# Patient Record
Sex: FEMALE | Race: WHITE | NOT HISPANIC OR LATINO | Employment: FULL TIME | URBAN - METROPOLITAN AREA
[De-identification: names, ages, dates, MRNs, and addresses within clinical notes are randomized per-mention and may not be internally consistent; named-entity substitution may affect disease eponyms.]

---

## 2017-01-06 ENCOUNTER — PROCEDURE VISIT (OUTPATIENT)
Dept: OBSTETRICS AND GYNECOLOGY | Facility: CLINIC | Age: 47
End: 2017-01-06

## 2017-01-06 ENCOUNTER — OFFICE VISIT (OUTPATIENT)
Dept: OBSTETRICS AND GYNECOLOGY | Facility: CLINIC | Age: 47
End: 2017-01-06

## 2017-01-06 VITALS
DIASTOLIC BLOOD PRESSURE: 74 MMHG | SYSTOLIC BLOOD PRESSURE: 102 MMHG | HEIGHT: 63 IN | BODY MASS INDEX: 25.66 KG/M2 | WEIGHT: 144.8 LBS

## 2017-01-06 DIAGNOSIS — R14.0 ABDOMINAL BLOATING ASSOCIATED WITH MENSTRUATION: ICD-10-CM

## 2017-01-06 DIAGNOSIS — Z12.31 VISIT FOR SCREENING MAMMOGRAM: ICD-10-CM

## 2017-01-06 DIAGNOSIS — N94.89 ABDOMINAL BLOATING ASSOCIATED WITH MENSTRUATION: ICD-10-CM

## 2017-01-06 DIAGNOSIS — Z00.00 ANNUAL PHYSICAL EXAM: Primary | ICD-10-CM

## 2017-01-06 PROCEDURE — G0202 SCR MAMMO BI INCL CAD: HCPCS | Performed by: OBSTETRICS & GYNECOLOGY

## 2017-01-06 PROCEDURE — 99396 PREV VISIT EST AGE 40-64: CPT | Performed by: OBSTETRICS & GYNECOLOGY

## 2017-01-06 RX ORDER — ACYCLOVIR 400 MG/1
400 TABLET ORAL TAKE AS DIRECTED
Refills: 5 | COMMUNITY
Start: 2016-11-15 | End: 2020-08-03

## 2017-01-06 RX ORDER — CLINDAMYCIN PHOSPHATE AND BENZOYL PEROXIDE 10; 50 MG/G; MG/G
1 GEL TOPICAL AS NEEDED
Refills: 5 | COMMUNITY
Start: 2016-11-14

## 2017-01-06 RX ORDER — ESCITALOPRAM OXALATE 10 MG/1
10 TABLET ORAL DAILY
Qty: 30 TABLET | Refills: 11 | Status: SHIPPED | OUTPATIENT
Start: 2017-01-06 | End: 2021-07-20

## 2017-01-10 LAB
CONV .: NORMAL
CYTOLOGIST CVX/VAG CYTO: NORMAL
CYTOLOGY CVX/VAG DOC THIN PREP: NORMAL
DX ICD CODE: NORMAL
HIV 1 & 2 AB SER-IMP: NORMAL
Lab: NORMAL
OTHER STN SPEC: NORMAL
PATH REPORT.FINAL DX SPEC: NORMAL
STAT OF ADQ CVX/VAG CYTO-IMP: NORMAL

## 2017-03-08 ENCOUNTER — OFFICE VISIT (OUTPATIENT)
Dept: GASTROENTEROLOGY | Facility: CLINIC | Age: 47
End: 2017-03-08

## 2017-03-08 VITALS
BODY MASS INDEX: 25.55 KG/M2 | SYSTOLIC BLOOD PRESSURE: 118 MMHG | WEIGHT: 144.2 LBS | HEIGHT: 63 IN | DIASTOLIC BLOOD PRESSURE: 72 MMHG

## 2017-03-08 DIAGNOSIS — K59.00 CONSTIPATION, UNSPECIFIED CONSTIPATION TYPE: Primary | ICD-10-CM

## 2017-03-08 PROCEDURE — 99204 OFFICE O/P NEW MOD 45 MIN: CPT | Performed by: INTERNAL MEDICINE

## 2017-03-08 RX ORDER — SODIUM CHLORIDE 0.9 % (FLUSH) 0.9 %
1-10 SYRINGE (ML) INJECTION AS NEEDED
Status: CANCELLED | OUTPATIENT
Start: 2017-03-08

## 2017-03-09 LAB
ALBUMIN SERPL-MCNC: 4.5 G/DL (ref 3.5–5.2)
ALBUMIN/GLOB SERPL: 1.6 G/DL
ALP SERPL-CCNC: 64 U/L (ref 39–117)
ALT SERPL-CCNC: 10 U/L (ref 1–33)
AST SERPL-CCNC: 18 U/L (ref 1–32)
BASOPHILS # BLD AUTO: 0.03 10*3/MM3 (ref 0–0.2)
BASOPHILS NFR BLD AUTO: 0.4 % (ref 0–1.5)
BILIRUB SERPL-MCNC: 0.3 MG/DL (ref 0.1–1.2)
BUN SERPL-MCNC: 18 MG/DL (ref 6–20)
BUN/CREAT SERPL: 18 (ref 7–25)
CALCIUM SERPL-MCNC: 9.9 MG/DL (ref 8.6–10.5)
CHLORIDE SERPL-SCNC: 101 MMOL/L (ref 98–107)
CO2 SERPL-SCNC: 24.4 MMOL/L (ref 22–29)
CREAT SERPL-MCNC: 1 MG/DL (ref 0.57–1)
EOSINOPHIL # BLD AUTO: 0.11 10*3/MM3 (ref 0–0.7)
EOSINOPHIL NFR BLD AUTO: 1.3 % (ref 0.3–6.2)
ERYTHROCYTE [DISTWIDTH] IN BLOOD BY AUTOMATED COUNT: 12.3 % (ref 11.7–13)
GLIADIN PEPTIDE IGA SER-ACNC: 5 UNITS (ref 0–19)
GLIADIN PEPTIDE IGG SER-ACNC: 3 UNITS (ref 0–19)
GLOBULIN SER CALC-MCNC: 2.9 GM/DL
GLUCOSE SERPL-MCNC: 98 MG/DL (ref 65–99)
HCT VFR BLD AUTO: 41.2 % (ref 35.6–45.5)
HGB BLD-MCNC: 13.6 G/DL (ref 11.9–15.5)
IGA SERPL-MCNC: 269 MG/DL (ref 87–352)
IMM GRANULOCYTES # BLD: 0.02 10*3/MM3 (ref 0–0.03)
IMM GRANULOCYTES NFR BLD: 0.2 % (ref 0–0.5)
LYMPHOCYTES # BLD AUTO: 2.81 10*3/MM3 (ref 0.9–4.8)
LYMPHOCYTES NFR BLD AUTO: 32.9 % (ref 19.6–45.3)
MCH RBC QN AUTO: 30.3 PG (ref 26.9–32)
MCHC RBC AUTO-ENTMCNC: 33 G/DL (ref 32.4–36.3)
MCV RBC AUTO: 91.8 FL (ref 80.5–98.2)
MONOCYTES # BLD AUTO: 0.55 10*3/MM3 (ref 0.2–1.2)
MONOCYTES NFR BLD AUTO: 6.4 % (ref 5–12)
NEUTROPHILS # BLD AUTO: 5.01 10*3/MM3 (ref 1.9–8.1)
NEUTROPHILS NFR BLD AUTO: 58.8 % (ref 42.7–76)
PLATELET # BLD AUTO: 389 10*3/MM3 (ref 140–500)
POTASSIUM SERPL-SCNC: 4.7 MMOL/L (ref 3.5–5.2)
PROT SERPL-MCNC: 7.4 G/DL (ref 6–8.5)
RBC # BLD AUTO: 4.49 10*6/MM3 (ref 3.9–5.2)
SODIUM SERPL-SCNC: 141 MMOL/L (ref 136–145)
TSH SERPL DL<=0.005 MIU/L-ACNC: 1.63 MIU/ML (ref 0.27–4.2)
TTG IGA SER-ACNC: <2 U/ML (ref 0–3)
TTG IGG SER-ACNC: <2 U/ML (ref 0–5)
WBC # BLD AUTO: 8.53 10*3/MM3 (ref 4.5–10.7)

## 2017-03-15 ENCOUNTER — TELEPHONE (OUTPATIENT)
Dept: GASTROENTEROLOGY | Facility: CLINIC | Age: 47
End: 2017-03-15

## 2017-05-17 ENCOUNTER — IMPORTED ENCOUNTER (OUTPATIENT)
Dept: URBAN - METROPOLITAN AREA CLINIC 9 | Facility: CLINIC | Age: 47
End: 2017-05-17

## 2018-01-17 ENCOUNTER — OFFICE VISIT (OUTPATIENT)
Dept: OBSTETRICS AND GYNECOLOGY | Facility: CLINIC | Age: 48
End: 2018-01-17

## 2018-01-17 VITALS
DIASTOLIC BLOOD PRESSURE: 90 MMHG | WEIGHT: 156.4 LBS | BODY MASS INDEX: 27.71 KG/M2 | HEIGHT: 63 IN | SYSTOLIC BLOOD PRESSURE: 130 MMHG

## 2018-01-17 DIAGNOSIS — R14.0 ABDOMINAL BLOATING: Primary | ICD-10-CM

## 2018-01-17 DIAGNOSIS — Z01.419 ENCOUNTER FOR WELL WOMAN EXAM WITH ROUTINE GYNECOLOGICAL EXAM: ICD-10-CM

## 2018-01-17 PROCEDURE — 99396 PREV VISIT EST AGE 40-64: CPT | Performed by: OBSTETRICS & GYNECOLOGY

## 2018-01-17 RX ORDER — ESCITALOPRAM OXALATE 10 MG/1
10 TABLET ORAL DAILY
Qty: 30 TABLET | Refills: 11 | Status: SHIPPED | OUTPATIENT
Start: 2018-01-17 | End: 2018-02-05 | Stop reason: SDUPTHER

## 2018-01-17 RX ORDER — ESCITALOPRAM OXALATE 10 MG/1
10 TABLET ORAL DAILY
COMMUNITY
End: 2018-01-17 | Stop reason: SDUPTHER

## 2018-01-18 LAB
CONV .: NORMAL
CYTOLOGIST CVX/VAG CYTO: NORMAL
CYTOLOGY CVX/VAG DOC THIN PREP: NORMAL
DX ICD CODE: NORMAL
HIV 1 & 2 AB SER-IMP: NORMAL
OTHER STN SPEC: NORMAL
PATH REPORT.FINAL DX SPEC: NORMAL
STAT OF ADQ CVX/VAG CYTO-IMP: NORMAL

## 2018-02-07 RX ORDER — NORETHINDRONE ACETATE AND ETHINYL ESTRADIOL, ETHINYL ESTRADIOL AND FERROUS FUMARATE 1MG-10(24)
KIT ORAL
Qty: 28 TABLET | Refills: 0 | Status: SHIPPED | OUTPATIENT
Start: 2018-02-07 | End: 2019-06-07

## 2018-02-07 RX ORDER — ESCITALOPRAM OXALATE 10 MG/1
TABLET ORAL
Qty: 30 TABLET | Refills: 0 | Status: SHIPPED | OUTPATIENT
Start: 2018-02-07 | End: 2018-03-28 | Stop reason: SDUPTHER

## 2018-02-08 ENCOUNTER — OFFICE (OUTPATIENT)
Dept: URBAN - METROPOLITAN AREA CLINIC 66 | Facility: CLINIC | Age: 48
End: 2018-02-08

## 2018-02-08 VITALS
DIASTOLIC BLOOD PRESSURE: 87 MMHG | HEART RATE: 86 BPM | HEIGHT: 63 IN | WEIGHT: 156 LBS | TEMPERATURE: 99.3 F | SYSTOLIC BLOOD PRESSURE: 133 MMHG

## 2018-02-08 DIAGNOSIS — R14.0 ABDOMINAL DISTENSION (GASEOUS): ICD-10-CM

## 2018-02-08 DIAGNOSIS — K59.00 CONSTIPATION, UNSPECIFIED: ICD-10-CM

## 2018-02-08 PROCEDURE — 99242 OFF/OP CONSLTJ NEW/EST SF 20: CPT | Performed by: INTERNAL MEDICINE

## 2018-02-08 RX ORDER — LUBIPROSTONE 8 UG/1
8 CAPSULE, GELATIN COATED ORAL
Qty: 30 | Refills: 11 | Status: ACTIVE
Start: 2018-02-08

## 2018-03-28 RX ORDER — ESCITALOPRAM OXALATE 10 MG/1
TABLET ORAL
Qty: 30 TABLET | Refills: 0 | Status: SHIPPED | OUTPATIENT
Start: 2018-03-28 | End: 2019-06-07 | Stop reason: SDUPTHER

## 2018-04-19 ENCOUNTER — OFFICE (OUTPATIENT)
Dept: URBAN - METROPOLITAN AREA CLINIC 66 | Facility: CLINIC | Age: 48
End: 2018-04-19

## 2018-04-19 VITALS
HEIGHT: 63 IN | SYSTOLIC BLOOD PRESSURE: 120 MMHG | HEART RATE: 74 BPM | DIASTOLIC BLOOD PRESSURE: 82 MMHG | WEIGHT: 159 LBS

## 2018-04-19 DIAGNOSIS — K59.01 SLOW TRANSIT CONSTIPATION: ICD-10-CM

## 2018-04-19 DIAGNOSIS — K59.00 CONSTIPATION, UNSPECIFIED: ICD-10-CM

## 2018-04-19 PROCEDURE — 99212 OFFICE O/P EST SF 10 MIN: CPT | Performed by: INTERNAL MEDICINE

## 2018-05-16 ENCOUNTER — OFFICE VISIT (OUTPATIENT)
Dept: OBSTETRICS AND GYNECOLOGY | Facility: CLINIC | Age: 48
End: 2018-05-16

## 2018-05-16 VITALS — BODY MASS INDEX: 27.99 KG/M2 | DIASTOLIC BLOOD PRESSURE: 84 MMHG | SYSTOLIC BLOOD PRESSURE: 120 MMHG | WEIGHT: 158 LBS

## 2018-05-16 DIAGNOSIS — N63.0 BREAST LUMP IN FEMALE: Primary | ICD-10-CM

## 2018-05-16 PROCEDURE — 99213 OFFICE O/P EST LOW 20 MIN: CPT | Performed by: OBSTETRICS & GYNECOLOGY

## 2018-05-17 ENCOUNTER — HOSPITAL ENCOUNTER (OUTPATIENT)
Dept: ULTRASOUND IMAGING | Facility: HOSPITAL | Age: 48
Discharge: HOME | End: 2018-05-17
Attending: OBSTETRICS & GYNECOLOGY | Admitting: OBSTETRICS & GYNECOLOGY

## 2018-05-17 DIAGNOSIS — N63.0 BREAST LUMP IN FEMALE: ICD-10-CM

## 2018-05-17 PROCEDURE — 76642 ULTRASOUND BREAST LIMITED: CPT

## 2018-05-21 ENCOUNTER — DOCUMENTATION (OUTPATIENT)
Dept: OBSTETRICS AND GYNECOLOGY | Facility: CLINIC | Age: 48
End: 2018-05-21

## 2018-07-12 ENCOUNTER — IMPORTED ENCOUNTER (OUTPATIENT)
Dept: URBAN - METROPOLITAN AREA CLINIC 9 | Facility: CLINIC | Age: 48
End: 2018-07-12

## 2018-10-25 ENCOUNTER — OFFICE (OUTPATIENT)
Dept: URBAN - METROPOLITAN AREA CLINIC 66 | Facility: CLINIC | Age: 48
End: 2018-10-25

## 2018-10-25 VITALS
WEIGHT: 162 LBS | DIASTOLIC BLOOD PRESSURE: 70 MMHG | SYSTOLIC BLOOD PRESSURE: 124 MMHG | HEIGHT: 63 IN | HEART RATE: 88 BPM

## 2018-10-25 DIAGNOSIS — K59.01 SLOW TRANSIT CONSTIPATION: ICD-10-CM

## 2018-10-25 DIAGNOSIS — R14.0 ABDOMINAL DISTENSION (GASEOUS): ICD-10-CM

## 2018-10-25 PROCEDURE — 99212 OFFICE O/P EST SF 10 MIN: CPT | Performed by: INTERNAL MEDICINE

## 2018-12-04 ENCOUNTER — OFFICE VISIT (OUTPATIENT)
Dept: OBSTETRICS AND GYNECOLOGY | Facility: CLINIC | Age: 48
End: 2018-12-04

## 2018-12-04 VITALS
SYSTOLIC BLOOD PRESSURE: 118 MMHG | BODY MASS INDEX: 28.63 KG/M2 | WEIGHT: 161.6 LBS | HEIGHT: 63 IN | DIASTOLIC BLOOD PRESSURE: 68 MMHG

## 2018-12-04 DIAGNOSIS — R59.0 LYMPHADENOPATHY, AXILLARY: Primary | ICD-10-CM

## 2018-12-04 PROCEDURE — 99214 OFFICE O/P EST MOD 30 MIN: CPT | Performed by: OBSTETRICS & GYNECOLOGY

## 2018-12-04 RX ORDER — POLYETHYLENE GLYCOL 3350 17 G/17G
17 POWDER, FOR SOLUTION ORAL DAILY
COMMUNITY

## 2018-12-05 ENCOUNTER — TELEPHONE (OUTPATIENT)
Dept: OBSTETRICS AND GYNECOLOGY | Facility: CLINIC | Age: 48
End: 2018-12-05

## 2018-12-05 ENCOUNTER — TELEPHONE (OUTPATIENT)
Dept: SURGERY | Facility: CLINIC | Age: 48
End: 2018-12-05

## 2018-12-05 DIAGNOSIS — R59.0 AXILLARY ADENOPATHY: Primary | ICD-10-CM

## 2018-12-11 ENCOUNTER — TELEPHONE (OUTPATIENT)
Dept: SURGERY | Facility: CLINIC | Age: 48
End: 2018-12-11

## 2018-12-12 ENCOUNTER — HOSPITAL ENCOUNTER (OUTPATIENT)
Dept: ULTRASOUND IMAGING | Facility: HOSPITAL | Age: 48
Discharge: HOME | End: 2018-12-12
Attending: SURGERY | Admitting: SURGERY

## 2018-12-12 ENCOUNTER — HOSPITAL ENCOUNTER (OUTPATIENT)
Dept: MAMMOGRAPHY | Facility: HOSPITAL | Age: 48
Discharge: HOME | End: 2018-12-12
Attending: SURGERY

## 2018-12-12 DIAGNOSIS — R59.0 AXILLARY LYMPHADENOPATHY: ICD-10-CM

## 2018-12-12 PROCEDURE — 76882 US LMTD JT/FCL EVL NVASC XTR: CPT

## 2018-12-12 PROCEDURE — G0279 TOMOSYNTHESIS, MAMMO: HCPCS

## 2018-12-12 PROCEDURE — 77066 DX MAMMO INCL CAD BI: CPT

## 2018-12-13 ENCOUNTER — OFFICE VISIT (OUTPATIENT)
Dept: SURGERY | Facility: CLINIC | Age: 48
End: 2018-12-13

## 2018-12-13 VITALS
HEART RATE: 93 BPM | BODY MASS INDEX: 28.46 KG/M2 | DIASTOLIC BLOOD PRESSURE: 83 MMHG | WEIGHT: 160.6 LBS | HEIGHT: 63 IN | OXYGEN SATURATION: 99 % | SYSTOLIC BLOOD PRESSURE: 135 MMHG

## 2018-12-13 DIAGNOSIS — N64.4 BREAST PAIN, LEFT: ICD-10-CM

## 2018-12-13 DIAGNOSIS — Q83.1 ACCESSORY BREAST TISSUE OF AXILLA: Primary | ICD-10-CM

## 2018-12-13 PROCEDURE — 99204 OFFICE O/P NEW MOD 45 MIN: CPT | Performed by: SURGERY

## 2019-01-16 ENCOUNTER — OFFICE (OUTPATIENT)
Dept: URBAN - METROPOLITAN AREA CLINIC 66 | Facility: CLINIC | Age: 49
End: 2019-01-16

## 2019-01-16 VITALS
WEIGHT: 164 LBS | HEIGHT: 63 IN | SYSTOLIC BLOOD PRESSURE: 110 MMHG | DIASTOLIC BLOOD PRESSURE: 72 MMHG | HEART RATE: 92 BPM

## 2019-01-16 DIAGNOSIS — K59.01 SLOW TRANSIT CONSTIPATION: ICD-10-CM

## 2019-01-16 DIAGNOSIS — K59.00 CONSTIPATION, UNSPECIFIED: ICD-10-CM

## 2019-01-16 PROCEDURE — 99213 OFFICE O/P EST LOW 20 MIN: CPT | Performed by: INTERNAL MEDICINE

## 2019-01-16 RX ORDER — NORETHINDRONE ACETATE AND ETHINYL ESTRADIOL, ETHINYL ESTRADIOL AND FERROUS FUMARATE 1MG-10(24)
KIT ORAL
Qty: 28 TABLET | Refills: 11 | Status: SHIPPED | OUTPATIENT
Start: 2019-01-16 | End: 2019-06-07

## 2019-04-15 ENCOUNTER — IMPORTED ENCOUNTER (OUTPATIENT)
Dept: URBAN - METROPOLITAN AREA CLINIC 9 | Facility: CLINIC | Age: 49
End: 2019-04-15

## 2019-06-07 ENCOUNTER — OFFICE VISIT (OUTPATIENT)
Dept: OBSTETRICS AND GYNECOLOGY | Facility: CLINIC | Age: 49
End: 2019-06-07

## 2019-06-07 VITALS
HEIGHT: 63 IN | WEIGHT: 163 LBS | DIASTOLIC BLOOD PRESSURE: 78 MMHG | BODY MASS INDEX: 28.88 KG/M2 | SYSTOLIC BLOOD PRESSURE: 120 MMHG

## 2019-06-07 DIAGNOSIS — F32.A DEPRESSION, UNSPECIFIED DEPRESSION TYPE: ICD-10-CM

## 2019-06-07 DIAGNOSIS — N92.0 MENORRHAGIA WITH REGULAR CYCLE: ICD-10-CM

## 2019-06-07 DIAGNOSIS — Z00.00 ANNUAL PHYSICAL EXAM: Primary | ICD-10-CM

## 2019-06-07 PROCEDURE — 99213 OFFICE O/P EST LOW 20 MIN: CPT | Performed by: OBSTETRICS & GYNECOLOGY

## 2019-06-07 PROCEDURE — 99396 PREV VISIT EST AGE 40-64: CPT | Performed by: OBSTETRICS & GYNECOLOGY

## 2019-06-07 RX ORDER — ESCITALOPRAM OXALATE 10 MG/1
10 TABLET ORAL DAILY
Qty: 90 TABLET | Refills: 3 | Status: SHIPPED | OUTPATIENT
Start: 2019-06-07 | End: 2020-06-29

## 2019-06-11 LAB
CONV .: NORMAL
CYTOLOGIST CVX/VAG CYTO: NORMAL
CYTOLOGY CVX/VAG DOC CYTO: NORMAL
CYTOLOGY CVX/VAG DOC THIN PREP: NORMAL
DX ICD CODE: NORMAL
HIV 1 & 2 AB SER-IMP: NORMAL
OTHER STN SPEC: NORMAL
STAT OF ADQ CVX/VAG CYTO-IMP: NORMAL

## 2019-10-23 ENCOUNTER — TRANSCRIBE ORDERS (OUTPATIENT)
Dept: ADMINISTRATIVE | Facility: HOSPITAL | Age: 49
End: 2019-10-23

## 2019-10-23 DIAGNOSIS — Z12.31 SCREENING MAMMOGRAM, ENCOUNTER FOR: Primary | ICD-10-CM

## 2019-10-25 ENCOUNTER — RESULTS ENCOUNTER (OUTPATIENT)
Dept: INTERNAL MEDICINE | Age: 49
End: 2019-10-25

## 2019-10-25 DIAGNOSIS — Z00.00 PREVENTATIVE HEALTH CARE: Primary | ICD-10-CM

## 2019-10-25 DIAGNOSIS — Z00.00 PREVENTATIVE HEALTH CARE: ICD-10-CM

## 2019-10-28 LAB
ALBUMIN SERPL-MCNC: 4.4 G/DL (ref 3.5–5.2)
ALBUMIN/GLOB SERPL: 1.8 G/DL
ALP SERPL-CCNC: 77 U/L (ref 39–117)
ALT SERPL-CCNC: 19 U/L (ref 1–33)
APPEARANCE UR: CLEAR
AST SERPL-CCNC: 17 U/L (ref 1–32)
BACTERIA #/AREA URNS HPF: ABNORMAL /HPF
BASOPHILS # BLD AUTO: 0.03 10*3/MM3 (ref 0–0.2)
BASOPHILS NFR BLD AUTO: 0.4 % (ref 0–1.5)
BILIRUB SERPL-MCNC: 0.3 MG/DL (ref 0.2–1.2)
BILIRUB UR QL STRIP: NEGATIVE
BUN SERPL-MCNC: 15 MG/DL (ref 6–20)
BUN/CREAT SERPL: 14.4 (ref 7–25)
CALCIUM SERPL-MCNC: 10 MG/DL (ref 8.6–10.5)
CASTS URNS MICRO: ABNORMAL
CHLORIDE SERPL-SCNC: 101 MMOL/L (ref 98–107)
CHOLEST SERPL-MCNC: 206 MG/DL (ref 0–200)
CHOLEST/HDLC SERPL: 2.9 {RATIO}
CO2 SERPL-SCNC: 26.6 MMOL/L (ref 22–29)
COLOR UR: YELLOW
CREAT SERPL-MCNC: 1.04 MG/DL (ref 0.57–1)
EOSINOPHIL # BLD AUTO: 0.12 10*3/MM3 (ref 0–0.4)
EOSINOPHIL NFR BLD AUTO: 1.5 % (ref 0.3–6.2)
EPI CELLS #/AREA URNS HPF: ABNORMAL /HPF
ERYTHROCYTE [DISTWIDTH] IN BLOOD BY AUTOMATED COUNT: 11.9 % (ref 12.3–15.4)
GLOBULIN SER CALC-MCNC: 2.5 GM/DL
GLUCOSE SERPL-MCNC: 84 MG/DL (ref 65–99)
GLUCOSE UR QL: NEGATIVE
HBA1C MFR BLD: 5.4 % (ref 4.8–5.6)
HCT VFR BLD AUTO: 39.1 % (ref 34–46.6)
HDLC SERPL-MCNC: 71 MG/DL (ref 40–60)
HGB BLD-MCNC: 13 G/DL (ref 12–15.9)
HGB UR QL STRIP: ABNORMAL
IMM GRANULOCYTES # BLD AUTO: 0.03 10*3/MM3 (ref 0–0.05)
IMM GRANULOCYTES NFR BLD AUTO: 0.4 % (ref 0–0.5)
KETONES UR QL STRIP: NEGATIVE
LDLC SERPL CALC-MCNC: 107 MG/DL (ref 0–100)
LEUKOCYTE ESTERASE UR QL STRIP: ABNORMAL
LYMPHOCYTES # BLD AUTO: 2.21 10*3/MM3 (ref 0.7–3.1)
LYMPHOCYTES NFR BLD AUTO: 27.7 % (ref 19.6–45.3)
MCH RBC QN AUTO: 30.3 PG (ref 26.6–33)
MCHC RBC AUTO-ENTMCNC: 33.2 G/DL (ref 31.5–35.7)
MCV RBC AUTO: 91.1 FL (ref 79–97)
MONOCYTES # BLD AUTO: 0.68 10*3/MM3 (ref 0.1–0.9)
MONOCYTES NFR BLD AUTO: 8.5 % (ref 5–12)
NEUTROPHILS # BLD AUTO: 4.92 10*3/MM3 (ref 1.7–7)
NEUTROPHILS NFR BLD AUTO: 61.5 % (ref 42.7–76)
NITRITE UR QL STRIP: NEGATIVE
NRBC BLD AUTO-RTO: 0 /100 WBC (ref 0–0.2)
PH UR STRIP: 5.5 [PH] (ref 5–8)
PLATELET # BLD AUTO: 445 10*3/MM3 (ref 140–450)
POTASSIUM SERPL-SCNC: 5.2 MMOL/L (ref 3.5–5.2)
PROT SERPL-MCNC: 6.9 G/DL (ref 6–8.5)
PROT UR QL STRIP: NEGATIVE
RBC # BLD AUTO: 4.29 10*6/MM3 (ref 3.77–5.28)
RBC #/AREA URNS HPF: ABNORMAL /HPF
SODIUM SERPL-SCNC: 140 MMOL/L (ref 136–145)
SP GR UR: 1.02 (ref 1–1.03)
T4 FREE SERPL-MCNC: 1.18 NG/DL (ref 0.93–1.7)
TRIGL SERPL-MCNC: 141 MG/DL (ref 0–150)
TSH SERPL DL<=0.005 MIU/L-ACNC: 2.22 UIU/ML (ref 0.27–4.2)
UROBILINOGEN UR STRIP-MCNC: ABNORMAL MG/DL
VLDLC SERPL CALC-MCNC: 28.2 MG/DL
WBC # BLD AUTO: 7.99 10*3/MM3 (ref 3.4–10.8)
WBC #/AREA URNS HPF: ABNORMAL /HPF

## 2019-11-04 ENCOUNTER — OFFICE VISIT (OUTPATIENT)
Dept: INTERNAL MEDICINE | Age: 49
End: 2019-11-04

## 2019-11-04 VITALS
WEIGHT: 166 LBS | HEART RATE: 98 BPM | BODY MASS INDEX: 29.41 KG/M2 | HEIGHT: 63 IN | DIASTOLIC BLOOD PRESSURE: 80 MMHG | SYSTOLIC BLOOD PRESSURE: 118 MMHG | OXYGEN SATURATION: 100 % | TEMPERATURE: 97.9 F

## 2019-11-04 DIAGNOSIS — Z00.00 ROUTINE ADULT HEALTH MAINTENANCE: Primary | ICD-10-CM

## 2019-11-04 PROCEDURE — 99396 PREV VISIT EST AGE 40-64: CPT | Performed by: NURSE PRACTITIONER

## 2019-12-13 ENCOUNTER — HOSPITAL ENCOUNTER (OUTPATIENT)
Dept: MAMMOGRAPHY | Facility: HOSPITAL | Age: 49
Discharge: HOME | End: 2019-12-13
Admitting: OBSTETRICS & GYNECOLOGY

## 2019-12-13 DIAGNOSIS — Z12.31 SCREENING MAMMOGRAM, ENCOUNTER FOR: ICD-10-CM

## 2019-12-13 PROCEDURE — 77067 SCR MAMMO BI INCL CAD: CPT

## 2019-12-13 PROCEDURE — 77063 BREAST TOMOSYNTHESIS BI: CPT

## 2019-12-20 ENCOUNTER — ANESTHESIA EVENT (OUTPATIENT)
Dept: GASTROENTEROLOGY | Facility: HOSPITAL | Age: 49
End: 2019-12-20

## 2019-12-20 ENCOUNTER — ON CAMPUS - OUTPATIENT (OUTPATIENT)
Dept: URBAN - METROPOLITAN AREA HOSPITAL 114 | Facility: HOSPITAL | Age: 49
End: 2019-12-20
Payer: COMMERCIAL

## 2019-12-20 ENCOUNTER — ANESTHESIA (OUTPATIENT)
Dept: GASTROENTEROLOGY | Facility: HOSPITAL | Age: 49
End: 2019-12-20

## 2019-12-20 ENCOUNTER — HOSPITAL ENCOUNTER (OUTPATIENT)
Facility: HOSPITAL | Age: 49
Setting detail: HOSPITAL OUTPATIENT SURGERY
Discharge: HOME | End: 2019-12-20
Attending: INTERNAL MEDICINE | Admitting: INTERNAL MEDICINE

## 2019-12-20 VITALS
RESPIRATION RATE: 15 BRPM | SYSTOLIC BLOOD PRESSURE: 97 MMHG | BODY MASS INDEX: 28.7 KG/M2 | OXYGEN SATURATION: 100 % | WEIGHT: 162 LBS | TEMPERATURE: 97.8 F | HEART RATE: 88 BPM | DIASTOLIC BLOOD PRESSURE: 58 MMHG | HEIGHT: 63 IN

## 2019-12-20 DIAGNOSIS — Z12.11 ENCOUNTER FOR SCREENING FOR MALIGNANT NEOPLASM OF COLON: ICD-10-CM

## 2019-12-20 DIAGNOSIS — K64.1 SECOND DEGREE HEMORRHOIDS: ICD-10-CM

## 2019-12-20 PROCEDURE — 25010000002 PROPOFOL 10 MG/ML EMULSION: Performed by: ANESTHESIOLOGY

## 2019-12-20 PROCEDURE — 45378 DIAGNOSTIC COLONOSCOPY: CPT | Mod: 33 | Performed by: INTERNAL MEDICINE

## 2019-12-20 RX ORDER — LIDOCAINE HYDROCHLORIDE 20 MG/ML
INJECTION, SOLUTION INFILTRATION; PERINEURAL AS NEEDED
Status: DISCONTINUED | OUTPATIENT
Start: 2019-12-20 | End: 2019-12-20 | Stop reason: SURG

## 2019-12-20 RX ORDER — PROPOFOL 10 MG/ML
VIAL (ML) INTRAVENOUS AS NEEDED
Status: DISCONTINUED | OUTPATIENT
Start: 2019-12-20 | End: 2019-12-20 | Stop reason: SURG

## 2019-12-20 RX ORDER — SODIUM CHLORIDE, SODIUM LACTATE, POTASSIUM CHLORIDE, CALCIUM CHLORIDE 600; 310; 30; 20 MG/100ML; MG/100ML; MG/100ML; MG/100ML
30 INJECTION, SOLUTION INTRAVENOUS CONTINUOUS PRN
Status: DISCONTINUED | OUTPATIENT
Start: 2019-12-20 | End: 2019-12-20 | Stop reason: HOSPADM

## 2019-12-20 RX ORDER — PROPOFOL 10 MG/ML
VIAL (ML) INTRAVENOUS CONTINUOUS PRN
Status: DISCONTINUED | OUTPATIENT
Start: 2019-12-20 | End: 2019-12-20 | Stop reason: SURG

## 2019-12-20 RX ORDER — SODIUM CHLORIDE 0.9 % (FLUSH) 0.9 %
10 SYRINGE (ML) INJECTION AS NEEDED
Status: DISCONTINUED | OUTPATIENT
Start: 2019-12-20 | End: 2019-12-20 | Stop reason: HOSPADM

## 2019-12-20 RX ORDER — SODIUM CHLORIDE 0.9 % (FLUSH) 0.9 %
3 SYRINGE (ML) INJECTION EVERY 12 HOURS SCHEDULED
Status: DISCONTINUED | OUTPATIENT
Start: 2019-12-20 | End: 2019-12-20 | Stop reason: HOSPADM

## 2019-12-20 RX ADMIN — PROPOFOL 100 MG: 10 INJECTION, EMULSION INTRAVENOUS at 08:00

## 2019-12-20 RX ADMIN — PROPOFOL 100 MCG/KG/MIN: 10 INJECTION, EMULSION INTRAVENOUS at 08:00

## 2019-12-20 RX ADMIN — SODIUM CHLORIDE, POTASSIUM CHLORIDE, SODIUM LACTATE AND CALCIUM CHLORIDE 30 ML/HR: 600; 310; 30; 20 INJECTION, SOLUTION INTRAVENOUS at 07:33

## 2019-12-20 RX ADMIN — LIDOCAINE HYDROCHLORIDE 60 MG: 20 INJECTION, SOLUTION INFILTRATION; PERINEURAL at 08:00

## 2020-01-08 ENCOUNTER — OFFICE VISIT (OUTPATIENT)
Dept: INTERNAL MEDICINE | Age: 50
End: 2020-01-08

## 2020-01-08 VITALS
HEIGHT: 63 IN | HEART RATE: 101 BPM | OXYGEN SATURATION: 99 % | BODY MASS INDEX: 29.41 KG/M2 | DIASTOLIC BLOOD PRESSURE: 70 MMHG | TEMPERATURE: 97.8 F | SYSTOLIC BLOOD PRESSURE: 120 MMHG | WEIGHT: 166 LBS

## 2020-01-08 DIAGNOSIS — J45.20 MILD INTERMITTENT ASTHMA, UNSPECIFIED WHETHER COMPLICATED: ICD-10-CM

## 2020-01-08 DIAGNOSIS — J06.9 VIRAL URI: Primary | ICD-10-CM

## 2020-01-08 PROCEDURE — 99214 OFFICE O/P EST MOD 30 MIN: CPT | Performed by: INTERNAL MEDICINE

## 2020-01-08 RX ORDER — AZITHROMYCIN 250 MG/1
TABLET, FILM COATED ORAL
Qty: 6 TABLET | Refills: 0 | Status: SHIPPED | OUTPATIENT
Start: 2020-01-08 | End: 2020-01-13

## 2020-01-08 RX ORDER — GUAIFENESIN AND DEXTROMETHORPHAN HYDROBROMIDE 1200; 60 MG/1; MG/1
1 TABLET, EXTENDED RELEASE ORAL 2 TIMES DAILY
COMMUNITY
Start: 2020-01-08 | End: 2020-01-18

## 2020-01-08 RX ORDER — ALBUTEROL SULFATE 90 UG/1
2 AEROSOL, METERED RESPIRATORY (INHALATION) EVERY 4 HOURS PRN
Qty: 1 INHALER | Refills: 0 | Status: SHIPPED | OUTPATIENT
Start: 2020-01-08 | End: 2021-04-30

## 2020-02-25 ENCOUNTER — TELEPHONE (OUTPATIENT)
Dept: INTERNAL MEDICINE | Age: 50
End: 2020-02-25

## 2020-02-26 ENCOUNTER — OFFICE VISIT (OUTPATIENT)
Dept: INTERNAL MEDICINE | Age: 50
End: 2020-02-26

## 2020-02-26 VITALS
OXYGEN SATURATION: 98 % | DIASTOLIC BLOOD PRESSURE: 86 MMHG | TEMPERATURE: 98.8 F | HEART RATE: 119 BPM | BODY MASS INDEX: 29.06 KG/M2 | HEIGHT: 63 IN | WEIGHT: 164 LBS | SYSTOLIC BLOOD PRESSURE: 140 MMHG

## 2020-02-26 DIAGNOSIS — J45.40 MODERATE PERSISTENT ASTHMA, UNSPECIFIED WHETHER COMPLICATED: Chronic | ICD-10-CM

## 2020-02-26 DIAGNOSIS — L50.9 URTICARIA: Primary | ICD-10-CM

## 2020-02-26 DIAGNOSIS — Z88.9 ATOPY: ICD-10-CM

## 2020-02-26 PROCEDURE — 99214 OFFICE O/P EST MOD 30 MIN: CPT | Performed by: INTERNAL MEDICINE

## 2020-02-26 RX ORDER — EPINEPHRINE 0.3 MG/.3ML
0.3 INJECTION SUBCUTANEOUS ONCE
Qty: 1 EACH | Refills: 0 | Status: SHIPPED | OUTPATIENT
Start: 2020-02-26 | End: 2020-02-26

## 2020-06-04 ENCOUNTER — OFFICE VISIT (OUTPATIENT)
Dept: INTERNAL MEDICINE | Age: 50
End: 2020-06-04

## 2020-06-04 VITALS
RESPIRATION RATE: 13 BRPM | SYSTOLIC BLOOD PRESSURE: 118 MMHG | OXYGEN SATURATION: 98 % | HEIGHT: 63 IN | DIASTOLIC BLOOD PRESSURE: 80 MMHG | TEMPERATURE: 98 F | BODY MASS INDEX: 29.38 KG/M2 | HEART RATE: 100 BPM | WEIGHT: 165.8 LBS

## 2020-06-04 DIAGNOSIS — M77.11 LATERAL EPICONDYLITIS OF RIGHT ELBOW: ICD-10-CM

## 2020-06-04 DIAGNOSIS — G56.01 CARPAL TUNNEL SYNDROME OF RIGHT WRIST: Primary | ICD-10-CM

## 2020-06-04 PROCEDURE — 99214 OFFICE O/P EST MOD 30 MIN: CPT | Performed by: INTERNAL MEDICINE

## 2020-06-04 RX ORDER — AZELASTINE 1 MG/ML
2 SPRAY, METERED NASAL 2 TIMES DAILY
COMMUNITY
End: 2022-08-18 | Stop reason: SDUPTHER

## 2020-06-04 RX ORDER — BUDESONIDE AND FORMOTEROL FUMARATE DIHYDRATE 160; 4.5 UG/1; UG/1
2 AEROSOL RESPIRATORY (INHALATION) 2 TIMES DAILY PRN
COMMUNITY

## 2020-06-29 RX ORDER — ESCITALOPRAM OXALATE 10 MG/1
TABLET ORAL
Qty: 90 TABLET | Refills: 3 | Status: SHIPPED | OUTPATIENT
Start: 2020-06-29 | End: 2021-07-20 | Stop reason: SDUPTHER

## 2020-07-20 RX ORDER — NORETHINDRONE ACETATE AND ETHINYL ESTRADIOL, ETHINYL ESTRADIOL AND FERROUS FUMARATE 1MG-10(24)
KIT ORAL
Qty: 84 TABLET | Refills: 3 | Status: SHIPPED | OUTPATIENT
Start: 2020-07-20 | End: 2020-07-21

## 2020-07-21 ENCOUNTER — OFFICE VISIT (OUTPATIENT)
Dept: OBSTETRICS AND GYNECOLOGY | Facility: CLINIC | Age: 50
End: 2020-07-21

## 2020-07-21 VITALS
SYSTOLIC BLOOD PRESSURE: 122 MMHG | BODY MASS INDEX: 29.41 KG/M2 | WEIGHT: 166 LBS | HEIGHT: 63 IN | DIASTOLIC BLOOD PRESSURE: 78 MMHG

## 2020-07-21 DIAGNOSIS — Z00.00 ANNUAL PHYSICAL EXAM: Primary | ICD-10-CM

## 2020-07-21 DIAGNOSIS — Z80.41 FH: OVARIAN CANCER: ICD-10-CM

## 2020-07-21 PROCEDURE — 99396 PREV VISIT EST AGE 40-64: CPT | Performed by: OBSTETRICS & GYNECOLOGY

## 2020-07-23 ENCOUNTER — TELEPHONE (OUTPATIENT)
Dept: OBSTETRICS AND GYNECOLOGY | Facility: CLINIC | Age: 50
End: 2020-07-23

## 2020-07-27 LAB — REQUEST PROBLEM: NORMAL

## 2020-08-03 RX ORDER — ACYCLOVIR 400 MG/1
TABLET ORAL
Qty: 30 TABLET | Refills: 2 | Status: SHIPPED | OUTPATIENT
Start: 2020-08-03

## 2020-09-16 ENCOUNTER — TELEPHONE (OUTPATIENT)
Dept: OBSTETRICS AND GYNECOLOGY | Facility: CLINIC | Age: 50
End: 2020-09-16

## 2020-09-28 ENCOUNTER — TELEPHONE (OUTPATIENT)
Dept: OBSTETRICS AND GYNECOLOGY | Facility: CLINIC | Age: 50
End: 2020-09-28

## 2020-09-29 DIAGNOSIS — Z80.3 FH: BREAST CANCER: Primary | ICD-10-CM

## 2020-10-02 LAB
BRCA1+BRCA2 MUT ANL BLD/T: NORMAL
PREAUTHORIZATION: NORMAL
SPECIMEN STATUS: NORMAL

## 2020-10-09 ENCOUNTER — LAB (OUTPATIENT)
Dept: LAB | Facility: HOSPITAL | Age: 50
End: 2020-10-09

## 2020-10-09 ENCOUNTER — CONSULT (OUTPATIENT)
Dept: ONCOLOGY | Facility: CLINIC | Age: 50
End: 2020-10-09

## 2020-10-09 VITALS
HEIGHT: 62 IN | HEART RATE: 93 BPM | BODY MASS INDEX: 31.43 KG/M2 | SYSTOLIC BLOOD PRESSURE: 125 MMHG | WEIGHT: 170.8 LBS | DIASTOLIC BLOOD PRESSURE: 84 MMHG | TEMPERATURE: 97.5 F | OXYGEN SATURATION: 99 % | RESPIRATION RATE: 18 BRPM

## 2020-10-09 DIAGNOSIS — Z91.89 AT HIGH RISK FOR BREAST CANCER: Primary | ICD-10-CM

## 2020-10-09 DIAGNOSIS — C50.919 MALIGNANT NEOPLASM OF FEMALE BREAST, UNSPECIFIED ESTROGEN RECEPTOR STATUS, UNSPECIFIED LATERALITY, UNSPECIFIED SITE OF BREAST (HCC): Primary | ICD-10-CM

## 2020-10-09 LAB
BASOPHILS # BLD AUTO: 0.06 10*3/MM3 (ref 0–0.2)
BASOPHILS NFR BLD AUTO: 0.6 % (ref 0–1.5)
DEPRECATED RDW RBC AUTO: 39.8 FL (ref 37–54)
EOSINOPHIL # BLD AUTO: 0.24 10*3/MM3 (ref 0–0.4)
EOSINOPHIL NFR BLD AUTO: 2.5 % (ref 0.3–6.2)
ERYTHROCYTE [DISTWIDTH] IN BLOOD BY AUTOMATED COUNT: 11.9 % (ref 12.3–15.4)
HCT VFR BLD AUTO: 43.1 % (ref 34–46.6)
HGB BLD-MCNC: 14.2 G/DL (ref 12–15.9)
IMM GRANULOCYTES # BLD AUTO: 0.06 10*3/MM3 (ref 0–0.05)
IMM GRANULOCYTES NFR BLD AUTO: 0.6 % (ref 0–0.5)
LYMPHOCYTES # BLD AUTO: 2.79 10*3/MM3 (ref 0.7–3.1)
LYMPHOCYTES NFR BLD AUTO: 29.2 % (ref 19.6–45.3)
MCH RBC QN AUTO: 30 PG (ref 26.6–33)
MCHC RBC AUTO-ENTMCNC: 32.9 G/DL (ref 31.5–35.7)
MCV RBC AUTO: 91.1 FL (ref 79–97)
MONOCYTES # BLD AUTO: 0.86 10*3/MM3 (ref 0.1–0.9)
MONOCYTES NFR BLD AUTO: 9 % (ref 5–12)
NEUTROPHILS NFR BLD AUTO: 5.56 10*3/MM3 (ref 1.7–7)
NEUTROPHILS NFR BLD AUTO: 58.1 % (ref 42.7–76)
NRBC BLD AUTO-RTO: 0.2 /100 WBC (ref 0–0.2)
PLATELET # BLD AUTO: 303 10*3/MM3 (ref 140–450)
PMV BLD AUTO: 9.9 FL (ref 6–12)
RBC # BLD AUTO: 4.73 10*6/MM3 (ref 3.77–5.28)
WBC # BLD AUTO: 9.57 10*3/MM3 (ref 3.4–10.8)

## 2020-10-09 PROCEDURE — 85025 COMPLETE CBC W/AUTO DIFF WBC: CPT

## 2020-10-09 PROCEDURE — 99204 OFFICE O/P NEW MOD 45 MIN: CPT | Performed by: INTERNAL MEDICINE

## 2020-10-09 PROCEDURE — 36415 COLL VENOUS BLD VENIPUNCTURE: CPT

## 2020-11-09 ENCOUNTER — TELEPHONE (OUTPATIENT)
Dept: ONCOLOGY | Facility: CLINIC | Age: 50
End: 2020-11-09

## 2020-11-11 ENCOUNTER — TELEPHONE (OUTPATIENT)
Dept: ONCOLOGY | Facility: HOSPITAL | Age: 50
End: 2020-11-11

## 2020-11-11 ENCOUNTER — TELEPHONE (OUTPATIENT)
Dept: GENERAL RADIOLOGY | Facility: HOSPITAL | Age: 50
End: 2020-11-11

## 2020-12-07 ENCOUNTER — LAB (OUTPATIENT)
Dept: LAB | Facility: HOSPITAL | Age: 50
End: 2020-12-07

## 2020-12-07 ENCOUNTER — TRANSCRIBE ORDERS (OUTPATIENT)
Dept: ADMINISTRATIVE | Facility: HOSPITAL | Age: 50
End: 2020-12-07

## 2020-12-07 ENCOUNTER — OFFICE VISIT (OUTPATIENT)
Dept: ONCOLOGY | Facility: CLINIC | Age: 50
End: 2020-12-07

## 2020-12-07 VITALS
RESPIRATION RATE: 16 BRPM | DIASTOLIC BLOOD PRESSURE: 84 MMHG | TEMPERATURE: 98.1 F | HEIGHT: 62 IN | HEART RATE: 96 BPM | BODY MASS INDEX: 32.07 KG/M2 | OXYGEN SATURATION: 98 % | WEIGHT: 174.3 LBS | SYSTOLIC BLOOD PRESSURE: 128 MMHG

## 2020-12-07 DIAGNOSIS — Z91.89 AT HIGH RISK FOR BREAST CANCER: ICD-10-CM

## 2020-12-07 DIAGNOSIS — Z12.31 VISIT FOR SCREENING MAMMOGRAM: Primary | ICD-10-CM

## 2020-12-07 DIAGNOSIS — Z91.89 AT HIGH RISK FOR BREAST CANCER: Primary | ICD-10-CM

## 2020-12-07 DIAGNOSIS — C50.919 MALIGNANT NEOPLASM OF FEMALE BREAST, UNSPECIFIED ESTROGEN RECEPTOR STATUS, UNSPECIFIED LATERALITY, UNSPECIFIED SITE OF BREAST (HCC): Primary | ICD-10-CM

## 2020-12-07 LAB
BASOPHILS # BLD AUTO: 0.03 10*3/MM3 (ref 0–0.2)
BASOPHILS NFR BLD AUTO: 0.3 % (ref 0–1.5)
DEPRECATED RDW RBC AUTO: 39.8 FL (ref 37–54)
EOSINOPHIL # BLD AUTO: 0.32 10*3/MM3 (ref 0–0.4)
EOSINOPHIL NFR BLD AUTO: 3.4 % (ref 0.3–6.2)
ERYTHROCYTE [DISTWIDTH] IN BLOOD BY AUTOMATED COUNT: 12.2 % (ref 12.3–15.4)
HCT VFR BLD AUTO: 41.2 % (ref 34–46.6)
HGB BLD-MCNC: 14.1 G/DL (ref 12–15.9)
IMM GRANULOCYTES # BLD AUTO: 0.05 10*3/MM3 (ref 0–0.05)
IMM GRANULOCYTES NFR BLD AUTO: 0.5 % (ref 0–0.5)
LYMPHOCYTES # BLD AUTO: 1.88 10*3/MM3 (ref 0.7–3.1)
LYMPHOCYTES NFR BLD AUTO: 20 % (ref 19.6–45.3)
MCH RBC QN AUTO: 30.9 PG (ref 26.6–33)
MCHC RBC AUTO-ENTMCNC: 34.2 G/DL (ref 31.5–35.7)
MCV RBC AUTO: 90.2 FL (ref 79–97)
MONOCYTES # BLD AUTO: 1 10*3/MM3 (ref 0.1–0.9)
MONOCYTES NFR BLD AUTO: 10.6 % (ref 5–12)
NEUTROPHILS NFR BLD AUTO: 6.11 10*3/MM3 (ref 1.7–7)
NEUTROPHILS NFR BLD AUTO: 65.2 % (ref 42.7–76)
NRBC BLD AUTO-RTO: 0 /100 WBC (ref 0–0.2)
PLATELET # BLD AUTO: 316 10*3/MM3 (ref 140–450)
PMV BLD AUTO: 9.3 FL (ref 6–12)
RBC # BLD AUTO: 4.57 10*6/MM3 (ref 3.77–5.28)
WBC # BLD AUTO: 9.39 10*3/MM3 (ref 3.4–10.8)

## 2020-12-07 PROCEDURE — 99214 OFFICE O/P EST MOD 30 MIN: CPT | Performed by: INTERNAL MEDICINE

## 2020-12-07 PROCEDURE — 36415 COLL VENOUS BLD VENIPUNCTURE: CPT

## 2020-12-07 PROCEDURE — 85025 COMPLETE CBC W/AUTO DIFF WBC: CPT

## 2021-01-07 ENCOUNTER — TELEPHONE (OUTPATIENT)
Dept: INTERNAL MEDICINE | Age: 51
End: 2021-01-07

## 2021-01-07 ENCOUNTER — APPOINTMENT (OUTPATIENT)
Dept: GENERAL RADIOLOGY | Facility: HOSPITAL | Age: 51
End: 2021-01-07

## 2021-01-07 PROCEDURE — 72050 X-RAY EXAM NECK SPINE 4/5VWS: CPT | Performed by: NURSE PRACTITIONER

## 2021-01-08 LAB — REF LAB TEST RESULTS: NORMAL

## 2021-01-14 ENCOUNTER — OFFICE VISIT (OUTPATIENT)
Dept: INTERNAL MEDICINE | Age: 51
End: 2021-01-14

## 2021-01-14 VITALS
TEMPERATURE: 97.1 F | BODY MASS INDEX: 31.65 KG/M2 | HEIGHT: 62 IN | HEART RATE: 107 BPM | OXYGEN SATURATION: 99 % | DIASTOLIC BLOOD PRESSURE: 80 MMHG | WEIGHT: 172 LBS | SYSTOLIC BLOOD PRESSURE: 122 MMHG

## 2021-01-14 DIAGNOSIS — M54.12 CERVICAL RADICULOPATHY: Primary | ICD-10-CM

## 2021-01-14 PROCEDURE — 99214 OFFICE O/P EST MOD 30 MIN: CPT | Performed by: NURSE PRACTITIONER

## 2021-01-18 ENCOUNTER — TELEMEDICINE (OUTPATIENT)
Dept: ONCOLOGY | Facility: CLINIC | Age: 51
End: 2021-01-18

## 2021-01-18 DIAGNOSIS — Z91.89 AT HIGH RISK FOR BREAST CANCER: Primary | ICD-10-CM

## 2021-01-18 PROCEDURE — 99214 OFFICE O/P EST MOD 30 MIN: CPT | Performed by: INTERNAL MEDICINE

## 2021-01-19 DIAGNOSIS — Z91.89 AT HIGH RISK FOR BREAST CANCER: Primary | ICD-10-CM

## 2021-01-20 ENCOUNTER — HOSPITAL ENCOUNTER (OUTPATIENT)
Dept: PHYSICAL THERAPY | Facility: HOSPITAL | Age: 51
Setting detail: THERAPIES SERIES
Discharge: HOME | End: 2021-01-20

## 2021-01-20 DIAGNOSIS — R29.898 WEAKNESS OF RIGHT UPPER EXTREMITY: ICD-10-CM

## 2021-01-20 DIAGNOSIS — M54.12 CERVICAL RADICULOPATHY: Primary | ICD-10-CM

## 2021-01-20 DIAGNOSIS — M25.60 DECREASED RANGE OF MOTION: ICD-10-CM

## 2021-01-20 PROCEDURE — 97110 THERAPEUTIC EXERCISES: CPT

## 2021-01-20 PROCEDURE — 97161 PT EVAL LOW COMPLEX 20 MIN: CPT

## 2021-01-20 PROCEDURE — 97140 MANUAL THERAPY 1/> REGIONS: CPT

## 2021-01-26 ENCOUNTER — APPOINTMENT (OUTPATIENT)
Dept: CT IMAGING | Facility: HOSPITAL | Age: 51
End: 2021-01-26

## 2021-01-26 ENCOUNTER — HOSPITAL ENCOUNTER (EMERGENCY)
Facility: HOSPITAL | Age: 51
Discharge: HOME | End: 2021-01-26
Attending: EMERGENCY MEDICINE | Admitting: EMERGENCY MEDICINE

## 2021-01-26 ENCOUNTER — APPOINTMENT (OUTPATIENT)
Dept: PHYSICAL THERAPY | Facility: HOSPITAL | Age: 51
End: 2021-01-26

## 2021-01-26 VITALS
WEIGHT: 172 LBS | RESPIRATION RATE: 20 BRPM | BODY MASS INDEX: 29.37 KG/M2 | DIASTOLIC BLOOD PRESSURE: 83 MMHG | OXYGEN SATURATION: 99 % | TEMPERATURE: 98.5 F | HEIGHT: 64 IN | SYSTOLIC BLOOD PRESSURE: 129 MMHG | HEART RATE: 108 BPM

## 2021-01-26 DIAGNOSIS — M54.12 CERVICAL RADICULOPATHY AT C6: ICD-10-CM

## 2021-01-26 DIAGNOSIS — M54.12 CERVICAL RADICULOPATHY AT C5: Primary | ICD-10-CM

## 2021-01-26 PROCEDURE — 96372 THER/PROPH/DIAG INJ SC/IM: CPT

## 2021-01-26 PROCEDURE — 99283 EMERGENCY DEPT VISIT LOW MDM: CPT

## 2021-01-26 PROCEDURE — 25010000002 KETOROLAC TROMETHAMINE PER 15 MG: Performed by: NURSE PRACTITIONER

## 2021-01-26 PROCEDURE — 63710000001 ONDANSETRON ODT 4 MG TABLET DISPERSIBLE: Performed by: NURSE PRACTITIONER

## 2021-01-26 PROCEDURE — 63710000001 PREDNISONE PER 1 MG: Performed by: NURSE PRACTITIONER

## 2021-01-26 PROCEDURE — 72125 CT NECK SPINE W/O DYE: CPT

## 2021-01-26 RX ORDER — PREDNISONE 20 MG/1
40 TABLET ORAL DAILY
Qty: 10 TABLET | Refills: 0 | Status: SHIPPED | OUTPATIENT
Start: 2021-01-26 | End: 2021-01-31

## 2021-01-26 RX ORDER — KETOROLAC TROMETHAMINE 30 MG/ML
30 INJECTION, SOLUTION INTRAMUSCULAR; INTRAVENOUS EVERY 6 HOURS PRN
Status: DISCONTINUED | OUTPATIENT
Start: 2021-01-26 | End: 2021-01-26 | Stop reason: HOSPADM

## 2021-01-26 RX ORDER — DICLOFENAC SODIUM 75 MG/1
75 TABLET, DELAYED RELEASE ORAL 2 TIMES DAILY
Qty: 20 TABLET | Refills: 0 | Status: SHIPPED | OUTPATIENT
Start: 2021-01-26 | End: 2021-02-01 | Stop reason: SDUPTHER

## 2021-01-26 RX ORDER — HYDROCODONE BITARTRATE AND ACETAMINOPHEN 7.5; 325 MG/1; MG/1
1 TABLET ORAL ONCE
Status: COMPLETED | OUTPATIENT
Start: 2021-01-26 | End: 2021-01-26

## 2021-01-26 RX ORDER — ONDANSETRON 4 MG/1
4 TABLET, ORALLY DISINTEGRATING ORAL ONCE
Status: COMPLETED | OUTPATIENT
Start: 2021-01-26 | End: 2021-01-26

## 2021-01-26 RX ORDER — PREDNISONE 20 MG/1
60 TABLET ORAL ONCE
Status: COMPLETED | OUTPATIENT
Start: 2021-01-26 | End: 2021-01-26

## 2021-01-26 RX ADMIN — KETOROLAC TROMETHAMINE 30 MG: 30 INJECTION, SOLUTION INTRAMUSCULAR at 15:41

## 2021-01-26 RX ADMIN — ONDANSETRON 4 MG: 4 TABLET, ORALLY DISINTEGRATING ORAL at 17:13

## 2021-01-26 RX ADMIN — PREDNISONE 60 MG: 20 TABLET ORAL at 15:41

## 2021-01-26 RX ADMIN — HYDROCODONE BITARTRATE AND ACETAMINOPHEN 1 TABLET: 7.5; 325 TABLET ORAL at 17:13

## 2021-01-28 ENCOUNTER — HOSPITAL ENCOUNTER (OUTPATIENT)
Dept: MRI IMAGING | Facility: HOSPITAL | Age: 51
Discharge: HOME | End: 2021-01-28
Admitting: NURSE PRACTITIONER

## 2021-01-28 ENCOUNTER — HOSPITAL ENCOUNTER (OUTPATIENT)
Dept: PHYSICAL THERAPY | Facility: HOSPITAL | Age: 51
Setting detail: THERAPIES SERIES
Discharge: HOME | End: 2021-01-28

## 2021-01-28 DIAGNOSIS — M54.12 CERVICAL RADICULOPATHY: Primary | ICD-10-CM

## 2021-01-28 DIAGNOSIS — M25.60 DECREASED RANGE OF MOTION: ICD-10-CM

## 2021-01-28 DIAGNOSIS — R29.898 WEAKNESS OF RIGHT UPPER EXTREMITY: ICD-10-CM

## 2021-01-28 PROCEDURE — 97012 MECHANICAL TRACTION THERAPY: CPT

## 2021-01-28 PROCEDURE — 72141 MRI NECK SPINE W/O DYE: CPT

## 2021-01-28 PROCEDURE — 97110 THERAPEUTIC EXERCISES: CPT

## 2021-01-28 PROCEDURE — 97140 MANUAL THERAPY 1/> REGIONS: CPT

## 2021-01-29 ENCOUNTER — TELEPHONE (OUTPATIENT)
Dept: INTERNAL MEDICINE | Age: 51
End: 2021-01-29

## 2021-01-29 DIAGNOSIS — M54.12 CERVICAL RADICULOPATHY: Primary | ICD-10-CM

## 2021-02-01 ENCOUNTER — IMPORTED ENCOUNTER (OUTPATIENT)
Dept: URBAN - METROPOLITAN AREA CLINIC 9 | Facility: CLINIC | Age: 51
End: 2021-02-01

## 2021-02-01 ENCOUNTER — TELEPHONE (OUTPATIENT)
Dept: INTERNAL MEDICINE | Age: 51
End: 2021-02-01

## 2021-02-01 DIAGNOSIS — M54.12 CERVICAL RADICULOPATHY: Primary | ICD-10-CM

## 2021-02-01 PROBLEM — H01.024: Noted: 2021-02-01

## 2021-02-01 PROBLEM — H52.03: Noted: 2021-02-01

## 2021-02-01 PROBLEM — H01.021: Noted: 2021-02-01

## 2021-02-01 PROBLEM — H53.19: Noted: 2021-02-01

## 2021-02-01 PROBLEM — H52.4: Noted: 2021-02-01

## 2021-02-01 PROBLEM — H04.123: Noted: 2021-02-01

## 2021-02-01 PROBLEM — H43.813: Noted: 2021-02-01

## 2021-02-01 RX ORDER — DICLOFENAC SODIUM 75 MG/1
75 TABLET, DELAYED RELEASE ORAL 2 TIMES DAILY
Qty: 20 TABLET | Refills: 0 | Status: SHIPPED | OUTPATIENT
Start: 2021-02-01 | End: 2021-02-12

## 2021-02-02 ENCOUNTER — HOSPITAL ENCOUNTER (OUTPATIENT)
Dept: PHYSICAL THERAPY | Facility: HOSPITAL | Age: 51
Setting detail: THERAPIES SERIES
Discharge: HOME | End: 2021-02-02

## 2021-02-02 DIAGNOSIS — R29.898 WEAKNESS OF RIGHT UPPER EXTREMITY: ICD-10-CM

## 2021-02-02 DIAGNOSIS — M25.60 DECREASED RANGE OF MOTION: ICD-10-CM

## 2021-02-02 DIAGNOSIS — M54.12 CERVICAL RADICULOPATHY: Primary | ICD-10-CM

## 2021-02-02 PROCEDURE — 97140 MANUAL THERAPY 1/> REGIONS: CPT

## 2021-02-02 PROCEDURE — 97012 MECHANICAL TRACTION THERAPY: CPT

## 2021-02-02 PROCEDURE — 97110 THERAPEUTIC EXERCISES: CPT

## 2021-02-04 ENCOUNTER — HOSPITAL ENCOUNTER (OUTPATIENT)
Dept: PHYSICAL THERAPY | Facility: HOSPITAL | Age: 51
Setting detail: THERAPIES SERIES
Discharge: HOME | End: 2021-02-04

## 2021-02-04 DIAGNOSIS — M25.60 DECREASED RANGE OF MOTION: ICD-10-CM

## 2021-02-04 DIAGNOSIS — R29.898 WEAKNESS OF RIGHT UPPER EXTREMITY: ICD-10-CM

## 2021-02-04 DIAGNOSIS — M54.12 CERVICAL RADICULOPATHY: Primary | ICD-10-CM

## 2021-02-04 PROCEDURE — 97140 MANUAL THERAPY 1/> REGIONS: CPT

## 2021-02-04 PROCEDURE — 97110 THERAPEUTIC EXERCISES: CPT

## 2021-02-04 PROCEDURE — 97012 MECHANICAL TRACTION THERAPY: CPT

## 2021-02-05 ENCOUNTER — PREP FOR SURGERY (OUTPATIENT)
Dept: OTHER | Facility: HOSPITAL | Age: 51
End: 2021-02-05

## 2021-02-05 ENCOUNTER — OFFICE VISIT (OUTPATIENT)
Dept: OBSTETRICS AND GYNECOLOGY | Facility: CLINIC | Age: 51
End: 2021-02-05

## 2021-02-05 VITALS
WEIGHT: 173 LBS | BODY MASS INDEX: 29.53 KG/M2 | HEIGHT: 64 IN | DIASTOLIC BLOOD PRESSURE: 72 MMHG | SYSTOLIC BLOOD PRESSURE: 122 MMHG

## 2021-02-05 DIAGNOSIS — Z80.41 FH: OVARIAN CANCER: Primary | ICD-10-CM

## 2021-02-05 DIAGNOSIS — Z91.89 HIGH RISK OF OVARIAN CANCER: Primary | ICD-10-CM

## 2021-02-05 DIAGNOSIS — Z91.89 HIGH RISK OF OVARIAN CANCER: ICD-10-CM

## 2021-02-05 PROCEDURE — 99214 OFFICE O/P EST MOD 30 MIN: CPT | Performed by: OBSTETRICS & GYNECOLOGY

## 2021-02-05 RX ORDER — SODIUM CHLORIDE 0.9 % (FLUSH) 0.9 %
3 SYRINGE (ML) INJECTION EVERY 12 HOURS SCHEDULED
Status: CANCELLED | OUTPATIENT
Start: 2021-05-03

## 2021-02-05 RX ORDER — SODIUM CHLORIDE 0.9 % (FLUSH) 0.9 %
10 SYRINGE (ML) INJECTION AS NEEDED
Status: CANCELLED | OUTPATIENT
Start: 2021-05-03

## 2021-02-08 ENCOUNTER — HOSPITAL ENCOUNTER (OUTPATIENT)
Dept: PHYSICAL THERAPY | Facility: HOSPITAL | Age: 51
Setting detail: THERAPIES SERIES
Discharge: HOME | End: 2021-02-08

## 2021-02-08 DIAGNOSIS — R29.898 WEAKNESS OF RIGHT UPPER EXTREMITY: ICD-10-CM

## 2021-02-08 DIAGNOSIS — M25.60 DECREASED RANGE OF MOTION: ICD-10-CM

## 2021-02-08 DIAGNOSIS — M54.12 CERVICAL RADICULOPATHY: Primary | ICD-10-CM

## 2021-02-08 PROCEDURE — 97012 MECHANICAL TRACTION THERAPY: CPT

## 2021-02-08 PROCEDURE — 97110 THERAPEUTIC EXERCISES: CPT

## 2021-02-08 PROCEDURE — 97140 MANUAL THERAPY 1/> REGIONS: CPT

## 2021-02-10 ENCOUNTER — HOSPITAL ENCOUNTER (OUTPATIENT)
Dept: PHYSICAL THERAPY | Facility: HOSPITAL | Age: 51
Setting detail: THERAPIES SERIES
Discharge: HOME | End: 2021-02-10

## 2021-02-10 DIAGNOSIS — R29.898 WEAKNESS OF RIGHT UPPER EXTREMITY: ICD-10-CM

## 2021-02-10 DIAGNOSIS — M25.60 DECREASED RANGE OF MOTION: ICD-10-CM

## 2021-02-10 DIAGNOSIS — M54.12 CERVICAL RADICULOPATHY: Primary | ICD-10-CM

## 2021-02-10 PROCEDURE — 97140 MANUAL THERAPY 1/> REGIONS: CPT

## 2021-02-10 PROCEDURE — 97012 MECHANICAL TRACTION THERAPY: CPT

## 2021-02-10 PROCEDURE — 97110 THERAPEUTIC EXERCISES: CPT

## 2021-02-12 DIAGNOSIS — M54.12 CERVICAL RADICULOPATHY: ICD-10-CM

## 2021-02-12 RX ORDER — DICLOFENAC SODIUM 75 MG/1
TABLET, DELAYED RELEASE ORAL
Qty: 20 TABLET | Refills: 0 | Status: SHIPPED | OUTPATIENT
Start: 2021-02-12 | End: 2021-02-22

## 2021-02-16 ENCOUNTER — APPOINTMENT (OUTPATIENT)
Dept: PHYSICAL THERAPY | Facility: HOSPITAL | Age: 51
End: 2021-02-16

## 2021-02-17 ENCOUNTER — APPOINTMENT (OUTPATIENT)
Dept: PHYSICAL THERAPY | Facility: HOSPITAL | Age: 51
End: 2021-02-17

## 2021-02-21 DIAGNOSIS — M54.12 CERVICAL RADICULOPATHY: ICD-10-CM

## 2021-02-22 ENCOUNTER — OFFICE VISIT (OUTPATIENT)
Dept: INTERNAL MEDICINE | Age: 51
End: 2021-02-22

## 2021-02-22 ENCOUNTER — LAB (OUTPATIENT)
Dept: LAB | Facility: HOSPITAL | Age: 51
End: 2021-02-22

## 2021-02-22 VITALS
DIASTOLIC BLOOD PRESSURE: 80 MMHG | BODY MASS INDEX: 29.53 KG/M2 | OXYGEN SATURATION: 97 % | TEMPERATURE: 97.8 F | WEIGHT: 173 LBS | SYSTOLIC BLOOD PRESSURE: 122 MMHG | HEART RATE: 88 BPM | HEIGHT: 64 IN

## 2021-02-22 DIAGNOSIS — M54.12 CERVICAL RADICULOPATHY: Primary | ICD-10-CM

## 2021-02-22 DIAGNOSIS — Z91.89 HIGH RISK OF OVARIAN CANCER: ICD-10-CM

## 2021-02-22 LAB
ANION GAP SERPL CALCULATED.3IONS-SCNC: 9.1 MMOL/L (ref 5–15)
BUN SERPL-MCNC: 14 MG/DL (ref 6–20)
BUN/CREAT SERPL: 17.9 (ref 7–25)
CALCIUM SPEC-SCNC: 9.9 MG/DL (ref 8.6–10.5)
CHLORIDE SERPL-SCNC: 102 MMOL/L (ref 98–107)
CO2 SERPL-SCNC: 26.9 MMOL/L (ref 22–29)
CREAT SERPL-MCNC: 0.78 MG/DL (ref 0.57–1)
GFR SERPL CREATININE-BSD FRML MDRD: 78 ML/MIN/1.73
GLUCOSE SERPL-MCNC: 95 MG/DL (ref 65–99)
POTASSIUM SERPL-SCNC: 4.8 MMOL/L (ref 3.5–5.2)
SODIUM SERPL-SCNC: 138 MMOL/L (ref 136–145)

## 2021-02-22 PROCEDURE — 36415 COLL VENOUS BLD VENIPUNCTURE: CPT | Performed by: NURSE PRACTITIONER

## 2021-02-22 PROCEDURE — 80048 BASIC METABOLIC PNL TOTAL CA: CPT | Performed by: NURSE PRACTITIONER

## 2021-02-22 PROCEDURE — 99214 OFFICE O/P EST MOD 30 MIN: CPT | Performed by: NURSE PRACTITIONER

## 2021-02-22 RX ORDER — DICLOFENAC SODIUM 75 MG/1
TABLET, DELAYED RELEASE ORAL
Qty: 20 TABLET | Refills: 0 | Status: SHIPPED | OUTPATIENT
Start: 2021-02-22 | End: 2021-03-23

## 2021-02-23 ENCOUNTER — OFFICE VISIT (OUTPATIENT)
Dept: NEUROSURGERY | Facility: CLINIC | Age: 51
End: 2021-02-23

## 2021-02-23 VITALS
BODY MASS INDEX: 29.53 KG/M2 | TEMPERATURE: 98.2 F | WEIGHT: 173 LBS | HEIGHT: 64 IN | HEART RATE: 89 BPM | DIASTOLIC BLOOD PRESSURE: 81 MMHG | SYSTOLIC BLOOD PRESSURE: 127 MMHG

## 2021-02-23 DIAGNOSIS — M47.812 CERVICAL SPONDYLOSIS WITHOUT MYELOPATHY: ICD-10-CM

## 2021-02-23 DIAGNOSIS — M54.12 CERVICAL RADICULOPATHY AT C7: Primary | ICD-10-CM

## 2021-02-23 PROCEDURE — 99204 OFFICE O/P NEW MOD 45 MIN: CPT | Performed by: NEUROLOGICAL SURGERY

## 2021-02-24 ENCOUNTER — HOSPITAL ENCOUNTER (OUTPATIENT)
Dept: PHYSICAL THERAPY | Facility: HOSPITAL | Age: 51
Setting detail: THERAPIES SERIES
Discharge: HOME | End: 2021-02-24

## 2021-02-24 DIAGNOSIS — M25.60 DECREASED RANGE OF MOTION: ICD-10-CM

## 2021-02-24 DIAGNOSIS — M54.12 CERVICAL RADICULOPATHY: Primary | ICD-10-CM

## 2021-02-24 DIAGNOSIS — R29.898 WEAKNESS OF RIGHT UPPER EXTREMITY: ICD-10-CM

## 2021-02-24 PROCEDURE — 97012 MECHANICAL TRACTION THERAPY: CPT

## 2021-02-24 PROCEDURE — 97140 MANUAL THERAPY 1/> REGIONS: CPT

## 2021-02-24 PROCEDURE — 97530 THERAPEUTIC ACTIVITIES: CPT

## 2021-03-04 ENCOUNTER — HOSPITAL ENCOUNTER (OUTPATIENT)
Dept: PHYSICAL THERAPY | Facility: HOSPITAL | Age: 51
Setting detail: THERAPIES SERIES
Discharge: HOME | End: 2021-03-04

## 2021-03-04 ENCOUNTER — TELEPHONE (OUTPATIENT)
Dept: INTERNAL MEDICINE | Age: 51
End: 2021-03-04

## 2021-03-04 DIAGNOSIS — R29.898 WEAKNESS OF RIGHT UPPER EXTREMITY: ICD-10-CM

## 2021-03-04 DIAGNOSIS — M25.60 DECREASED RANGE OF MOTION: ICD-10-CM

## 2021-03-04 DIAGNOSIS — M54.12 CERVICAL RADICULOPATHY: Primary | ICD-10-CM

## 2021-03-04 PROCEDURE — 97110 THERAPEUTIC EXERCISES: CPT

## 2021-03-06 DIAGNOSIS — M54.12 CERVICAL RADICULOPATHY: ICD-10-CM

## 2021-03-08 RX ORDER — DICLOFENAC SODIUM 75 MG/1
TABLET, DELAYED RELEASE ORAL
Qty: 20 TABLET | Refills: 0 | OUTPATIENT
Start: 2021-03-08

## 2021-03-23 ENCOUNTER — OFFICE VISIT (OUTPATIENT)
Dept: NEUROSURGERY | Facility: CLINIC | Age: 51
End: 2021-03-23

## 2021-03-23 VITALS
TEMPERATURE: 98.6 F | DIASTOLIC BLOOD PRESSURE: 78 MMHG | BODY MASS INDEX: 29.53 KG/M2 | WEIGHT: 173 LBS | SYSTOLIC BLOOD PRESSURE: 132 MMHG | HEIGHT: 64 IN

## 2021-03-23 DIAGNOSIS — M47.812 CERVICAL SPONDYLOSIS WITHOUT MYELOPATHY: Primary | ICD-10-CM

## 2021-03-23 DIAGNOSIS — M54.12 CERVICAL RADICULOPATHY AT C7: ICD-10-CM

## 2021-03-23 PROCEDURE — 99213 OFFICE O/P EST LOW 20 MIN: CPT | Performed by: NEUROLOGICAL SURGERY

## 2021-03-26 ENCOUNTER — BULK ORDERING (OUTPATIENT)
Dept: CASE MANAGEMENT | Facility: OTHER | Age: 51
End: 2021-03-26

## 2021-03-26 DIAGNOSIS — Z23 IMMUNIZATION DUE: ICD-10-CM

## 2021-04-16 ENCOUNTER — HOSPITAL ENCOUNTER (OUTPATIENT)
Dept: MAMMOGRAPHY | Facility: HOSPITAL | Age: 51
Discharge: HOME | End: 2021-04-16
Admitting: OBSTETRICS & GYNECOLOGY

## 2021-04-16 DIAGNOSIS — Z12.31 VISIT FOR SCREENING MAMMOGRAM: ICD-10-CM

## 2021-04-16 PROCEDURE — 77067 SCR MAMMO BI INCL CAD: CPT

## 2021-04-16 PROCEDURE — 77063 BREAST TOMOSYNTHESIS BI: CPT

## 2021-04-20 ENCOUNTER — LAB (OUTPATIENT)
Dept: LAB | Facility: HOSPITAL | Age: 51
End: 2021-04-20

## 2021-04-20 ENCOUNTER — OFFICE VISIT (OUTPATIENT)
Dept: ONCOLOGY | Facility: CLINIC | Age: 51
End: 2021-04-20

## 2021-04-20 VITALS
HEIGHT: 64 IN | BODY MASS INDEX: 29.71 KG/M2 | DIASTOLIC BLOOD PRESSURE: 68 MMHG | TEMPERATURE: 97.5 F | HEART RATE: 82 BPM | WEIGHT: 174 LBS | OXYGEN SATURATION: 100 % | SYSTOLIC BLOOD PRESSURE: 126 MMHG | RESPIRATION RATE: 16 BRPM

## 2021-04-20 DIAGNOSIS — Z91.89 AT HIGH RISK FOR BREAST CANCER: Primary | ICD-10-CM

## 2021-04-20 DIAGNOSIS — Z91.89 AT HIGH RISK FOR BREAST CANCER: ICD-10-CM

## 2021-04-20 LAB
ALBUMIN SERPL-MCNC: 4.3 G/DL (ref 3.5–5.2)
ALBUMIN/GLOB SERPL: 1.5 G/DL (ref 1.1–2.4)
ALP SERPL-CCNC: 102 U/L (ref 38–116)
ALT SERPL W P-5'-P-CCNC: 18 U/L (ref 0–33)
ANION GAP SERPL CALCULATED.3IONS-SCNC: 8.9 MMOL/L (ref 5–15)
AST SERPL-CCNC: 17 U/L (ref 0–32)
BASOPHILS # BLD AUTO: 0.05 10*3/MM3 (ref 0–0.2)
BASOPHILS NFR BLD AUTO: 0.6 % (ref 0–1.5)
BILIRUB SERPL-MCNC: 0.2 MG/DL (ref 0.2–1.2)
BUN SERPL-MCNC: 17 MG/DL (ref 6–20)
BUN/CREAT SERPL: 18.7 (ref 7.3–30)
CALCIUM SPEC-SCNC: 9.8 MG/DL (ref 8.5–10.2)
CHLORIDE SERPL-SCNC: 103 MMOL/L (ref 98–107)
CO2 SERPL-SCNC: 27.1 MMOL/L (ref 22–29)
CREAT SERPL-MCNC: 0.91 MG/DL (ref 0.6–1.1)
DEPRECATED RDW RBC AUTO: 41.1 FL (ref 37–54)
EOSINOPHIL # BLD AUTO: 0.17 10*3/MM3 (ref 0–0.4)
EOSINOPHIL NFR BLD AUTO: 1.9 % (ref 0.3–6.2)
ERYTHROCYTE [DISTWIDTH] IN BLOOD BY AUTOMATED COUNT: 12.1 % (ref 12.3–15.4)
GFR SERPL CREATININE-BSD FRML MDRD: 65 ML/MIN/1.73
GLOBULIN UR ELPH-MCNC: 2.9 GM/DL (ref 1.8–3.5)
GLUCOSE SERPL-MCNC: 96 MG/DL (ref 74–124)
HCT VFR BLD AUTO: 41.3 % (ref 34–46.6)
HGB BLD-MCNC: 14.2 G/DL (ref 12–15.9)
IMM GRANULOCYTES # BLD AUTO: 0.02 10*3/MM3 (ref 0–0.05)
IMM GRANULOCYTES NFR BLD AUTO: 0.2 % (ref 0–0.5)
LYMPHOCYTES # BLD AUTO: 2.45 10*3/MM3 (ref 0.7–3.1)
LYMPHOCYTES NFR BLD AUTO: 27.8 % (ref 19.6–45.3)
MCH RBC QN AUTO: 31.6 PG (ref 26.6–33)
MCHC RBC AUTO-ENTMCNC: 34.4 G/DL (ref 31.5–35.7)
MCV RBC AUTO: 92 FL (ref 79–97)
MONOCYTES # BLD AUTO: 0.73 10*3/MM3 (ref 0.1–0.9)
MONOCYTES NFR BLD AUTO: 8.3 % (ref 5–12)
NEUTROPHILS NFR BLD AUTO: 5.39 10*3/MM3 (ref 1.7–7)
NEUTROPHILS NFR BLD AUTO: 61.2 % (ref 42.7–76)
NRBC BLD AUTO-RTO: 0 /100 WBC (ref 0–0.2)
PLATELET # BLD AUTO: 415 10*3/MM3 (ref 140–450)
PMV BLD AUTO: 8.6 FL (ref 6–12)
POTASSIUM SERPL-SCNC: 4.4 MMOL/L (ref 3.5–4.7)
PROT SERPL-MCNC: 7.2 G/DL (ref 6.3–8)
RBC # BLD AUTO: 4.49 10*6/MM3 (ref 3.77–5.28)
SODIUM SERPL-SCNC: 139 MMOL/L (ref 134–145)
WBC # BLD AUTO: 8.81 10*3/MM3 (ref 3.4–10.8)

## 2021-04-20 PROCEDURE — 99214 OFFICE O/P EST MOD 30 MIN: CPT | Performed by: INTERNAL MEDICINE

## 2021-04-20 PROCEDURE — 36415 COLL VENOUS BLD VENIPUNCTURE: CPT

## 2021-04-20 PROCEDURE — 80053 COMPREHEN METABOLIC PANEL: CPT

## 2021-04-20 PROCEDURE — 85025 COMPLETE CBC W/AUTO DIFF WBC: CPT

## 2021-04-30 ENCOUNTER — PRE-ADMISSION TESTING (OUTPATIENT)
Dept: PREADMISSION TESTING | Facility: HOSPITAL | Age: 51
End: 2021-04-30

## 2021-04-30 VITALS
BODY MASS INDEX: 31.01 KG/M2 | DIASTOLIC BLOOD PRESSURE: 77 MMHG | TEMPERATURE: 98.9 F | HEART RATE: 90 BPM | SYSTOLIC BLOOD PRESSURE: 134 MMHG | RESPIRATION RATE: 16 BRPM | WEIGHT: 175 LBS | HEIGHT: 63 IN | OXYGEN SATURATION: 100 %

## 2021-04-30 LAB
ANION GAP SERPL CALCULATED.3IONS-SCNC: 8.5 MMOL/L (ref 5–15)
BUN SERPL-MCNC: 13 MG/DL (ref 6–20)
BUN/CREAT SERPL: 14.6 (ref 7–25)
CALCIUM SPEC-SCNC: 9.6 MG/DL (ref 8.6–10.5)
CHLORIDE SERPL-SCNC: 102 MMOL/L (ref 98–107)
CO2 SERPL-SCNC: 26.5 MMOL/L (ref 22–29)
CREAT SERPL-MCNC: 0.89 MG/DL (ref 0.57–1)
DEPRECATED RDW RBC AUTO: 40.3 FL (ref 37–54)
ERYTHROCYTE [DISTWIDTH] IN BLOOD BY AUTOMATED COUNT: 11.8 % (ref 12.3–15.4)
GFR SERPL CREATININE-BSD FRML MDRD: 67 ML/MIN/1.73
GLUCOSE SERPL-MCNC: 88 MG/DL (ref 65–99)
HCG SERPL QL: NEGATIVE
HCT VFR BLD AUTO: 40.8 % (ref 34–46.6)
HGB BLD-MCNC: 13.8 G/DL (ref 12–15.9)
MCH RBC QN AUTO: 31.5 PG (ref 26.6–33)
MCHC RBC AUTO-ENTMCNC: 33.8 G/DL (ref 31.5–35.7)
MCV RBC AUTO: 93.2 FL (ref 79–97)
PLATELET # BLD AUTO: 390 10*3/MM3 (ref 140–450)
PMV BLD AUTO: 9.1 FL (ref 6–12)
POTASSIUM SERPL-SCNC: 5.1 MMOL/L (ref 3.5–5.2)
RBC # BLD AUTO: 4.38 10*6/MM3 (ref 3.77–5.28)
SARS-COV-2 ORF1AB RESP QL NAA+PROBE: NOT DETECTED
SODIUM SERPL-SCNC: 137 MMOL/L (ref 136–145)
WBC # BLD AUTO: 8.9 10*3/MM3 (ref 3.4–10.8)

## 2021-04-30 PROCEDURE — 84703 CHORIONIC GONADOTROPIN ASSAY: CPT

## 2021-04-30 PROCEDURE — 85027 COMPLETE CBC AUTOMATED: CPT

## 2021-04-30 PROCEDURE — 80048 BASIC METABOLIC PNL TOTAL CA: CPT

## 2021-04-30 PROCEDURE — 36415 COLL VENOUS BLD VENIPUNCTURE: CPT

## 2021-04-30 PROCEDURE — U0004 COV-19 TEST NON-CDC HGH THRU: HCPCS | Performed by: NURSE PRACTITIONER

## 2021-04-30 PROCEDURE — C9803 HOPD COVID-19 SPEC COLLECT: HCPCS | Performed by: NURSE PRACTITIONER

## 2021-04-30 RX ORDER — CHLORHEXIDINE GLUCONATE 500 MG/1
1 CLOTH TOPICAL TAKE AS DIRECTED
COMMUNITY
End: 2021-08-23

## 2021-04-30 RX ORDER — MULTIPLE VITAMINS W/ MINERALS TAB 9MG-400MCG
1 TAB ORAL DAILY
COMMUNITY

## 2021-05-03 ENCOUNTER — ANESTHESIA EVENT (OUTPATIENT)
Dept: PERIOP | Facility: HOSPITAL | Age: 51
End: 2021-05-03

## 2021-05-03 ENCOUNTER — HOSPITAL ENCOUNTER (OUTPATIENT)
Facility: HOSPITAL | Age: 51
Discharge: HOME | End: 2021-05-04
Attending: OBSTETRICS & GYNECOLOGY | Admitting: OBSTETRICS & GYNECOLOGY

## 2021-05-03 ENCOUNTER — ANESTHESIA (OUTPATIENT)
Dept: PERIOP | Facility: HOSPITAL | Age: 51
End: 2021-05-03

## 2021-05-03 DIAGNOSIS — Z91.89 HIGH RISK OF OVARIAN CANCER: ICD-10-CM

## 2021-05-03 LAB
HCT VFR BLD AUTO: 43.7 % (ref 34–46.6)
HGB BLD-MCNC: 14.9 G/DL (ref 12–15.9)

## 2021-05-03 PROCEDURE — 25010000002 FENTANYL CITRATE (PF) 100 MCG/2ML SOLUTION: Performed by: ANESTHESIOLOGY

## 2021-05-03 PROCEDURE — 85018 HEMOGLOBIN: CPT | Performed by: OBSTETRICS & GYNECOLOGY

## 2021-05-03 PROCEDURE — 25010000002 PHENYLEPHRINE PER 1 ML: Performed by: NURSE ANESTHETIST, CERTIFIED REGISTERED

## 2021-05-03 PROCEDURE — 58661 LAPAROSCOPY REMOVE ADNEXA: CPT | Performed by: OBSTETRICS & GYNECOLOGY

## 2021-05-03 PROCEDURE — 25010000002 HYDROMORPHONE PER 4 MG: Performed by: NURSE ANESTHETIST, CERTIFIED REGISTERED

## 2021-05-03 PROCEDURE — 25010000002 MIDAZOLAM PER 1 MG: Performed by: ANESTHESIOLOGY

## 2021-05-03 PROCEDURE — 25010000002 NEOSTIGMINE 5 MG/10ML SOLUTION: Performed by: NURSE ANESTHETIST, CERTIFIED REGISTERED

## 2021-05-03 PROCEDURE — 25010000002 ONDANSETRON PER 1 MG: Performed by: ANESTHESIOLOGY

## 2021-05-03 PROCEDURE — 85014 HEMATOCRIT: CPT | Performed by: OBSTETRICS & GYNECOLOGY

## 2021-05-03 PROCEDURE — 58661 LAPAROSCOPY REMOVE ADNEXA: CPT | Performed by: STUDENT IN AN ORGANIZED HEALTH CARE EDUCATION/TRAINING PROGRAM

## 2021-05-03 PROCEDURE — 25010000002 PROPOFOL 10 MG/ML EMULSION: Performed by: NURSE ANESTHETIST, CERTIFIED REGISTERED

## 2021-05-03 PROCEDURE — 25010000002 DEXAMETHASONE PER 1 MG: Performed by: NURSE ANESTHETIST, CERTIFIED REGISTERED

## 2021-05-03 PROCEDURE — G0378 HOSPITAL OBSERVATION PER HR: HCPCS

## 2021-05-03 PROCEDURE — 25010000002 ONDANSETRON PER 1 MG: Performed by: NURSE ANESTHETIST, CERTIFIED REGISTERED

## 2021-05-03 PROCEDURE — 25010000002 FENTANYL CITRATE (PF) 100 MCG/2ML SOLUTION: Performed by: NURSE ANESTHETIST, CERTIFIED REGISTERED

## 2021-05-03 PROCEDURE — 25010000002 KETOROLAC TROMETHAMINE PER 15 MG: Performed by: NURSE ANESTHETIST, CERTIFIED REGISTERED

## 2021-05-03 PROCEDURE — 88307 TISSUE EXAM BY PATHOLOGIST: CPT | Performed by: OBSTETRICS & GYNECOLOGY

## 2021-05-03 RX ORDER — SODIUM CHLORIDE, SODIUM LACTATE, POTASSIUM CHLORIDE, CALCIUM CHLORIDE 600; 310; 30; 20 MG/100ML; MG/100ML; MG/100ML; MG/100ML
9 INJECTION, SOLUTION INTRAVENOUS CONTINUOUS
Status: DISCONTINUED | OUTPATIENT
Start: 2021-05-03 | End: 2021-05-03

## 2021-05-03 RX ORDER — ONDANSETRON 2 MG/ML
INJECTION INTRAMUSCULAR; INTRAVENOUS AS NEEDED
Status: DISCONTINUED | OUTPATIENT
Start: 2021-05-03 | End: 2021-05-03 | Stop reason: SURG

## 2021-05-03 RX ORDER — FENTANYL CITRATE 50 UG/ML
50 INJECTION, SOLUTION INTRAMUSCULAR; INTRAVENOUS
Status: DISCONTINUED | OUTPATIENT
Start: 2021-05-03 | End: 2021-05-03 | Stop reason: HOSPADM

## 2021-05-03 RX ORDER — SODIUM CHLORIDE 0.9 % (FLUSH) 0.9 %
3 SYRINGE (ML) INJECTION EVERY 12 HOURS SCHEDULED
Status: DISCONTINUED | OUTPATIENT
Start: 2021-05-03 | End: 2021-05-03 | Stop reason: HOSPADM

## 2021-05-03 RX ORDER — MIDAZOLAM HYDROCHLORIDE 1 MG/ML
2 INJECTION INTRAMUSCULAR; INTRAVENOUS
Status: DISCONTINUED | OUTPATIENT
Start: 2021-05-03 | End: 2021-05-03 | Stop reason: HOSPADM

## 2021-05-03 RX ORDER — KETOROLAC TROMETHAMINE 30 MG/ML
INJECTION, SOLUTION INTRAMUSCULAR; INTRAVENOUS AS NEEDED
Status: DISCONTINUED | OUTPATIENT
Start: 2021-05-03 | End: 2021-05-03 | Stop reason: SURG

## 2021-05-03 RX ORDER — OXYCODONE HYDROCHLORIDE AND ACETAMINOPHEN 5; 325 MG/1; MG/1
1 TABLET ORAL EVERY 4 HOURS PRN
Status: DISCONTINUED | OUTPATIENT
Start: 2021-05-03 | End: 2021-05-04 | Stop reason: HOSPADM

## 2021-05-03 RX ORDER — LIDOCAINE HYDROCHLORIDE 20 MG/ML
INJECTION, SOLUTION INFILTRATION; PERINEURAL AS NEEDED
Status: DISCONTINUED | OUTPATIENT
Start: 2021-05-03 | End: 2021-05-03 | Stop reason: SURG

## 2021-05-03 RX ORDER — MAGNESIUM HYDROXIDE 1200 MG/15ML
LIQUID ORAL AS NEEDED
Status: DISCONTINUED | OUTPATIENT
Start: 2021-05-03 | End: 2021-05-03 | Stop reason: HOSPADM

## 2021-05-03 RX ORDER — DEXAMETHASONE SODIUM PHOSPHATE 10 MG/ML
INJECTION INTRAMUSCULAR; INTRAVENOUS AS NEEDED
Status: DISCONTINUED | OUTPATIENT
Start: 2021-05-03 | End: 2021-05-03 | Stop reason: SURG

## 2021-05-03 RX ORDER — ONDANSETRON 2 MG/ML
4 INJECTION INTRAMUSCULAR; INTRAVENOUS ONCE AS NEEDED
Status: COMPLETED | OUTPATIENT
Start: 2021-05-03 | End: 2021-05-03

## 2021-05-03 RX ORDER — SODIUM CHLORIDE 0.9 % (FLUSH) 0.9 %
10 SYRINGE (ML) INJECTION AS NEEDED
Status: DISCONTINUED | OUTPATIENT
Start: 2021-05-03 | End: 2021-05-03 | Stop reason: HOSPADM

## 2021-05-03 RX ORDER — OXYCODONE AND ACETAMINOPHEN 10; 325 MG/1; MG/1
1 TABLET ORAL EVERY 4 HOURS PRN
Status: DISCONTINUED | OUTPATIENT
Start: 2021-05-03 | End: 2021-05-04 | Stop reason: HOSPADM

## 2021-05-03 RX ORDER — GLYCOPYRROLATE 0.2 MG/ML
INJECTION INTRAMUSCULAR; INTRAVENOUS AS NEEDED
Status: DISCONTINUED | OUTPATIENT
Start: 2021-05-03 | End: 2021-05-03 | Stop reason: SURG

## 2021-05-03 RX ORDER — LIDOCAINE HYDROCHLORIDE AND EPINEPHRINE 10; 10 MG/ML; UG/ML
INJECTION, SOLUTION INFILTRATION; PERINEURAL AS NEEDED
Status: DISCONTINUED | OUTPATIENT
Start: 2021-05-03 | End: 2021-05-03 | Stop reason: HOSPADM

## 2021-05-03 RX ORDER — ROCURONIUM BROMIDE 10 MG/ML
INJECTION, SOLUTION INTRAVENOUS AS NEEDED
Status: DISCONTINUED | OUTPATIENT
Start: 2021-05-03 | End: 2021-05-03 | Stop reason: SURG

## 2021-05-03 RX ORDER — PROPOFOL 10 MG/ML
VIAL (ML) INTRAVENOUS AS NEEDED
Status: DISCONTINUED | OUTPATIENT
Start: 2021-05-03 | End: 2021-05-03 | Stop reason: SURG

## 2021-05-03 RX ORDER — FAMOTIDINE 10 MG/ML
20 INJECTION, SOLUTION INTRAVENOUS ONCE
Status: COMPLETED | OUTPATIENT
Start: 2021-05-03 | End: 2021-05-03

## 2021-05-03 RX ORDER — LIDOCAINE HYDROCHLORIDE 10 MG/ML
0.5 INJECTION, SOLUTION EPIDURAL; INFILTRATION; INTRACAUDAL; PERINEURAL ONCE AS NEEDED
Status: DISCONTINUED | OUTPATIENT
Start: 2021-05-03 | End: 2021-05-03 | Stop reason: HOSPADM

## 2021-05-03 RX ORDER — SODIUM CHLORIDE 0.9 % (FLUSH) 0.9 %
3-10 SYRINGE (ML) INJECTION AS NEEDED
Status: DISCONTINUED | OUTPATIENT
Start: 2021-05-03 | End: 2021-05-03 | Stop reason: HOSPADM

## 2021-05-03 RX ORDER — TEMAZEPAM 7.5 MG/1
15 CAPSULE ORAL NIGHTLY PRN
Status: DISCONTINUED | OUTPATIENT
Start: 2021-05-03 | End: 2021-05-04 | Stop reason: HOSPADM

## 2021-05-03 RX ORDER — TRIAMCINOLONE ACETONIDE 55 UG/1
2 SPRAY, METERED NASAL DAILY
COMMUNITY

## 2021-05-03 RX ORDER — SODIUM CHLORIDE 9 MG/ML
INJECTION, SOLUTION INTRAVENOUS AS NEEDED
Status: DISCONTINUED | OUTPATIENT
Start: 2021-05-03 | End: 2021-05-03 | Stop reason: HOSPADM

## 2021-05-03 RX ORDER — IBUPROFEN 400 MG/1
400 TABLET ORAL EVERY 6 HOURS PRN
Status: DISCONTINUED | OUTPATIENT
Start: 2021-05-03 | End: 2021-05-04 | Stop reason: HOSPADM

## 2021-05-03 RX ORDER — FENTANYL CITRATE 50 UG/ML
INJECTION, SOLUTION INTRAMUSCULAR; INTRAVENOUS AS NEEDED
Status: DISCONTINUED | OUTPATIENT
Start: 2021-05-03 | End: 2021-05-03 | Stop reason: SURG

## 2021-05-03 RX ORDER — HYDROMORPHONE HYDROCHLORIDE 1 MG/ML
0.5 INJECTION, SOLUTION INTRAMUSCULAR; INTRAVENOUS; SUBCUTANEOUS
Status: DISCONTINUED | OUTPATIENT
Start: 2021-05-03 | End: 2021-05-04 | Stop reason: HOSPADM

## 2021-05-03 RX ORDER — MIDAZOLAM HYDROCHLORIDE 1 MG/ML
1 INJECTION INTRAMUSCULAR; INTRAVENOUS
Status: DISCONTINUED | OUTPATIENT
Start: 2021-05-03 | End: 2021-05-03 | Stop reason: HOSPADM

## 2021-05-03 RX ORDER — NEOSTIGMINE METHYLSULFATE 0.5 MG/ML
INJECTION, SOLUTION INTRAVENOUS AS NEEDED
Status: DISCONTINUED | OUTPATIENT
Start: 2021-05-03 | End: 2021-05-03 | Stop reason: SURG

## 2021-05-03 RX ORDER — HYDROMORPHONE HCL 110MG/55ML
PATIENT CONTROLLED ANALGESIA SYRINGE INTRAVENOUS AS NEEDED
Status: DISCONTINUED | OUTPATIENT
Start: 2021-05-03 | End: 2021-05-03 | Stop reason: SURG

## 2021-05-03 RX ORDER — DOCUSATE SODIUM 100 MG/1
100 CAPSULE, LIQUID FILLED ORAL 2 TIMES DAILY
Status: DISCONTINUED | OUTPATIENT
Start: 2021-05-03 | End: 2021-05-04 | Stop reason: HOSPADM

## 2021-05-03 RX ORDER — HYDROCODONE BITARTRATE AND ACETAMINOPHEN 7.5; 325 MG/1; MG/1
1 TABLET ORAL ONCE AS NEEDED
Status: DISCONTINUED | OUTPATIENT
Start: 2021-05-03 | End: 2021-05-03 | Stop reason: HOSPADM

## 2021-05-03 RX ORDER — NALOXONE HCL 0.4 MG/ML
0.1 VIAL (ML) INJECTION
Status: DISCONTINUED | OUTPATIENT
Start: 2021-05-03 | End: 2021-05-04 | Stop reason: HOSPADM

## 2021-05-03 RX ORDER — ENALAPRILAT 2.5 MG/2ML
1.25 INJECTION INTRAVENOUS ONCE AS NEEDED
Status: DISCONTINUED | OUTPATIENT
Start: 2021-05-03 | End: 2021-05-03 | Stop reason: HOSPADM

## 2021-05-03 RX ORDER — HYDROMORPHONE HYDROCHLORIDE 1 MG/ML
0.5 INJECTION, SOLUTION INTRAMUSCULAR; INTRAVENOUS; SUBCUTANEOUS
Status: DISCONTINUED | OUTPATIENT
Start: 2021-05-03 | End: 2021-05-03 | Stop reason: HOSPADM

## 2021-05-03 RX ORDER — OXYCODONE AND ACETAMINOPHEN 7.5; 325 MG/1; MG/1
1 TABLET ORAL ONCE AS NEEDED
Status: DISCONTINUED | OUTPATIENT
Start: 2021-05-03 | End: 2021-05-03 | Stop reason: HOSPADM

## 2021-05-03 RX ORDER — SCOLOPAMINE TRANSDERMAL SYSTEM 1 MG/1
1 PATCH, EXTENDED RELEASE TRANSDERMAL ONCE
Status: DISCONTINUED | OUTPATIENT
Start: 2021-05-03 | End: 2021-05-03

## 2021-05-03 RX ADMIN — DEXAMETHASONE SODIUM PHOSPHATE 6 MG: 10 INJECTION INTRAMUSCULAR; INTRAVENOUS at 13:33

## 2021-05-03 RX ADMIN — PHENYLEPHRINE HYDROCHLORIDE 100 MCG: 10 INJECTION INTRAVENOUS at 13:36

## 2021-05-03 RX ADMIN — FENTANYL CITRATE 50 MCG: 50 INJECTION, SOLUTION INTRAMUSCULAR; INTRAVENOUS at 14:45

## 2021-05-03 RX ADMIN — FENTANYL CITRATE 50 MCG: 50 INJECTION INTRAMUSCULAR; INTRAVENOUS at 13:16

## 2021-05-03 RX ADMIN — GLYCOPYRROLATE 0.6 MG: 0.2 INJECTION INTRAMUSCULAR; INTRAVENOUS at 14:20

## 2021-05-03 RX ADMIN — SUGAMMADEX 200 MG: 100 INJECTION, SOLUTION INTRAVENOUS at 14:35

## 2021-05-03 RX ADMIN — HYDROMORPHONE HYDROCHLORIDE 0.5 MG: 2 INJECTION, SOLUTION INTRAMUSCULAR; INTRAVENOUS; SUBCUTANEOUS at 14:41

## 2021-05-03 RX ADMIN — FENTANYL CITRATE 50 MCG: 50 INJECTION, SOLUTION INTRAMUSCULAR; INTRAVENOUS at 14:55

## 2021-05-03 RX ADMIN — IBUPROFEN 400 MG: 400 TABLET, FILM COATED ORAL at 22:20

## 2021-05-03 RX ADMIN — LIDOCAINE HYDROCHLORIDE 100 MG: 20 INJECTION, SOLUTION INFILTRATION; PERINEURAL at 13:21

## 2021-05-03 RX ADMIN — ONDANSETRON 4 MG: 2 INJECTION INTRAMUSCULAR; INTRAVENOUS at 13:39

## 2021-05-03 RX ADMIN — PHENYLEPHRINE HYDROCHLORIDE 100 MCG: 10 INJECTION INTRAVENOUS at 13:30

## 2021-05-03 RX ADMIN — KETOROLAC TROMETHAMINE 30 MG: 30 INJECTION, SOLUTION INTRAMUSCULAR at 14:21

## 2021-05-03 RX ADMIN — HYDROMORPHONE HYDROCHLORIDE 0.5 MG: 2 INJECTION, SOLUTION INTRAMUSCULAR; INTRAVENOUS; SUBCUTANEOUS at 14:34

## 2021-05-03 RX ADMIN — DOCUSATE SODIUM 100 MG: 100 CAPSULE, LIQUID FILLED ORAL at 22:20

## 2021-05-03 RX ADMIN — PHENYLEPHRINE HYDROCHLORIDE 100 MCG: 10 INJECTION INTRAVENOUS at 13:33

## 2021-05-03 RX ADMIN — PROPOFOL 100 MCG/KG/MIN: 10 INJECTION, EMULSION INTRAVENOUS at 13:33

## 2021-05-03 RX ADMIN — NEOSTIGMINE METHYLSULFATE 3.5 MG: 0.5 INJECTION INTRAVENOUS at 14:20

## 2021-05-03 RX ADMIN — SCOLOPAMINE TRANSDERMAL SYSTEM 1 PATCH: 1 PATCH, EXTENDED RELEASE TRANSDERMAL at 12:33

## 2021-05-03 RX ADMIN — SODIUM CHLORIDE, POTASSIUM CHLORIDE, SODIUM LACTATE AND CALCIUM CHLORIDE: 600; 310; 30; 20 INJECTION, SOLUTION INTRAVENOUS at 14:12

## 2021-05-03 RX ADMIN — GLYCOPYRROLATE 0.2 MG: 0.2 INJECTION INTRAMUSCULAR; INTRAVENOUS at 13:29

## 2021-05-03 RX ADMIN — PROPOFOL 200 MG: 10 INJECTION, EMULSION INTRAVENOUS at 13:21

## 2021-05-03 RX ADMIN — HYDROMORPHONE HYDROCHLORIDE 1 MG: 2 INJECTION, SOLUTION INTRAMUSCULAR; INTRAVENOUS; SUBCUTANEOUS at 14:45

## 2021-05-03 RX ADMIN — MIDAZOLAM 1 MG: 1 INJECTION INTRAMUSCULAR; INTRAVENOUS at 13:14

## 2021-05-03 RX ADMIN — FENTANYL CITRATE 50 MCG: 50 INJECTION INTRAMUSCULAR; INTRAVENOUS at 13:43

## 2021-05-03 RX ADMIN — FAMOTIDINE 20 MG: 10 INJECTION INTRAVENOUS at 12:33

## 2021-05-03 RX ADMIN — PHENYLEPHRINE HYDROCHLORIDE 100 MCG: 10 INJECTION INTRAVENOUS at 13:27

## 2021-05-03 RX ADMIN — SODIUM CHLORIDE, POTASSIUM CHLORIDE, SODIUM LACTATE AND CALCIUM CHLORIDE 9 ML/HR: 600; 310; 30; 20 INJECTION, SOLUTION INTRAVENOUS at 12:33

## 2021-05-03 RX ADMIN — ROCURONIUM BROMIDE 50 MG: 50 INJECTION INTRAVENOUS at 13:21

## 2021-05-03 RX ADMIN — ONDANSETRON 4 MG: 2 INJECTION INTRAMUSCULAR; INTRAVENOUS at 16:22

## 2021-05-04 ENCOUNTER — READMISSION MANAGEMENT (OUTPATIENT)
Dept: CALL CENTER | Facility: HOSPITAL | Age: 51
End: 2021-05-04

## 2021-05-04 VITALS
OXYGEN SATURATION: 90 % | TEMPERATURE: 97.7 F | SYSTOLIC BLOOD PRESSURE: 101 MMHG | RESPIRATION RATE: 16 BRPM | HEART RATE: 80 BPM | DIASTOLIC BLOOD PRESSURE: 61 MMHG

## 2021-05-04 LAB
DEPRECATED RDW RBC AUTO: 39.3 FL (ref 37–54)
ERYTHROCYTE [DISTWIDTH] IN BLOOD BY AUTOMATED COUNT: 11.8 % (ref 12.3–15.4)
HCT VFR BLD AUTO: 39.8 % (ref 34–46.6)
HGB BLD-MCNC: 13.4 G/DL (ref 12–15.9)
LAB AP CASE REPORT: NORMAL
MCH RBC QN AUTO: 30.8 PG (ref 26.6–33)
MCHC RBC AUTO-ENTMCNC: 33.7 G/DL (ref 31.5–35.7)
MCV RBC AUTO: 91.5 FL (ref 79–97)
PATH REPORT.FINAL DX SPEC: NORMAL
PATH REPORT.GROSS SPEC: NORMAL
PLATELET # BLD AUTO: 324 10*3/MM3 (ref 140–450)
PMV BLD AUTO: 9.2 FL (ref 6–12)
RBC # BLD AUTO: 4.35 10*6/MM3 (ref 3.77–5.28)
WBC # BLD AUTO: 15.07 10*3/MM3 (ref 3.4–10.8)

## 2021-05-04 PROCEDURE — 85027 COMPLETE CBC AUTOMATED: CPT | Performed by: OBSTETRICS & GYNECOLOGY

## 2021-05-04 PROCEDURE — G0378 HOSPITAL OBSERVATION PER HR: HCPCS

## 2021-05-04 RX ORDER — IBUPROFEN 400 MG/1
400 TABLET ORAL EVERY 6 HOURS PRN
Qty: 30 TABLET | Refills: 0 | Status: SHIPPED | OUTPATIENT
Start: 2021-05-04 | End: 2021-08-23

## 2021-05-04 RX ORDER — OXYCODONE AND ACETAMINOPHEN 10; 325 MG/1; MG/1
1 TABLET ORAL EVERY 4 HOURS PRN
Qty: 20 TABLET | Refills: 0 | Status: SHIPPED | OUTPATIENT
Start: 2021-05-04 | End: 2021-05-10

## 2021-05-04 RX ORDER — PROGESTERONE 100 MG/1
100 CAPSULE ORAL DAILY
Qty: 30 CAPSULE | Refills: 11 | Status: SHIPPED | OUTPATIENT
Start: 2021-05-04 | End: 2021-08-23

## 2021-05-04 RX ORDER — ESTRADIOL 0.75 MG/1.25G
1.25 GEL, METERED TOPICAL DAILY
Qty: 50 G | Refills: 12 | Status: SHIPPED | OUTPATIENT
Start: 2021-05-04 | End: 2021-08-23

## 2021-05-04 RX ADMIN — DOCUSATE SODIUM 100 MG: 100 CAPSULE, LIQUID FILLED ORAL at 08:25

## 2021-05-04 RX ADMIN — IBUPROFEN 400 MG: 400 TABLET, FILM COATED ORAL at 06:33

## 2021-05-05 ENCOUNTER — TRANSITIONAL CARE MANAGEMENT TELEPHONE ENCOUNTER (OUTPATIENT)
Dept: CALL CENTER | Facility: HOSPITAL | Age: 51
End: 2021-05-05

## 2021-05-13 ENCOUNTER — OFFICE VISIT (OUTPATIENT)
Dept: INTERNAL MEDICINE | Age: 51
End: 2021-05-13

## 2021-05-13 VITALS
BODY MASS INDEX: 30.41 KG/M2 | WEIGHT: 171.6 LBS | SYSTOLIC BLOOD PRESSURE: 110 MMHG | HEART RATE: 77 BPM | OXYGEN SATURATION: 95 % | HEIGHT: 63 IN | TEMPERATURE: 97.3 F | DIASTOLIC BLOOD PRESSURE: 76 MMHG

## 2021-05-13 DIAGNOSIS — Z90.722 S/P BILATERAL OOPHORECTOMY: ICD-10-CM

## 2021-05-13 DIAGNOSIS — Z09 HOSPITAL DISCHARGE FOLLOW-UP: Primary | ICD-10-CM

## 2021-05-13 PROCEDURE — 99212 OFFICE O/P EST SF 10 MIN: CPT | Performed by: NURSE PRACTITIONER

## 2021-05-18 ENCOUNTER — IMMUNIZATION (OUTPATIENT)
Dept: VACCINE CLINIC | Facility: HOSPITAL | Age: 51
End: 2021-05-18

## 2021-05-18 ENCOUNTER — OFFICE VISIT (OUTPATIENT)
Dept: OBSTETRICS AND GYNECOLOGY | Facility: CLINIC | Age: 51
End: 2021-05-18

## 2021-05-18 VITALS
DIASTOLIC BLOOD PRESSURE: 67 MMHG | WEIGHT: 171 LBS | BODY MASS INDEX: 30.3 KG/M2 | HEIGHT: 63 IN | SYSTOLIC BLOOD PRESSURE: 118 MMHG

## 2021-05-18 DIAGNOSIS — Z09 POSTOP CHECK: Primary | ICD-10-CM

## 2021-05-18 PROCEDURE — 99024 POSTOP FOLLOW-UP VISIT: CPT | Performed by: OBSTETRICS & GYNECOLOGY

## 2021-05-18 PROCEDURE — 91300 HC SARSCOV02 VAC 30MCG/0.3ML IM: CPT | Performed by: INTERNAL MEDICINE

## 2021-05-18 PROCEDURE — 0001A: CPT | Performed by: INTERNAL MEDICINE

## 2021-06-15 ENCOUNTER — OFFICE VISIT (OUTPATIENT)
Dept: OBSTETRICS AND GYNECOLOGY | Facility: CLINIC | Age: 51
End: 2021-06-15

## 2021-06-15 VITALS
HEIGHT: 63 IN | BODY MASS INDEX: 30.3 KG/M2 | DIASTOLIC BLOOD PRESSURE: 67 MMHG | SYSTOLIC BLOOD PRESSURE: 118 MMHG | WEIGHT: 171 LBS

## 2021-06-15 DIAGNOSIS — Z91.89 AT HIGH RISK FOR BREAST CANCER: ICD-10-CM

## 2021-06-15 DIAGNOSIS — Z00.00 ANNUAL PHYSICAL EXAM: Primary | ICD-10-CM

## 2021-06-15 PROCEDURE — 99396 PREV VISIT EST AGE 40-64: CPT | Performed by: OBSTETRICS & GYNECOLOGY

## 2021-06-28 ENCOUNTER — TELEPHONE (OUTPATIENT)
Dept: OBSTETRICS AND GYNECOLOGY | Facility: CLINIC | Age: 51
End: 2021-06-28

## 2021-07-20 RX ORDER — ESCITALOPRAM OXALATE 10 MG/1
10 TABLET ORAL DAILY
Qty: 90 TABLET | Refills: 3 | Status: SHIPPED | OUTPATIENT
Start: 2021-07-20 | End: 2022-01-18 | Stop reason: SDUPTHER

## 2021-08-13 DIAGNOSIS — Z00.00 ROUTINE ADULT HEALTH MAINTENANCE: ICD-10-CM

## 2021-08-13 DIAGNOSIS — Z11.59 NEED FOR HEPATITIS C SCREENING TEST: Primary | ICD-10-CM

## 2021-08-17 LAB
ALBUMIN SERPL-MCNC: 4.7 G/DL (ref 3.5–5.2)
ALBUMIN/GLOB SERPL: 1.8 G/DL
ALP SERPL-CCNC: 121 U/L (ref 39–117)
ALT SERPL-CCNC: 14 U/L (ref 1–33)
APPEARANCE UR: CLEAR
AST SERPL-CCNC: 17 U/L (ref 1–32)
BACTERIA #/AREA URNS HPF: ABNORMAL /HPF
BASOPHILS # BLD AUTO: 0.04 10*3/MM3 (ref 0–0.2)
BASOPHILS NFR BLD AUTO: 0.5 % (ref 0–1.5)
BILIRUB SERPL-MCNC: 0.5 MG/DL (ref 0–1.2)
BILIRUB UR QL STRIP: NEGATIVE
BUN SERPL-MCNC: 15 MG/DL (ref 6–20)
BUN/CREAT SERPL: 15 (ref 7–25)
CALCIUM SERPL-MCNC: 10.5 MG/DL (ref 8.6–10.5)
CHLORIDE SERPL-SCNC: 103 MMOL/L (ref 98–107)
CHOLEST SERPL-MCNC: 231 MG/DL (ref 0–200)
CHOLEST/HDLC SERPL: 2.92 {RATIO}
CO2 SERPL-SCNC: 28 MMOL/L (ref 22–29)
COLOR UR: ABNORMAL
CREAT SERPL-MCNC: 1 MG/DL (ref 0.57–1)
CRYSTALS URNS MICRO: ABNORMAL
EOSINOPHIL # BLD AUTO: 0.14 10*3/MM3 (ref 0–0.4)
EOSINOPHIL NFR BLD AUTO: 1.8 % (ref 0.3–6.2)
EPI CELLS #/AREA URNS HPF: ABNORMAL /HPF
ERYTHROCYTE [DISTWIDTH] IN BLOOD BY AUTOMATED COUNT: 12.3 % (ref 12.3–15.4)
GLOBULIN SER CALC-MCNC: 2.6 GM/DL
GLUCOSE SERPL-MCNC: 95 MG/DL (ref 65–99)
GLUCOSE UR QL: NEGATIVE
HCT VFR BLD AUTO: 41.9 % (ref 34–46.6)
HCV AB S/CO SERPL IA: <0.1 S/CO RATIO (ref 0–0.9)
HDLC SERPL-MCNC: 79 MG/DL (ref 40–60)
HGB BLD-MCNC: 14.1 G/DL (ref 12–15.9)
HGB UR QL STRIP: ABNORMAL
IMM GRANULOCYTES # BLD AUTO: 0.04 10*3/MM3 (ref 0–0.05)
IMM GRANULOCYTES NFR BLD AUTO: 0.5 % (ref 0–0.5)
KETONES UR QL STRIP: ABNORMAL
LDLC SERPL CALC-MCNC: 137 MG/DL (ref 0–100)
LEUKOCYTE ESTERASE UR QL STRIP: NEGATIVE
LYMPHOCYTES # BLD AUTO: 2.17 10*3/MM3 (ref 0.7–3.1)
LYMPHOCYTES NFR BLD AUTO: 27.4 % (ref 19.6–45.3)
MCH RBC QN AUTO: 30.7 PG (ref 26.6–33)
MCHC RBC AUTO-ENTMCNC: 33.7 G/DL (ref 31.5–35.7)
MCV RBC AUTO: 91.3 FL (ref 79–97)
MONOCYTES # BLD AUTO: 0.62 10*3/MM3 (ref 0.1–0.9)
MONOCYTES NFR BLD AUTO: 7.8 % (ref 5–12)
NEUTROPHILS # BLD AUTO: 4.92 10*3/MM3 (ref 1.7–7)
NEUTROPHILS NFR BLD AUTO: 62 % (ref 42.7–76)
NITRITE UR QL STRIP: NEGATIVE
NRBC BLD AUTO-RTO: 0 /100 WBC (ref 0–0.2)
PH UR STRIP: 5.5 [PH] (ref 5–8)
PLATELET # BLD AUTO: 386 10*3/MM3 (ref 140–450)
POTASSIUM SERPL-SCNC: 4.7 MMOL/L (ref 3.5–5.2)
PROT SERPL-MCNC: 7.3 G/DL (ref 6–8.5)
PROT UR QL STRIP: NEGATIVE
RBC # BLD AUTO: 4.59 10*6/MM3 (ref 3.77–5.28)
RBC #/AREA URNS HPF: ABNORMAL /HPF
SODIUM SERPL-SCNC: 138 MMOL/L (ref 136–145)
SP GR UR: 1.03 (ref 1–1.03)
TRIGL SERPL-MCNC: 87 MG/DL (ref 0–150)
TSH SERPL DL<=0.005 MIU/L-ACNC: 2.34 UIU/ML (ref 0.27–4.2)
UROBILINOGEN UR STRIP-MCNC: ABNORMAL MG/DL
VLDLC SERPL CALC-MCNC: 15 MG/DL (ref 5–40)
WBC # BLD AUTO: 7.93 10*3/MM3 (ref 3.4–10.8)
WBC #/AREA URNS HPF: ABNORMAL /HPF

## 2021-08-23 ENCOUNTER — OFFICE VISIT (OUTPATIENT)
Dept: INTERNAL MEDICINE | Age: 51
End: 2021-08-23

## 2021-08-23 VITALS
TEMPERATURE: 97.3 F | WEIGHT: 170 LBS | HEART RATE: 94 BPM | OXYGEN SATURATION: 98 % | DIASTOLIC BLOOD PRESSURE: 72 MMHG | SYSTOLIC BLOOD PRESSURE: 124 MMHG | BODY MASS INDEX: 30.12 KG/M2 | HEIGHT: 63 IN

## 2021-08-23 DIAGNOSIS — Z00.00 ANNUAL PHYSICAL EXAM: Primary | ICD-10-CM

## 2021-08-23 DIAGNOSIS — R31.9 HEMATURIA, UNSPECIFIED TYPE: ICD-10-CM

## 2021-08-23 DIAGNOSIS — R74.8 ELEVATED ALKALINE PHOSPHATASE LEVEL: ICD-10-CM

## 2021-08-23 DIAGNOSIS — Z23 NEED FOR SHINGLES VACCINE: ICD-10-CM

## 2021-08-23 PROCEDURE — 90471 IMMUNIZATION ADMIN: CPT | Performed by: NURSE PRACTITIONER

## 2021-08-23 PROCEDURE — 99396 PREV VISIT EST AGE 40-64: CPT | Performed by: NURSE PRACTITIONER

## 2021-08-23 PROCEDURE — 90750 HZV VACC RECOMBINANT IM: CPT | Performed by: NURSE PRACTITIONER

## 2021-08-24 LAB
APPEARANCE UR: CLEAR
BACTERIA #/AREA URNS HPF: NORMAL /HPF
BILIRUB UR QL STRIP: NEGATIVE
CASTS URNS QL MICRO: NORMAL /LPF
COLOR UR: YELLOW
EPI CELLS #/AREA URNS HPF: NORMAL /HPF (ref 0–10)
GLUCOSE UR QL: NEGATIVE
HGB UR QL STRIP: NEGATIVE
KETONES UR QL STRIP: NEGATIVE
LEUKOCYTE ESTERASE UR QL STRIP: NEGATIVE
MICRO URNS: NORMAL
MICRO URNS: NORMAL
NITRITE UR QL STRIP: NEGATIVE
PH UR STRIP: 5.5 [PH] (ref 5–7.5)
PROT UR QL STRIP: NEGATIVE
RBC #/AREA URNS HPF: NORMAL /HPF (ref 0–2)
SP GR UR: 1.02 (ref 1–1.03)
URINALYSIS REFLEX: NORMAL
UROBILINOGEN UR STRIP-MCNC: 0.2 MG/DL (ref 0.2–1)
WBC #/AREA URNS HPF: NORMAL /HPF (ref 0–5)

## 2021-09-07 ENCOUNTER — TELEPHONE (OUTPATIENT)
Dept: OTHER | Facility: CLINIC | Age: 51
End: 2021-09-07

## 2021-09-28 ENCOUNTER — IMMUNIZATION (OUTPATIENT)
Dept: VACCINE CLINIC | Facility: HOSPITAL | Age: 51
End: 2021-09-28

## 2021-09-28 ENCOUNTER — HOSPITAL ENCOUNTER (OUTPATIENT)
Dept: MRI IMAGING | Facility: HOSPITAL | Age: 51
Discharge: HOME | End: 2021-09-28

## 2021-09-28 DIAGNOSIS — Z91.89 AT HIGH RISK FOR BREAST CANCER: ICD-10-CM

## 2021-09-28 PROCEDURE — A9577 INJ MULTIHANCE: HCPCS | Performed by: INTERNAL MEDICINE

## 2021-09-28 PROCEDURE — 91300 HC SARSCOV02 VAC 30MCG/0.3ML IM: CPT | Performed by: INTERNAL MEDICINE

## 2021-09-28 PROCEDURE — 0002A: CPT | Performed by: INTERNAL MEDICINE

## 2021-09-28 PROCEDURE — 77049 MRI BREAST C-+ W/CAD BI: CPT

## 2021-09-28 PROCEDURE — 0 GADOBENATE DIMEGLUMINE 529 MG/ML SOLUTION: Performed by: INTERNAL MEDICINE

## 2021-09-28 RX ADMIN — GADOBENATE DIMEGLUMINE 15 ML: 529 INJECTION, SOLUTION INTRAVENOUS at 08:28

## 2021-10-05 ENCOUNTER — OFFICE VISIT (OUTPATIENT)
Dept: ONCOLOGY | Facility: CLINIC | Age: 51
End: 2021-10-05

## 2021-10-05 ENCOUNTER — LAB (OUTPATIENT)
Dept: LAB | Facility: HOSPITAL | Age: 51
End: 2021-10-05

## 2021-10-05 VITALS
BODY MASS INDEX: 30.58 KG/M2 | SYSTOLIC BLOOD PRESSURE: 119 MMHG | OXYGEN SATURATION: 98 % | HEART RATE: 89 BPM | RESPIRATION RATE: 16 BRPM | WEIGHT: 172.6 LBS | DIASTOLIC BLOOD PRESSURE: 77 MMHG | TEMPERATURE: 97.7 F | HEIGHT: 63 IN

## 2021-10-05 DIAGNOSIS — Z91.89 AT HIGH RISK FOR BREAST CANCER: ICD-10-CM

## 2021-10-05 DIAGNOSIS — Z91.89 AT HIGH RISK FOR BREAST CANCER: Primary | ICD-10-CM

## 2021-10-05 LAB
ALBUMIN SERPL-MCNC: 4.7 G/DL (ref 3.5–5.2)
ALBUMIN/GLOB SERPL: 1.6 G/DL (ref 1.1–2.4)
ALP SERPL-CCNC: 125 U/L (ref 38–116)
ALT SERPL W P-5'-P-CCNC: 21 U/L (ref 0–33)
ANION GAP SERPL CALCULATED.3IONS-SCNC: 11.6 MMOL/L (ref 5–15)
AST SERPL-CCNC: 21 U/L (ref 0–32)
BASOPHILS # BLD AUTO: 0.06 10*3/MM3 (ref 0–0.2)
BASOPHILS NFR BLD AUTO: 0.8 % (ref 0–1.5)
BILIRUB SERPL-MCNC: 0.2 MG/DL (ref 0.2–1.2)
BUN SERPL-MCNC: 17 MG/DL (ref 6–20)
BUN/CREAT SERPL: 16.2 (ref 7.3–30)
CALCIUM SPEC-SCNC: 10.5 MG/DL (ref 8.5–10.2)
CHLORIDE SERPL-SCNC: 102 MMOL/L (ref 98–107)
CO2 SERPL-SCNC: 27.4 MMOL/L (ref 22–29)
CREAT SERPL-MCNC: 1.05 MG/DL (ref 0.6–1.1)
DEPRECATED RDW RBC AUTO: 41.3 FL (ref 37–54)
EOSINOPHIL # BLD AUTO: 0.18 10*3/MM3 (ref 0–0.4)
EOSINOPHIL NFR BLD AUTO: 2.3 % (ref 0.3–6.2)
ERYTHROCYTE [DISTWIDTH] IN BLOOD BY AUTOMATED COUNT: 12.1 % (ref 12.3–15.4)
GFR SERPL CREATININE-BSD FRML MDRD: 55 ML/MIN/1.73
GLOBULIN UR ELPH-MCNC: 3 GM/DL (ref 1.8–3.5)
GLUCOSE SERPL-MCNC: 97 MG/DL (ref 74–124)
HCT VFR BLD AUTO: 42.9 % (ref 34–46.6)
HGB BLD-MCNC: 13.7 G/DL (ref 12–15.9)
IMM GRANULOCYTES # BLD AUTO: 0.02 10*3/MM3 (ref 0–0.05)
IMM GRANULOCYTES NFR BLD AUTO: 0.3 % (ref 0–0.5)
LYMPHOCYTES # BLD AUTO: 2.7 10*3/MM3 (ref 0.7–3.1)
LYMPHOCYTES NFR BLD AUTO: 34.9 % (ref 19.6–45.3)
MCH RBC QN AUTO: 29.5 PG (ref 26.6–33)
MCHC RBC AUTO-ENTMCNC: 31.9 G/DL (ref 31.5–35.7)
MCV RBC AUTO: 92.5 FL (ref 79–97)
MONOCYTES # BLD AUTO: 0.62 10*3/MM3 (ref 0.1–0.9)
MONOCYTES NFR BLD AUTO: 8 % (ref 5–12)
NEUTROPHILS NFR BLD AUTO: 4.16 10*3/MM3 (ref 1.7–7)
NEUTROPHILS NFR BLD AUTO: 53.7 % (ref 42.7–76)
NRBC BLD AUTO-RTO: 0 /100 WBC (ref 0–0.2)
PLATELET # BLD AUTO: 415 10*3/MM3 (ref 140–450)
PMV BLD AUTO: 8.7 FL (ref 6–12)
POTASSIUM SERPL-SCNC: 4.9 MMOL/L (ref 3.5–4.7)
PROT SERPL-MCNC: 7.7 G/DL (ref 6.3–8)
RBC # BLD AUTO: 4.64 10*6/MM3 (ref 3.77–5.28)
SODIUM SERPL-SCNC: 141 MMOL/L (ref 134–145)
WBC # BLD AUTO: 7.74 10*3/MM3 (ref 3.4–10.8)

## 2021-10-05 PROCEDURE — 80053 COMPREHEN METABOLIC PANEL: CPT

## 2021-10-05 PROCEDURE — 99214 OFFICE O/P EST MOD 30 MIN: CPT | Performed by: INTERNAL MEDICINE

## 2021-10-05 PROCEDURE — 85025 COMPLETE CBC W/AUTO DIFF WBC: CPT

## 2021-10-05 PROCEDURE — 36415 COLL VENOUS BLD VENIPUNCTURE: CPT

## 2021-10-18 ASSESSMENT — VISUAL ACUITY
OS_CC: 20/20 SN
OS_CC: 20/30 + SN
OD_CC: 20/20 -2 SN
OD_CC: 20/20 SN
OS_CC: 20/20 - SN
OD_CC: 20/20 SN
OS_CC: 20/20 -2 SN
OS_CC: 20/20 - SN
OS_CC: 20/20 SN
OD_CC: 20/25 + SN
OS_CC: 20/20 SN
OD_CC: 20/20 SN
OD_CC: 20/20 SN
OD_CC: 20/25 SN
OS_CC: 20/30 SN

## 2021-10-18 ASSESSMENT — TONOMETRY
OS_IOP_MMHG: 17
OD_IOP_MMHG: 17
OS_IOP_MMHG: 18
OS_IOP_MMHG: 16
OD_IOP_MMHG: 16
OD_IOP_MMHG: 17
OD_IOP_MMHG: 17
OS_IOP_MMHG: 16

## 2021-10-18 ASSESSMENT — KERATOMETRY
OD_K1POWER_DIOPTERS: 43.25
OS_AXISANGLE_DEGREES: 7
OS_K2POWER_DIOPTERS: 43.75
OD_AXISANGLE_DEGREES: 16
OS_K1POWER_DIOPTERS: 42.75
OS_AXISANGLE2_DEGREES: 97
OD_AXISANGLE2_DEGREES: 106
OD_K2POWER_DIOPTERS: 43.75

## 2022-01-18 ENCOUNTER — OFFICE VISIT (OUTPATIENT)
Dept: INTERNAL MEDICINE | Age: 52
End: 2022-01-18

## 2022-01-18 VITALS
DIASTOLIC BLOOD PRESSURE: 68 MMHG | SYSTOLIC BLOOD PRESSURE: 112 MMHG | HEART RATE: 89 BPM | OXYGEN SATURATION: 99 % | TEMPERATURE: 97.3 F | WEIGHT: 173.4 LBS | BODY MASS INDEX: 30.72 KG/M2 | HEIGHT: 63 IN

## 2022-01-18 DIAGNOSIS — F41.1 GENERALIZED ANXIETY DISORDER: Primary | ICD-10-CM

## 2022-01-18 PROCEDURE — 99213 OFFICE O/P EST LOW 20 MIN: CPT | Performed by: NURSE PRACTITIONER

## 2022-01-18 RX ORDER — ESCITALOPRAM OXALATE 10 MG/1
15 TABLET ORAL DAILY
Qty: 135 TABLET | Refills: 3 | Status: SHIPPED | OUTPATIENT
Start: 2022-01-18 | End: 2023-01-23

## 2022-05-03 ENCOUNTER — APPOINTMENT (OUTPATIENT)
Dept: LAB | Facility: HOSPITAL | Age: 52
End: 2022-05-03

## 2022-05-05 ENCOUNTER — HOSPITAL ENCOUNTER (OUTPATIENT)
Dept: MAMMOGRAPHY | Facility: HOSPITAL | Age: 52
Discharge: HOME | End: 2022-05-05
Admitting: INTERNAL MEDICINE

## 2022-05-05 DIAGNOSIS — Z91.89 AT HIGH RISK FOR BREAST CANCER: ICD-10-CM

## 2022-05-05 PROCEDURE — 77063 BREAST TOMOSYNTHESIS BI: CPT

## 2022-05-05 PROCEDURE — 77067 SCR MAMMO BI INCL CAD: CPT

## 2022-05-11 ENCOUNTER — APPOINTMENT (OUTPATIENT)
Dept: LAB | Facility: HOSPITAL | Age: 52
End: 2022-05-11

## 2022-05-17 ENCOUNTER — OFFICE (OUTPATIENT)
Dept: URBAN - METROPOLITAN AREA CLINIC 66 | Facility: CLINIC | Age: 52
End: 2022-05-17

## 2022-05-17 VITALS
SYSTOLIC BLOOD PRESSURE: 112 MMHG | HEIGHT: 63 IN | HEART RATE: 87 BPM | DIASTOLIC BLOOD PRESSURE: 78 MMHG | WEIGHT: 172 LBS

## 2022-05-17 DIAGNOSIS — R15.9 FULL INCONTINENCE OF FECES: ICD-10-CM

## 2022-05-17 PROCEDURE — 99213 OFFICE O/P EST LOW 20 MIN: CPT | Performed by: INTERNAL MEDICINE

## 2022-06-17 ENCOUNTER — OFFICE VISIT (OUTPATIENT)
Dept: ONCOLOGY | Facility: CLINIC | Age: 52
End: 2022-06-17

## 2022-06-17 ENCOUNTER — LAB (OUTPATIENT)
Dept: LAB | Facility: HOSPITAL | Age: 52
End: 2022-06-17

## 2022-06-17 VITALS
WEIGHT: 174.4 LBS | SYSTOLIC BLOOD PRESSURE: 107 MMHG | DIASTOLIC BLOOD PRESSURE: 72 MMHG | OXYGEN SATURATION: 100 % | RESPIRATION RATE: 16 BRPM | BODY MASS INDEX: 30.9 KG/M2 | HEART RATE: 87 BPM | TEMPERATURE: 97.3 F | HEIGHT: 63 IN

## 2022-06-17 DIAGNOSIS — Z91.89 AT HIGH RISK FOR BREAST CANCER: ICD-10-CM

## 2022-06-17 DIAGNOSIS — Z91.89 AT HIGH RISK FOR BREAST CANCER: Primary | ICD-10-CM

## 2022-06-17 LAB
ALBUMIN SERPL-MCNC: 4.4 G/DL (ref 3.5–5.2)
ALBUMIN/GLOB SERPL: 1.4 G/DL (ref 1.1–2.4)
ALP SERPL-CCNC: 133 U/L (ref 38–116)
ALT SERPL W P-5'-P-CCNC: 19 U/L (ref 0–33)
ANION GAP SERPL CALCULATED.3IONS-SCNC: 10.6 MMOL/L (ref 5–15)
AST SERPL-CCNC: 20 U/L (ref 0–32)
BASOPHILS # BLD AUTO: 0.04 10*3/MM3 (ref 0–0.2)
BASOPHILS NFR BLD AUTO: 0.5 % (ref 0–1.5)
BILIRUB SERPL-MCNC: 0.4 MG/DL (ref 0.2–1.2)
BUN SERPL-MCNC: 18 MG/DL (ref 6–20)
BUN/CREAT SERPL: 18.8 (ref 7.3–30)
CALCIUM SPEC-SCNC: 10.4 MG/DL (ref 8.5–10.2)
CHLORIDE SERPL-SCNC: 101 MMOL/L (ref 98–107)
CO2 SERPL-SCNC: 26.4 MMOL/L (ref 22–29)
CREAT SERPL-MCNC: 0.96 MG/DL (ref 0.6–1.1)
DEPRECATED RDW RBC AUTO: 43.3 FL (ref 37–54)
EGFRCR SERPLBLD CKD-EPI 2021: 71.3 ML/MIN/1.73
EOSINOPHIL # BLD AUTO: 0.19 10*3/MM3 (ref 0–0.4)
EOSINOPHIL NFR BLD AUTO: 2.5 % (ref 0.3–6.2)
ERYTHROCYTE [DISTWIDTH] IN BLOOD BY AUTOMATED COUNT: 12.7 % (ref 12.3–15.4)
GLOBULIN UR ELPH-MCNC: 3.1 GM/DL (ref 1.8–3.5)
GLUCOSE SERPL-MCNC: 90 MG/DL (ref 74–124)
HCT VFR BLD AUTO: 42.8 % (ref 34–46.6)
HGB BLD-MCNC: 13.7 G/DL (ref 12–15.9)
IMM GRANULOCYTES # BLD AUTO: 0.02 10*3/MM3 (ref 0–0.05)
IMM GRANULOCYTES NFR BLD AUTO: 0.3 % (ref 0–0.5)
LYMPHOCYTES # BLD AUTO: 2.36 10*3/MM3 (ref 0.7–3.1)
LYMPHOCYTES NFR BLD AUTO: 31.4 % (ref 19.6–45.3)
MCH RBC QN AUTO: 29.8 PG (ref 26.6–33)
MCHC RBC AUTO-ENTMCNC: 32 G/DL (ref 31.5–35.7)
MCV RBC AUTO: 93 FL (ref 79–97)
MONOCYTES # BLD AUTO: 0.75 10*3/MM3 (ref 0.1–0.9)
MONOCYTES NFR BLD AUTO: 10 % (ref 5–12)
NEUTROPHILS NFR BLD AUTO: 4.15 10*3/MM3 (ref 1.7–7)
NEUTROPHILS NFR BLD AUTO: 55.3 % (ref 42.7–76)
NRBC BLD AUTO-RTO: 0 /100 WBC (ref 0–0.2)
PLATELET # BLD AUTO: 373 10*3/MM3 (ref 140–450)
PMV BLD AUTO: 9 FL (ref 6–12)
POTASSIUM SERPL-SCNC: 4.9 MMOL/L (ref 3.5–4.7)
PROT SERPL-MCNC: 7.5 G/DL (ref 6.3–8)
RBC # BLD AUTO: 4.6 10*6/MM3 (ref 3.77–5.28)
SODIUM SERPL-SCNC: 138 MMOL/L (ref 134–145)
WBC NRBC COR # BLD: 7.51 10*3/MM3 (ref 3.4–10.8)

## 2022-06-17 PROCEDURE — 36415 COLL VENOUS BLD VENIPUNCTURE: CPT

## 2022-06-17 PROCEDURE — 99213 OFFICE O/P EST LOW 20 MIN: CPT

## 2022-06-17 PROCEDURE — 80053 COMPREHEN METABOLIC PANEL: CPT

## 2022-06-17 PROCEDURE — 85025 COMPLETE CBC W/AUTO DIFF WBC: CPT

## 2022-07-04 RX ORDER — HYOSCYAMINE SULFATE 0.125 MG
TABLET ORAL
COMMUNITY
Start: 2022-05-05 | End: 2022-09-12

## 2022-07-04 RX ORDER — OMEPRAZOLE 40 MG/1
CAPSULE, DELAYED RELEASE ORAL
COMMUNITY

## 2022-07-04 RX ORDER — DULOXETIN HYDROCHLORIDE 30 MG/1
1 CAPSULE, DELAYED RELEASE ORAL DAILY
COMMUNITY

## 2022-07-18 ENCOUNTER — ESTABLISHED PATIENT (OUTPATIENT)
Dept: URBAN - METROPOLITAN AREA CLINIC 17 | Facility: CLINIC | Age: 52
End: 2022-07-18

## 2022-07-18 DIAGNOSIS — H52.4: ICD-10-CM

## 2022-07-18 DIAGNOSIS — H43.813: ICD-10-CM

## 2022-07-18 DIAGNOSIS — H01.021: ICD-10-CM

## 2022-07-18 DIAGNOSIS — H04.123: ICD-10-CM

## 2022-07-18 DIAGNOSIS — H52.03: ICD-10-CM

## 2022-07-18 DIAGNOSIS — H01.024: ICD-10-CM

## 2022-07-18 PROCEDURE — 92014 COMPRE OPH EXAM EST PT 1/>: CPT

## 2022-07-18 PROCEDURE — 92015 DETERMINE REFRACTIVE STATE: CPT

## 2022-07-18 ASSESSMENT — KERATOMETRY
OD_K1POWER_DIOPTERS: 43.25
OD_K2POWER_DIOPTERS: 43.75
OS_K2POWER_DIOPTERS: 43.75
OS_AXISANGLE2_DEGREES: 97
OD_AXISANGLE_DEGREES: 16
OS_AXISANGLE_DEGREES: 7
OD_AXISANGLE2_DEGREES: 106
OS_K1POWER_DIOPTERS: 42.75

## 2022-07-18 ASSESSMENT — TONOMETRY
OS_IOP_MMHG: 14
OD_IOP_MMHG: 15

## 2022-07-18 ASSESSMENT — VISUAL ACUITY
OS_CC: 20/20-1
OD_CC: 20/20

## 2022-07-22 PROBLEM — J31.0 CHRONIC RHINITIS: Status: ACTIVE | Noted: 2022-07-22

## 2022-07-22 PROBLEM — G47.10 HYPERSOMNOLENCE: Status: ACTIVE | Noted: 2022-07-22

## 2022-07-22 PROBLEM — N95.9 MENOPAUSAL AND PERIMENOPAUSAL DISORDER: Status: ACTIVE | Noted: 2022-07-22

## 2022-07-22 PROBLEM — E78.5 DYSLIPIDEMIA: Status: ACTIVE | Noted: 2022-07-22

## 2022-07-22 PROBLEM — M79.7 FIBROMYALGIA: Status: ACTIVE | Noted: 2022-07-22

## 2022-07-22 PROBLEM — E03.9 HYPOTHYROIDISM: Status: ACTIVE | Noted: 2022-07-22

## 2022-07-22 PROBLEM — G47.9 SLEEP DISTURBANCE: Status: ACTIVE | Noted: 2022-07-22

## 2022-07-22 PROBLEM — K59.9 FUNCTIONAL DISORDER OF INTESTINE: Status: ACTIVE | Noted: 2022-07-22

## 2022-07-22 PROBLEM — I10 PRIMARY HYPERTENSION: Status: ACTIVE | Noted: 2022-07-22

## 2022-07-22 PROBLEM — M25.569 KNEE PAIN: Status: ACTIVE | Noted: 2022-07-22

## 2022-07-22 PROBLEM — F41.9 ANXIETY: Status: ACTIVE | Noted: 2022-07-22

## 2022-07-22 PROBLEM — E66.9 CLASS 1 OBESITY: Status: ACTIVE | Noted: 2022-07-22

## 2022-07-22 PROBLEM — K21.9 CHRONIC GERD: Status: ACTIVE | Noted: 2022-07-22

## 2022-08-04 DIAGNOSIS — Z00.00 HEALTHCARE MAINTENANCE: Primary | ICD-10-CM

## 2022-08-18 ENCOUNTER — OFFICE VISIT (OUTPATIENT)
Dept: INTERNAL MEDICINE | Age: 52
End: 2022-08-18

## 2022-08-18 VITALS
RESPIRATION RATE: 16 BRPM | HEIGHT: 63 IN | DIASTOLIC BLOOD PRESSURE: 78 MMHG | TEMPERATURE: 96.9 F | OXYGEN SATURATION: 100 % | SYSTOLIC BLOOD PRESSURE: 126 MMHG | WEIGHT: 172 LBS | BODY MASS INDEX: 30.48 KG/M2 | HEART RATE: 85 BPM

## 2022-08-18 DIAGNOSIS — J45.40 MODERATE PERSISTENT ASTHMA, UNSPECIFIED WHETHER COMPLICATED: Chronic | ICD-10-CM

## 2022-08-18 DIAGNOSIS — R73.01 IMPAIRED FASTING BLOOD SUGAR: ICD-10-CM

## 2022-08-18 DIAGNOSIS — J30.9 ALLERGIC RHINITIS, UNSPECIFIED SEASONALITY, UNSPECIFIED TRIGGER: ICD-10-CM

## 2022-08-18 DIAGNOSIS — Z91.89 AT HIGH RISK FOR BREAST CANCER: ICD-10-CM

## 2022-08-18 DIAGNOSIS — E87.5 HYPERKALEMIA: Primary | ICD-10-CM

## 2022-08-18 DIAGNOSIS — K58.2 IRRITABLE BOWEL SYNDROME WITH BOTH CONSTIPATION AND DIARRHEA: Chronic | ICD-10-CM

## 2022-08-18 DIAGNOSIS — L70.9 ACNE, UNSPECIFIED ACNE TYPE: Chronic | ICD-10-CM

## 2022-08-18 DIAGNOSIS — M54.12 CERVICAL RADICULOPATHY AT C7: ICD-10-CM

## 2022-08-18 DIAGNOSIS — E83.52 HYPERCALCEMIA: ICD-10-CM

## 2022-08-18 DIAGNOSIS — F41.1 GENERALIZED ANXIETY DISORDER: Chronic | ICD-10-CM

## 2022-08-18 DIAGNOSIS — E78.5 HYPERLIPIDEMIA, UNSPECIFIED HYPERLIPIDEMIA TYPE: ICD-10-CM

## 2022-08-18 PROCEDURE — 99214 OFFICE O/P EST MOD 30 MIN: CPT | Performed by: NURSE PRACTITIONER

## 2022-08-18 PROCEDURE — 93000 ELECTROCARDIOGRAM COMPLETE: CPT | Performed by: NURSE PRACTITIONER

## 2022-08-18 RX ORDER — CALCIUM POLYCARBOPHIL 625 MG
TABLET ORAL
COMMUNITY
Start: 2022-07-01

## 2022-08-18 RX ORDER — ALBUTEROL SULFATE 90 UG/1
2 AEROSOL, METERED RESPIRATORY (INHALATION) EVERY 4 HOURS PRN
Qty: 6.7 G | Refills: 3 | Status: SHIPPED | OUTPATIENT
Start: 2022-08-18

## 2022-08-18 RX ORDER — AZELASTINE 1 MG/ML
2 SPRAY, METERED NASAL DAILY
Qty: 30 ML | Refills: 0 | Status: SHIPPED | OUTPATIENT
Start: 2022-08-18 | End: 2022-10-05

## 2022-08-18 RX ORDER — VIT C/B6/B5/MAGNESIUM/HERB 173 50-5-6-5MG
CAPSULE ORAL
COMMUNITY
Start: 2022-05-01

## 2022-08-19 LAB
BUN SERPL-MCNC: 16 MG/DL (ref 6–24)
BUN/CREAT SERPL: 18 (ref 9–23)
CALCIUM SERPL-MCNC: 10 MG/DL (ref 8.7–10.2)
CHLORIDE SERPL-SCNC: 101 MMOL/L (ref 96–106)
CO2 SERPL-SCNC: 26 MMOL/L (ref 20–29)
CREAT SERPL-MCNC: 0.91 MG/DL (ref 0.57–1)
EGFRCR-CYS SERPLBLD CKD-EPI 2021: 76 ML/MIN/1.73
GLUCOSE SERPL-MCNC: 98 MG/DL (ref 65–99)
POTASSIUM SERPL-SCNC: 4.5 MMOL/L (ref 3.5–5.2)
PTH-INTACT SERPL-MCNC: 29 PG/ML (ref 15–65)
SODIUM SERPL-SCNC: 142 MMOL/L (ref 134–144)

## 2022-09-20 ENCOUNTER — OFFICE VISIT (OUTPATIENT)
Dept: OBSTETRICS AND GYNECOLOGY | Facility: CLINIC | Age: 52
End: 2022-09-20

## 2022-09-20 VITALS
SYSTOLIC BLOOD PRESSURE: 120 MMHG | BODY MASS INDEX: 30.3 KG/M2 | DIASTOLIC BLOOD PRESSURE: 70 MMHG | WEIGHT: 171 LBS | HEIGHT: 63 IN

## 2022-09-20 DIAGNOSIS — Z91.89 AT HIGH RISK FOR BREAST CANCER: ICD-10-CM

## 2022-09-20 DIAGNOSIS — R22.32 AXILLARY MASS, LEFT: ICD-10-CM

## 2022-09-20 DIAGNOSIS — Z00.00 ANNUAL PHYSICAL EXAM: Primary | ICD-10-CM

## 2022-09-20 PROCEDURE — 99212 OFFICE O/P EST SF 10 MIN: CPT | Performed by: OBSTETRICS & GYNECOLOGY

## 2022-09-20 PROCEDURE — 99396 PREV VISIT EST AGE 40-64: CPT | Performed by: OBSTETRICS & GYNECOLOGY

## 2022-10-03 ENCOUNTER — HOSPITAL ENCOUNTER (OUTPATIENT)
Dept: MRI IMAGING | Facility: HOSPITAL | Age: 52
Discharge: HOME | End: 2022-10-03

## 2022-10-03 DIAGNOSIS — Z91.89 AT HIGH RISK FOR BREAST CANCER: ICD-10-CM

## 2022-10-03 PROCEDURE — A9577 INJ MULTIHANCE: HCPCS | Performed by: INTERNAL MEDICINE

## 2022-10-03 PROCEDURE — 77049 MRI BREAST C-+ W/CAD BI: CPT

## 2022-10-03 PROCEDURE — 0 GADOBENATE DIMEGLUMINE 529 MG/ML SOLUTION: Performed by: INTERNAL MEDICINE

## 2022-10-03 RX ADMIN — GADOBENATE DIMEGLUMINE 16 ML: 529 INJECTION, SOLUTION INTRAVENOUS at 12:28

## 2022-10-04 RX ORDER — AZELASTINE 1 MG/ML
SPRAY, METERED NASAL
Qty: 30 ML | Refills: 0 | Status: CANCELLED | OUTPATIENT
Start: 2022-10-04

## 2022-10-05 RX ORDER — AZELASTINE 1 MG/ML
SPRAY, METERED NASAL
Qty: 30 ML | Refills: 1 | Status: SHIPPED | OUTPATIENT
Start: 2022-10-05

## 2022-10-17 ENCOUNTER — APPOINTMENT (OUTPATIENT)
Dept: LAB | Facility: HOSPITAL | Age: 52
End: 2022-10-17

## 2022-10-18 ENCOUNTER — OFFICE VISIT (OUTPATIENT)
Dept: ONCOLOGY | Facility: CLINIC | Age: 52
End: 2022-10-18

## 2022-10-18 ENCOUNTER — APPOINTMENT (OUTPATIENT)
Dept: LAB | Facility: HOSPITAL | Age: 52
End: 2022-10-18

## 2022-10-18 VITALS
RESPIRATION RATE: 18 BRPM | DIASTOLIC BLOOD PRESSURE: 78 MMHG | WEIGHT: 176.2 LBS | BODY MASS INDEX: 31.22 KG/M2 | HEIGHT: 63 IN | OXYGEN SATURATION: 97 % | SYSTOLIC BLOOD PRESSURE: 120 MMHG | TEMPERATURE: 97.7 F | HEART RATE: 90 BPM

## 2022-10-18 DIAGNOSIS — Z91.89 AT HIGH RISK FOR BREAST CANCER: Primary | ICD-10-CM

## 2022-10-18 PROCEDURE — 99214 OFFICE O/P EST MOD 30 MIN: CPT | Performed by: INTERNAL MEDICINE

## 2022-10-20 ENCOUNTER — OFFICE VISIT (OUTPATIENT)
Dept: SURGERY | Facility: CLINIC | Age: 52
End: 2022-10-20

## 2022-10-20 VITALS
BODY MASS INDEX: 30.12 KG/M2 | SYSTOLIC BLOOD PRESSURE: 128 MMHG | WEIGHT: 170 LBS | HEIGHT: 63 IN | HEART RATE: 87 BPM | DIASTOLIC BLOOD PRESSURE: 70 MMHG | OXYGEN SATURATION: 98 %

## 2022-10-20 DIAGNOSIS — Z91.89 AT HIGH RISK FOR BREAST CANCER: ICD-10-CM

## 2022-10-20 DIAGNOSIS — N64.4 BREAST PAIN: ICD-10-CM

## 2022-10-20 DIAGNOSIS — Q83.1 ACCESSORY BREAST TISSUE OF AXILLA: Primary | ICD-10-CM

## 2022-10-20 PROCEDURE — 99214 OFFICE O/P EST MOD 30 MIN: CPT | Performed by: NURSE PRACTITIONER

## 2022-10-28 ENCOUNTER — CLINICAL SUPPORT (OUTPATIENT)
Dept: INTERNAL MEDICINE | Age: 52
End: 2022-10-28

## 2022-10-28 ENCOUNTER — FLU SHOT (OUTPATIENT)
Dept: INTERNAL MEDICINE | Age: 52
End: 2022-10-28

## 2022-10-28 DIAGNOSIS — Z23 NEED FOR SHINGLES VACCINE: Primary | ICD-10-CM

## 2022-10-28 DIAGNOSIS — Z23 NEED FOR INFLUENZA VACCINATION: Primary | ICD-10-CM

## 2022-10-28 PROCEDURE — 90686 IIV4 VACC NO PRSV 0.5 ML IM: CPT | Performed by: NURSE PRACTITIONER

## 2022-10-28 PROCEDURE — 90471 IMMUNIZATION ADMIN: CPT | Performed by: NURSE PRACTITIONER

## 2022-10-28 PROCEDURE — 90750 HZV VACC RECOMBINANT IM: CPT | Performed by: NURSE PRACTITIONER

## 2023-01-11 ENCOUNTER — TELEPHONE (OUTPATIENT)
Dept: SURGERY | Facility: CLINIC | Age: 53
End: 2023-01-11
Payer: COMMERCIAL

## 2023-01-23 RX ORDER — ESCITALOPRAM OXALATE 10 MG/1
TABLET ORAL
Qty: 135 TABLET | Refills: 1 | Status: SHIPPED | OUTPATIENT
Start: 2023-01-23

## 2023-05-08 RX ORDER — ESCITALOPRAM OXALATE 10 MG/1
TABLET ORAL
Qty: 135 TABLET | Refills: 1 | Status: SHIPPED | OUTPATIENT
Start: 2023-05-08

## 2023-05-12 ENCOUNTER — HOSPITAL ENCOUNTER (OUTPATIENT)
Dept: MAMMOGRAPHY | Facility: HOSPITAL | Age: 53
Discharge: HOME | End: 2023-05-12
Admitting: INTERNAL MEDICINE
Payer: COMMERCIAL

## 2023-05-12 DIAGNOSIS — Z91.89 AT HIGH RISK FOR BREAST CANCER: ICD-10-CM

## 2023-05-12 PROCEDURE — 77063 BREAST TOMOSYNTHESIS BI: CPT

## 2023-05-12 PROCEDURE — 77067 SCR MAMMO BI INCL CAD: CPT

## 2023-05-17 ENCOUNTER — TELEPHONE (OUTPATIENT)
Dept: ONCOLOGY | Facility: CLINIC | Age: 53
End: 2023-05-17
Payer: COMMERCIAL

## 2023-05-18 ENCOUNTER — OFFICE VISIT (OUTPATIENT)
Dept: ONCOLOGY | Facility: CLINIC | Age: 53
End: 2023-05-18
Payer: COMMERCIAL

## 2023-05-18 ENCOUNTER — LAB (OUTPATIENT)
Dept: LAB | Facility: HOSPITAL | Age: 53
End: 2023-05-18
Payer: COMMERCIAL

## 2023-05-18 VITALS
WEIGHT: 177.5 LBS | HEIGHT: 63 IN | TEMPERATURE: 98.2 F | RESPIRATION RATE: 16 BRPM | SYSTOLIC BLOOD PRESSURE: 111 MMHG | HEART RATE: 91 BPM | BODY MASS INDEX: 31.45 KG/M2 | DIASTOLIC BLOOD PRESSURE: 77 MMHG | OXYGEN SATURATION: 99 %

## 2023-05-18 DIAGNOSIS — Z91.89 AT HIGH RISK FOR BREAST CANCER: Primary | ICD-10-CM

## 2023-05-18 RX ORDER — MELATONIN
1000 DAILY
COMMUNITY
Start: 2023-01-01

## 2023-05-30 ENCOUNTER — ESTABLISHED PATIENT (OUTPATIENT)
Dept: URBAN - METROPOLITAN AREA CLINIC 17 | Facility: CLINIC | Age: 53
End: 2023-05-30

## 2023-05-30 DIAGNOSIS — H52.4: ICD-10-CM

## 2023-05-30 PROCEDURE — 92014 COMPRE OPH EXAM EST PT 1/>: CPT

## 2023-05-30 PROCEDURE — 92015 DETERMINE REFRACTIVE STATE: CPT

## 2023-05-30 ASSESSMENT — TONOMETRY
OS_IOP_MMHG: 20
OD_IOP_MMHG: 20

## 2023-05-30 ASSESSMENT — KERATOMETRY
OS_AXISANGLE_DEGREES: 7
OS_K1POWER_DIOPTERS: 42.75
OS_K2POWER_DIOPTERS: 43.75
OS_AXISANGLE2_DEGREES: 97
OD_K1POWER_DIOPTERS: 43.25
OD_AXISANGLE_DEGREES: 16
OD_K2POWER_DIOPTERS: 43.75
OD_AXISANGLE2_DEGREES: 106

## 2023-05-30 ASSESSMENT — VISUAL ACUITY
OS_CC: 20/20-1
OD_CC: 20/20-2

## 2023-10-04 ENCOUNTER — HOSPITAL ENCOUNTER (OUTPATIENT)
Dept: MRI IMAGING | Facility: HOSPITAL | Age: 53
Discharge: HOME | End: 2023-10-04
Admitting: INTERNAL MEDICINE
Payer: COMMERCIAL

## 2023-10-04 DIAGNOSIS — Z91.89 AT HIGH RISK FOR BREAST CANCER: ICD-10-CM

## 2023-10-04 PROCEDURE — 0 GADOBENATE DIMEGLUMINE 529 MG/ML SOLUTION: Performed by: INTERNAL MEDICINE

## 2023-10-04 PROCEDURE — 77049 MRI BREAST C-+ W/CAD BI: CPT

## 2023-10-04 PROCEDURE — A9577 INJ MULTIHANCE: HCPCS | Performed by: INTERNAL MEDICINE

## 2023-10-04 RX ADMIN — GADOBENATE DIMEGLUMINE 16 ML: 529 INJECTION, SOLUTION INTRAVENOUS at 10:29

## 2023-10-09 ENCOUNTER — TELEPHONE (OUTPATIENT)
Dept: ONCOLOGY | Facility: CLINIC | Age: 53
End: 2023-10-09
Payer: COMMERCIAL

## 2023-10-24 ENCOUNTER — OFFICE VISIT (OUTPATIENT)
Dept: OBSTETRICS AND GYNECOLOGY | Facility: CLINIC | Age: 53
End: 2023-10-24
Payer: COMMERCIAL

## 2023-10-24 VITALS
BODY MASS INDEX: 31.11 KG/M2 | SYSTOLIC BLOOD PRESSURE: 126 MMHG | WEIGHT: 175.6 LBS | HEIGHT: 63 IN | DIASTOLIC BLOOD PRESSURE: 84 MMHG

## 2023-10-24 DIAGNOSIS — Z00.00 ANNUAL PHYSICAL EXAM: Primary | ICD-10-CM

## 2023-10-25 ENCOUNTER — LAB (OUTPATIENT)
Dept: LAB | Facility: HOSPITAL | Age: 53
End: 2023-10-25
Payer: COMMERCIAL

## 2023-10-25 ENCOUNTER — OFFICE VISIT (OUTPATIENT)
Dept: ONCOLOGY | Facility: CLINIC | Age: 53
End: 2023-10-25
Payer: COMMERCIAL

## 2023-10-25 VITALS
TEMPERATURE: 98.2 F | DIASTOLIC BLOOD PRESSURE: 79 MMHG | RESPIRATION RATE: 16 BRPM | BODY MASS INDEX: 31.01 KG/M2 | HEART RATE: 76 BPM | OXYGEN SATURATION: 100 % | SYSTOLIC BLOOD PRESSURE: 111 MMHG | HEIGHT: 63 IN | WEIGHT: 175 LBS

## 2023-10-25 DIAGNOSIS — Z91.89 AT HIGH RISK FOR BREAST CANCER: Primary | ICD-10-CM

## 2023-10-25 DIAGNOSIS — Z12.31 SCREENING MAMMOGRAM, ENCOUNTER FOR: ICD-10-CM

## 2023-10-25 DIAGNOSIS — Z91.89 AT HIGH RISK FOR BREAST CANCER: ICD-10-CM

## 2023-10-25 LAB
BASOPHILS # BLD AUTO: 0.05 10*3/MM3 (ref 0–0.2)
BASOPHILS NFR BLD AUTO: 0.6 % (ref 0–1.5)
DEPRECATED RDW RBC AUTO: 41.5 FL (ref 37–54)
EOSINOPHIL # BLD AUTO: 0.16 10*3/MM3 (ref 0–0.4)
EOSINOPHIL NFR BLD AUTO: 1.9 % (ref 0.3–6.2)
ERYTHROCYTE [DISTWIDTH] IN BLOOD BY AUTOMATED COUNT: 12.1 % (ref 12.3–15.4)
HCT VFR BLD AUTO: 43.8 % (ref 34–46.6)
HGB BLD-MCNC: 14.4 G/DL (ref 12–15.9)
IMM GRANULOCYTES # BLD AUTO: 0.02 10*3/MM3 (ref 0–0.05)
IMM GRANULOCYTES NFR BLD AUTO: 0.2 % (ref 0–0.5)
LYMPHOCYTES # BLD AUTO: 2.78 10*3/MM3 (ref 0.7–3.1)
LYMPHOCYTES NFR BLD AUTO: 32.8 % (ref 19.6–45.3)
MCH RBC QN AUTO: 30.4 PG (ref 26.6–33)
MCHC RBC AUTO-ENTMCNC: 32.9 G/DL (ref 31.5–35.7)
MCV RBC AUTO: 92.6 FL (ref 79–97)
MONOCYTES # BLD AUTO: 0.7 10*3/MM3 (ref 0.1–0.9)
MONOCYTES NFR BLD AUTO: 8.3 % (ref 5–12)
NEUTROPHILS NFR BLD AUTO: 4.77 10*3/MM3 (ref 1.7–7)
NEUTROPHILS NFR BLD AUTO: 56.2 % (ref 42.7–76)
NRBC BLD AUTO-RTO: 0 /100 WBC (ref 0–0.2)
PLATELET # BLD AUTO: 367 10*3/MM3 (ref 140–450)
PMV BLD AUTO: 9.3 FL (ref 6–12)
RBC # BLD AUTO: 4.73 10*6/MM3 (ref 3.77–5.28)
WBC NRBC COR # BLD: 8.48 10*3/MM3 (ref 3.4–10.8)

## 2023-10-25 PROCEDURE — 36415 COLL VENOUS BLD VENIPUNCTURE: CPT

## 2023-10-25 PROCEDURE — 85025 COMPLETE CBC W/AUTO DIFF WBC: CPT

## 2023-11-06 ENCOUNTER — HOSPITAL ENCOUNTER (EMERGENCY)
Facility: HOSPITAL | Age: 53
Discharge: HOME | End: 2023-11-06
Attending: EMERGENCY MEDICINE | Admitting: EMERGENCY MEDICINE
Payer: COMMERCIAL

## 2023-11-06 VITALS
RESPIRATION RATE: 18 BRPM | DIASTOLIC BLOOD PRESSURE: 78 MMHG | SYSTOLIC BLOOD PRESSURE: 128 MMHG | HEART RATE: 78 BPM | HEIGHT: 63 IN | BODY MASS INDEX: 31.02 KG/M2 | WEIGHT: 175.04 LBS | TEMPERATURE: 98.6 F | OXYGEN SATURATION: 97 %

## 2023-11-06 DIAGNOSIS — R00.2 PALPITATIONS: ICD-10-CM

## 2023-11-06 DIAGNOSIS — F41.9 ANXIETY: Primary | ICD-10-CM

## 2023-11-06 LAB
ALBUMIN SERPL-MCNC: 4.1 G/DL (ref 3.5–5.2)
ALBUMIN/GLOB SERPL: 1.3 G/DL
ALP SERPL-CCNC: 100 U/L (ref 39–117)
ALT SERPL W P-5'-P-CCNC: 13 U/L (ref 1–33)
AMPHET+METHAMPHET UR QL: NEGATIVE
ANION GAP SERPL CALCULATED.3IONS-SCNC: 12.7 MMOL/L (ref 5–15)
AST SERPL-CCNC: 30 U/L (ref 1–32)
BACTERIA UR QL AUTO: ABNORMAL /HPF
BARBITURATES UR QL SCN: NEGATIVE
BASOPHILS # BLD AUTO: 0.03 10*3/MM3 (ref 0–0.2)
BASOPHILS NFR BLD AUTO: 0.4 % (ref 0–1.5)
BENZODIAZ UR QL SCN: NEGATIVE
BILIRUB SERPL-MCNC: 0.5 MG/DL (ref 0–1.2)
BILIRUB UR QL STRIP: NEGATIVE
BUN SERPL-MCNC: 13 MG/DL (ref 6–20)
BUN/CREAT SERPL: 16 (ref 7–25)
CALCIUM SPEC-SCNC: 9.7 MG/DL (ref 8.6–10.5)
CANNABINOIDS SERPL QL: NEGATIVE
CHLORIDE SERPL-SCNC: 104 MMOL/L (ref 98–107)
CLARITY UR: ABNORMAL
CO2 SERPL-SCNC: 20.3 MMOL/L (ref 22–29)
COCAINE UR QL: NEGATIVE
COLOR UR: YELLOW
CREAT SERPL-MCNC: 0.81 MG/DL (ref 0.57–1)
DEPRECATED RDW RBC AUTO: 37.8 FL (ref 37–54)
EGFRCR SERPLBLD CKD-EPI 2021: 86.9 ML/MIN/1.73
EOSINOPHIL # BLD AUTO: 0.02 10*3/MM3 (ref 0–0.4)
EOSINOPHIL NFR BLD AUTO: 0.2 % (ref 0.3–6.2)
ERYTHROCYTE [DISTWIDTH] IN BLOOD BY AUTOMATED COUNT: 11.8 % (ref 12.3–15.4)
ETHANOL BLD-MCNC: <10 MG/DL (ref 0–10)
ETHANOL UR QL: <0.01 %
FENTANYL UR-MCNC: NEGATIVE NG/ML
FLUAV SUBTYP SPEC NAA+PROBE: NOT DETECTED
FLUBV RNA ISLT QL NAA+PROBE: NOT DETECTED
GLOBULIN UR ELPH-MCNC: 3.2 GM/DL
GLUCOSE SERPL-MCNC: 88 MG/DL (ref 65–99)
GLUCOSE UR STRIP-MCNC: NEGATIVE MG/DL
HCT VFR BLD AUTO: 43.2 % (ref 34–46.6)
HGB BLD-MCNC: 14.5 G/DL (ref 12–15.9)
HGB UR QL STRIP.AUTO: ABNORMAL
HYALINE CASTS UR QL AUTO: ABNORMAL /LPF
IMM GRANULOCYTES # BLD AUTO: 0.03 10*3/MM3 (ref 0–0.05)
IMM GRANULOCYTES NFR BLD AUTO: 0.4 % (ref 0–0.5)
KETONES UR QL STRIP: ABNORMAL
LEUKOCYTE ESTERASE UR QL STRIP.AUTO: ABNORMAL
LYMPHOCYTES # BLD AUTO: 1.79 10*3/MM3 (ref 0.7–3.1)
LYMPHOCYTES NFR BLD AUTO: 21.3 % (ref 19.6–45.3)
MCH RBC QN AUTO: 29.7 PG (ref 26.6–33)
MCHC RBC AUTO-ENTMCNC: 33.6 G/DL (ref 31.5–35.7)
MCV RBC AUTO: 88.3 FL (ref 79–97)
METHADONE UR QL SCN: NEGATIVE
MONOCYTES # BLD AUTO: 0.56 10*3/MM3 (ref 0.1–0.9)
MONOCYTES NFR BLD AUTO: 6.7 % (ref 5–12)
NEUTROPHILS NFR BLD AUTO: 5.97 10*3/MM3 (ref 1.7–7)
NEUTROPHILS NFR BLD AUTO: 71 % (ref 42.7–76)
NITRITE UR QL STRIP: NEGATIVE
NRBC BLD AUTO-RTO: 0 /100 WBC (ref 0–0.2)
OPIATES UR QL: NEGATIVE
OXYCODONE UR QL SCN: NEGATIVE
PH UR STRIP.AUTO: 5.5 [PH] (ref 5–8)
PLATELET # BLD AUTO: 362 10*3/MM3 (ref 140–450)
PMV BLD AUTO: 8.9 FL (ref 6–12)
POTASSIUM SERPL-SCNC: 4.6 MMOL/L (ref 3.5–5.2)
PROT SERPL-MCNC: 7.3 G/DL (ref 6–8.5)
PROT UR QL STRIP: NEGATIVE
RBC # BLD AUTO: 4.89 10*6/MM3 (ref 3.77–5.28)
RBC # UR STRIP: ABNORMAL /HPF
REF LAB TEST METHOD: ABNORMAL
SARS-COV-2 RNA RESP QL NAA+PROBE: NOT DETECTED
SODIUM SERPL-SCNC: 137 MMOL/L (ref 136–145)
SP GR UR STRIP: 1.02 (ref 1–1.03)
SQUAMOUS #/AREA URNS HPF: ABNORMAL /HPF
UROBILINOGEN UR QL STRIP: ABNORMAL
WBC # UR STRIP: ABNORMAL /HPF
WBC NRBC COR # BLD: 8.4 10*3/MM3 (ref 3.4–10.8)

## 2023-11-06 PROCEDURE — 80307 DRUG TEST PRSMV CHEM ANLYZR: CPT | Performed by: EMERGENCY MEDICINE

## 2023-11-06 PROCEDURE — 85025 COMPLETE CBC W/AUTO DIFF WBC: CPT | Performed by: PHYSICIAN ASSISTANT

## 2023-11-06 PROCEDURE — 87636 SARSCOV2 & INF A&B AMP PRB: CPT | Performed by: PHYSICIAN ASSISTANT

## 2023-11-06 PROCEDURE — 81001 URINALYSIS AUTO W/SCOPE: CPT | Performed by: PHYSICIAN ASSISTANT

## 2023-11-06 PROCEDURE — 90791 PSYCH DIAGNOSTIC EVALUATION: CPT

## 2023-11-06 PROCEDURE — 80053 COMPREHEN METABOLIC PANEL: CPT | Performed by: PHYSICIAN ASSISTANT

## 2023-11-06 PROCEDURE — 99283 EMERGENCY DEPT VISIT LOW MDM: CPT

## 2023-11-06 PROCEDURE — 25810000003 LACTATED RINGERS SOLUTION: Performed by: EMERGENCY MEDICINE

## 2023-11-06 PROCEDURE — 82077 ASSAY SPEC XCP UR&BREATH IA: CPT | Performed by: EMERGENCY MEDICINE

## 2023-11-06 RX ORDER — HYDROXYZINE HYDROCHLORIDE 25 MG/1
25 TABLET, FILM COATED ORAL 3 TIMES DAILY PRN
Qty: 20 TABLET | Refills: 0 | Status: SHIPPED | OUTPATIENT
Start: 2023-11-06 | End: 2023-11-09 | Stop reason: SDUPTHER

## 2023-11-06 RX ORDER — SODIUM CHLORIDE 0.9 % (FLUSH) 0.9 %
10 SYRINGE (ML) INJECTION AS NEEDED
Status: DISCONTINUED | OUTPATIENT
Start: 2023-11-06 | End: 2023-11-06 | Stop reason: HOSPADM

## 2023-11-06 RX ORDER — DIAZEPAM 2 MG/1
4 TABLET ORAL ONCE
Status: COMPLETED | OUTPATIENT
Start: 2023-11-06 | End: 2023-11-06

## 2023-11-06 RX ADMIN — SODIUM CHLORIDE, POTASSIUM CHLORIDE, SODIUM LACTATE AND CALCIUM CHLORIDE 1000 ML: 600; 310; 30; 20 INJECTION, SOLUTION INTRAVENOUS at 09:24

## 2023-11-06 RX ADMIN — DIAZEPAM 4 MG: 2 TABLET ORAL at 08:26

## 2023-11-09 ENCOUNTER — OFFICE VISIT (OUTPATIENT)
Dept: INTERNAL MEDICINE | Age: 53
End: 2023-11-09
Payer: COMMERCIAL

## 2023-11-09 VITALS
OXYGEN SATURATION: 98 % | TEMPERATURE: 97.6 F | BODY MASS INDEX: 30.65 KG/M2 | HEIGHT: 63 IN | DIASTOLIC BLOOD PRESSURE: 82 MMHG | HEART RATE: 99 BPM | SYSTOLIC BLOOD PRESSURE: 124 MMHG | WEIGHT: 173 LBS

## 2023-11-09 DIAGNOSIS — F41.9 ANXIETY: Primary | ICD-10-CM

## 2023-11-09 DIAGNOSIS — G47.00 INSOMNIA, UNSPECIFIED TYPE: ICD-10-CM

## 2023-11-09 DIAGNOSIS — Z23 FLU VACCINE NEED: ICD-10-CM

## 2023-11-09 RX ORDER — HYDROXYZINE HYDROCHLORIDE 25 MG/1
25 TABLET, FILM COATED ORAL 3 TIMES DAILY PRN
Qty: 30 TABLET | Refills: 0 | Status: SHIPPED | OUTPATIENT
Start: 2023-11-09

## 2024-04-16 ENCOUNTER — ESTABLISHED PATIENT (OUTPATIENT)
Dept: URBAN - METROPOLITAN AREA CLINIC 17 | Facility: CLINIC | Age: 54
End: 2024-04-16

## 2024-04-16 DIAGNOSIS — Z79.899: ICD-10-CM

## 2024-04-16 DIAGNOSIS — H52.4: ICD-10-CM

## 2024-04-16 PROCEDURE — 92134 CPTRZ OPH DX IMG PST SGM RTA: CPT

## 2024-04-16 PROCEDURE — 92015 DETERMINE REFRACTIVE STATE: CPT

## 2024-04-16 PROCEDURE — 92014 COMPRE OPH EXAM EST PT 1/>: CPT

## 2024-04-16 ASSESSMENT — KERATOMETRY
OS_AXISANGLE_DEGREES: 7
OD_K2POWER_DIOPTERS: 43.75
OS_AXISANGLE2_DEGREES: 97
OD_K1POWER_DIOPTERS: 43.25
OD_AXISANGLE2_DEGREES: 106
OS_K2POWER_DIOPTERS: 43.75
OD_AXISANGLE_DEGREES: 16
OS_K1POWER_DIOPTERS: 42.75

## 2024-04-16 ASSESSMENT — VISUAL ACUITY
OS_CC: 20/25-1
OD_CC: 20/20-1

## 2024-04-16 ASSESSMENT — TONOMETRY
OS_IOP_MMHG: 15
OD_IOP_MMHG: 16

## 2024-04-23 ENCOUNTER — OFFICE VISIT (OUTPATIENT)
Dept: INTERNAL MEDICINE | Age: 54
End: 2024-04-23
Payer: COMMERCIAL

## 2024-04-23 VITALS
BODY MASS INDEX: 30.51 KG/M2 | WEIGHT: 172.2 LBS | TEMPERATURE: 98.1 F | DIASTOLIC BLOOD PRESSURE: 68 MMHG | SYSTOLIC BLOOD PRESSURE: 118 MMHG | OXYGEN SATURATION: 99 % | HEART RATE: 103 BPM | HEIGHT: 63 IN

## 2024-04-23 DIAGNOSIS — F41.1 GENERALIZED ANXIETY DISORDER: Chronic | ICD-10-CM

## 2024-04-23 DIAGNOSIS — Z00.00 ENCOUNTER FOR ANNUAL PHYSICAL EXAM: Primary | ICD-10-CM

## 2024-04-23 DIAGNOSIS — R09.81 SINUS CONGESTION: ICD-10-CM

## 2024-04-23 DIAGNOSIS — Z23 NEED FOR VACCINATION: ICD-10-CM

## 2024-04-23 PROCEDURE — 90677 PCV20 VACCINE IM: CPT | Performed by: NURSE PRACTITIONER

## 2024-04-23 PROCEDURE — 99396 PREV VISIT EST AGE 40-64: CPT | Performed by: NURSE PRACTITIONER

## 2024-04-23 PROCEDURE — 90471 IMMUNIZATION ADMIN: CPT | Performed by: NURSE PRACTITIONER

## 2024-04-23 RX ORDER — AZELASTINE 1 MG/ML
2 SPRAY, METERED NASAL DAILY
Qty: 30 ML | Refills: 3 | Status: SHIPPED | OUTPATIENT
Start: 2024-04-23

## 2024-04-23 RX ORDER — ESCITALOPRAM OXALATE 10 MG/1
15 TABLET ORAL DAILY
Qty: 135 TABLET | Refills: 1 | Status: SHIPPED | OUTPATIENT
Start: 2024-04-23

## 2024-04-24 DIAGNOSIS — E87.5 HYPERKALEMIA: Primary | ICD-10-CM

## 2024-04-24 LAB
ALBUMIN SERPL-MCNC: 4.6 G/DL (ref 3.5–5.2)
ALBUMIN/GLOB SERPL: 1.9 G/DL
ALP SERPL-CCNC: 116 U/L (ref 39–117)
ALT SERPL-CCNC: 21 U/L (ref 1–33)
APPEARANCE UR: CLEAR
AST SERPL-CCNC: 19 U/L (ref 1–32)
BACTERIA #/AREA URNS HPF: NORMAL /[HPF]
BASOPHILS # BLD AUTO: 0.04 10*3/MM3 (ref 0–0.2)
BASOPHILS NFR BLD AUTO: 0.5 % (ref 0–1.5)
BILIRUB SERPL-MCNC: 0.4 MG/DL (ref 0–1.2)
BILIRUB UR QL STRIP: NEGATIVE
BUN SERPL-MCNC: 18 MG/DL (ref 6–20)
BUN/CREAT SERPL: 17.5 (ref 7–25)
CALCIUM SERPL-MCNC: 10.5 MG/DL (ref 8.6–10.5)
CASTS URNS QL MICRO: NORMAL /LPF
CHLORIDE SERPL-SCNC: 103 MMOL/L (ref 98–107)
CHOLEST SERPL-MCNC: 245 MG/DL (ref 0–200)
CHOLEST/HDLC SERPL: 3.06 {RATIO}
CO2 SERPL-SCNC: 27.6 MMOL/L (ref 22–29)
COLOR UR: YELLOW
CREAT SERPL-MCNC: 1.03 MG/DL (ref 0.57–1)
EGFRCR SERPLBLD CKD-EPI 2021: 64.7 ML/MIN/1.73
EOSINOPHIL # BLD AUTO: 0.15 10*3/MM3 (ref 0–0.4)
EOSINOPHIL NFR BLD AUTO: 1.9 % (ref 0.3–6.2)
EPI CELLS #/AREA URNS HPF: NORMAL /HPF (ref 0–10)
ERYTHROCYTE [DISTWIDTH] IN BLOOD BY AUTOMATED COUNT: 12.2 % (ref 12.3–15.4)
GLOBULIN SER CALC-MCNC: 2.4 GM/DL
GLUCOSE SERPL-MCNC: 89 MG/DL (ref 65–99)
GLUCOSE UR QL STRIP: NEGATIVE
HBA1C MFR BLD: 5.5 % (ref 4.8–5.6)
HCT VFR BLD AUTO: 41.8 % (ref 34–46.6)
HDLC SERPL-MCNC: 80 MG/DL (ref 40–60)
HGB BLD-MCNC: 13.9 G/DL (ref 12–15.9)
HGB UR QL STRIP: ABNORMAL
IMM GRANULOCYTES # BLD AUTO: 0.02 10*3/MM3 (ref 0–0.05)
IMM GRANULOCYTES NFR BLD AUTO: 0.3 % (ref 0–0.5)
KETONES UR QL STRIP: NEGATIVE
LDLC SERPL CALC-MCNC: 137 MG/DL (ref 0–100)
LEUKOCYTE ESTERASE UR QL STRIP: NEGATIVE
LYMPHOCYTES # BLD AUTO: 2.27 10*3/MM3 (ref 0.7–3.1)
LYMPHOCYTES NFR BLD AUTO: 28.6 % (ref 19.6–45.3)
MCH RBC QN AUTO: 30.1 PG (ref 26.6–33)
MCHC RBC AUTO-ENTMCNC: 33.3 G/DL (ref 31.5–35.7)
MCV RBC AUTO: 90.5 FL (ref 79–97)
MICRO URNS: ABNORMAL
MONOCYTES # BLD AUTO: 0.66 10*3/MM3 (ref 0.1–0.9)
MONOCYTES NFR BLD AUTO: 8.3 % (ref 5–12)
NEUTROPHILS # BLD AUTO: 4.79 10*3/MM3 (ref 1.7–7)
NEUTROPHILS NFR BLD AUTO: 60.4 % (ref 42.7–76)
NITRITE UR QL STRIP: NEGATIVE
NRBC BLD AUTO-RTO: 0 /100 WBC (ref 0–0.2)
PH UR STRIP: 5.5 [PH] (ref 5–7.5)
PLATELET # BLD AUTO: 387 10*3/MM3 (ref 140–450)
POTASSIUM SERPL-SCNC: 5.4 MMOL/L (ref 3.5–5.2)
PROT SERPL-MCNC: 7 G/DL (ref 6–8.5)
PROT UR QL STRIP: NEGATIVE
RBC # BLD AUTO: 4.62 10*6/MM3 (ref 3.77–5.28)
RBC #/AREA URNS HPF: NORMAL /HPF (ref 0–2)
SODIUM SERPL-SCNC: 140 MMOL/L (ref 136–145)
SP GR UR STRIP: 1.02 (ref 1–1.03)
T4 FREE SERPL-MCNC: 1.38 NG/DL (ref 0.93–1.7)
TRIGL SERPL-MCNC: 159 MG/DL (ref 0–150)
TSH SERPL DL<=0.005 MIU/L-ACNC: 1.42 UIU/ML (ref 0.27–4.2)
URINALYSIS REFLEX: ABNORMAL
UROBILINOGEN UR STRIP-MCNC: 0.2 MG/DL (ref 0.2–1)
VLDLC SERPL CALC-MCNC: 28 MG/DL (ref 5–40)
WBC # BLD AUTO: 7.93 10*3/MM3 (ref 3.4–10.8)
WBC #/AREA URNS HPF: NORMAL /HPF (ref 0–5)

## 2024-05-01 ENCOUNTER — TELEPHONE (OUTPATIENT)
Dept: ONCOLOGY | Facility: CLINIC | Age: 54
End: 2024-05-01

## 2024-05-13 ENCOUNTER — DIAGNOSTICS ONLY (OUTPATIENT)
Dept: URBAN - METROPOLITAN AREA CLINIC 17 | Facility: CLINIC | Age: 54
End: 2024-05-13

## 2024-05-13 DIAGNOSIS — Z79.899: ICD-10-CM

## 2024-05-13 PROCEDURE — 92083 EXTENDED VISUAL FIELD XM: CPT

## 2024-05-15 ENCOUNTER — HOSPITAL ENCOUNTER (OUTPATIENT)
Dept: MAMMOGRAPHY | Facility: HOSPITAL | Age: 54
Discharge: HOME | End: 2024-05-15
Admitting: INTERNAL MEDICINE
Payer: COMMERCIAL

## 2024-05-15 DIAGNOSIS — Z12.31 SCREENING MAMMOGRAM, ENCOUNTER FOR: ICD-10-CM

## 2024-05-15 DIAGNOSIS — Z91.89 AT HIGH RISK FOR BREAST CANCER: ICD-10-CM

## 2024-05-15 PROCEDURE — 77063 BREAST TOMOSYNTHESIS BI: CPT

## 2024-05-15 PROCEDURE — 77067 SCR MAMMO BI INCL CAD: CPT

## 2024-05-21 ENCOUNTER — OFFICE VISIT (OUTPATIENT)
Dept: ONCOLOGY | Facility: CLINIC | Age: 54
End: 2024-05-21
Payer: COMMERCIAL

## 2024-05-21 VITALS
OXYGEN SATURATION: 97 % | SYSTOLIC BLOOD PRESSURE: 109 MMHG | BODY MASS INDEX: 30.97 KG/M2 | RESPIRATION RATE: 18 BRPM | TEMPERATURE: 98 F | WEIGHT: 174.8 LBS | DIASTOLIC BLOOD PRESSURE: 74 MMHG | HEART RATE: 86 BPM | HEIGHT: 63 IN

## 2024-05-21 DIAGNOSIS — Z91.89 AT HIGH RISK FOR BREAST CANCER: Primary | ICD-10-CM

## 2024-05-21 PROCEDURE — 99213 OFFICE O/P EST LOW 20 MIN: CPT | Performed by: NURSE PRACTITIONER

## 2024-07-28 DIAGNOSIS — F41.1 GENERALIZED ANXIETY DISORDER: Chronic | ICD-10-CM

## 2024-07-29 RX ORDER — ESCITALOPRAM OXALATE 10 MG/1
15 TABLET ORAL DAILY
Qty: 135 TABLET | Refills: 1 | Status: SHIPPED | OUTPATIENT
Start: 2024-07-29

## 2024-10-16 ENCOUNTER — COMPREHENSIVE EXAM (OUTPATIENT)
Dept: URBAN - METROPOLITAN AREA CLINIC 17 | Facility: CLINIC | Age: 54
End: 2024-10-16

## 2024-10-16 DIAGNOSIS — Z79.899: ICD-10-CM

## 2024-10-16 DIAGNOSIS — H43.813: ICD-10-CM

## 2024-10-16 PROCEDURE — 99214 OFFICE O/P EST MOD 30 MIN: CPT

## 2024-10-16 PROCEDURE — 92134 CPTRZ OPH DX IMG PST SGM RTA: CPT

## 2024-10-24 ENCOUNTER — HOSPITAL ENCOUNTER (OUTPATIENT)
Dept: MRI IMAGING | Facility: HOSPITAL | Age: 54
Discharge: HOME | End: 2024-10-24
Admitting: INTERNAL MEDICINE
Payer: COMMERCIAL

## 2024-10-24 DIAGNOSIS — Z91.89 AT HIGH RISK FOR BREAST CANCER: ICD-10-CM

## 2024-10-24 DIAGNOSIS — Z12.31 SCREENING MAMMOGRAM, ENCOUNTER FOR: ICD-10-CM

## 2024-10-24 PROCEDURE — 77049 MRI BREAST C-+ W/CAD BI: CPT

## 2024-10-24 PROCEDURE — A9577 INJ MULTIHANCE: HCPCS | Performed by: INTERNAL MEDICINE

## 2024-10-24 PROCEDURE — 0 GADOBENATE DIMEGLUMINE 529 MG/ML SOLUTION: Performed by: INTERNAL MEDICINE

## 2024-10-24 RX ADMIN — GADOBENATE DIMEGLUMINE 20 ML: 529 INJECTION, SOLUTION INTRAVENOUS at 14:22

## 2024-10-25 ENCOUNTER — TELEPHONE (OUTPATIENT)
Dept: OBSTETRICS AND GYNECOLOGY | Facility: CLINIC | Age: 54
End: 2024-10-25

## 2024-10-25 NOTE — TELEPHONE ENCOUNTER
Provider: DR MCKEON     Caller: VENITA GIL    Relationship to Patient: SELF         Reason for Call: PT HAD A BREAST MRI YESTERDAY AND IS CONCERNED ABOUT THE RESULTS - PT WOULD LIKE RESULTS TO BE REVIEWED BY DR MCKEON AND SHE WOULD LIKE HIS OPINION ABOUT THEM PLEASE CALL

## 2024-10-29 ENCOUNTER — TELEPHONE (OUTPATIENT)
Dept: ONCOLOGY | Facility: CLINIC | Age: 54
End: 2024-10-29
Payer: COMMERCIAL

## 2024-10-29 DIAGNOSIS — Z91.89 AT HIGH RISK FOR BREAST CANCER: Primary | ICD-10-CM

## 2024-10-29 DIAGNOSIS — R92.8 ABNORMAL MRI, BREAST: ICD-10-CM

## 2024-10-29 NOTE — TELEPHONE ENCOUNTER
Contacted Nani by phone to review recommendations from recent MRI.  She did express feeling unsettled about the exam itself due to issues with the placement of the plates and needing to be repositioned several times.  Advised her I would pass this along these concerns to Dr Sanz. Biopsy's ordered and scheduling notified.

## 2024-10-29 NOTE — TELEPHONE ENCOUNTER
Phone call.  We reviewed the patient's MRI together.  There are 2 non-mass enhancement areas that are concerning to the radiologist.  We reviewed the recommendation for biopsy and the patient agrees.  Biopsy has been scheduled for November 11.

## 2024-11-07 NOTE — NURSING NOTE
Precall placed/no answer/VMM left with my name/#/reason for call/left arriv time 0645 @ [facility] Mammo Dept & no MRI/no Aspirin 5 days prior/no NSAIDs/Vit E/Fish oil 3 days prior/need  if wants Valium/may eat/drink/take other meds prior/call if questns.

## 2024-11-07 NOTE — PROGRESS NOTES
11/11/24 0002   Pre-Procedure Phone Call   Procedure Time Verified Yes   Arrival Time 0645   Procedure Location Verified Yes   Medical History Reviewed Yes   NPO Status Reinforced No   Ride and Caregiver Arranged Yes   Phone Number for Ride/Caregiver Joan Ramos, Mother [phone]   Patient Knows to Bring Current Medications No   Bring Outside Films Requested No

## 2024-11-11 ENCOUNTER — HOSPITAL ENCOUNTER (OUTPATIENT)
Dept: MAMMOGRAPHY | Facility: HOSPITAL | Age: 54
Discharge: HOME | End: 2024-11-11
Payer: COMMERCIAL

## 2024-11-11 ENCOUNTER — HOSPITAL ENCOUNTER (OUTPATIENT)
Dept: MRI IMAGING | Facility: HOSPITAL | Age: 54
Discharge: HOME | End: 2024-11-11
Payer: COMMERCIAL

## 2024-11-11 VITALS
SYSTOLIC BLOOD PRESSURE: 121 MMHG | HEART RATE: 82 BPM | BODY MASS INDEX: 31.01 KG/M2 | WEIGHT: 175 LBS | TEMPERATURE: 97.7 F | HEIGHT: 63 IN | OXYGEN SATURATION: 100 % | RESPIRATION RATE: 16 BRPM | DIASTOLIC BLOOD PRESSURE: 82 MMHG

## 2024-11-11 DIAGNOSIS — R92.8 ABNORMAL MRI, BREAST: ICD-10-CM

## 2024-11-11 DIAGNOSIS — Z91.89 AT HIGH RISK FOR BREAST CANCER: ICD-10-CM

## 2024-11-11 LAB — CREAT BLDA-MCNC: 1 MG/DL (ref 0.6–1.3)

## 2024-11-11 PROCEDURE — 0 GADOBENATE DIMEGLUMINE 529 MG/ML SOLUTION: Performed by: INTERNAL MEDICINE

## 2024-11-11 PROCEDURE — A9577 INJ MULTIHANCE: HCPCS | Performed by: INTERNAL MEDICINE

## 2024-11-11 PROCEDURE — C1894 INTRO/SHEATH, NON-LASER: HCPCS

## 2024-11-11 PROCEDURE — A4648 IMPLANTABLE TISSUE MARKER: HCPCS

## 2024-11-11 PROCEDURE — 25010000002 LIDOCAINE 1 % SOLUTION: Performed by: INTERNAL MEDICINE

## 2024-11-11 PROCEDURE — 25010000002 LIDOCAINE-EPINEPHRINE (PF) 1 %-1:200000 SOLUTION: Performed by: INTERNAL MEDICINE

## 2024-11-11 PROCEDURE — 77065 DX MAMMO INCL CAD UNI: CPT

## 2024-11-11 PROCEDURE — 82565 ASSAY OF CREATININE: CPT

## 2024-11-11 PROCEDURE — 88305 TISSUE EXAM BY PATHOLOGIST: CPT | Performed by: INTERNAL MEDICINE

## 2024-11-11 RX ORDER — LIDOCAINE HYDROCHLORIDE 10 MG/ML
2 INJECTION, SOLUTION INFILTRATION; PERINEURAL ONCE
Status: COMPLETED | OUTPATIENT
Start: 2024-11-11 | End: 2024-11-11

## 2024-11-11 RX ORDER — DIAZEPAM 5 MG/1
5 TABLET ORAL ONCE AS NEEDED
Status: COMPLETED | OUTPATIENT
Start: 2024-11-11 | End: 2024-11-11

## 2024-11-11 RX ADMIN — LIDOCAINE HYDROCHLORIDE 30 ML: 10; .005 INJECTION, SOLUTION EPIDURAL; INFILTRATION; INTRACAUDAL; PERINEURAL at 08:39

## 2024-11-11 RX ADMIN — GADOBENATE DIMEGLUMINE 16 ML: 529 INJECTION, SOLUTION INTRAVENOUS at 08:23

## 2024-11-11 RX ADMIN — DIAZEPAM 5 MG: 5 TABLET ORAL at 07:50

## 2024-11-11 RX ADMIN — LIDOCAINE HYDROCHLORIDE 2 ML: 10 INJECTION, SOLUTION INFILTRATION; PERINEURAL at 08:39

## 2024-11-11 NOTE — NURSING NOTE
Biopsy sites x2 to left outer breast clear with Dermabond dry and intact to both sites. Palpable hematoma (approx. 2.5 cm x 2.5 cm) noted just medial to the more anterior biopsy site. Wide Ace Pressure wrap applied in lieu of bra per hematoma protocol. Denies pain. Ice pack with protective covering applied to biopsy site. Patient awake, alert, and oriented x4. Discharge instructions discussed with understanding voiced by patient. Copies provided to patient. No distress noted. Patient to Patient Discharge exit via wheelchair per this nurse. To home via private vehicle driven by her mother.    Imelda BERGMAN

## 2024-11-12 LAB
CYTO UR: NORMAL
LAB AP CASE REPORT: NORMAL
LAB AP INTRADEPARTMENTAL CONSULT: NORMAL
PATH REPORT.FINAL DX SPEC: NORMAL
PATH REPORT.GROSS SPEC: NORMAL

## 2024-11-18 ENCOUNTER — TELEPHONE (OUTPATIENT)
Dept: OBSTETRICS AND GYNECOLOGY | Facility: CLINIC | Age: 54
End: 2024-11-18

## 2024-11-18 NOTE — TELEPHONE ENCOUNTER
"Hub staff attempted to follow warm transfer process and was unsuccessful     Caller: Juliette Manning \"RELL\"    Relationship to patient: Self    Best call back number: [phone] (home)     Patient is needing: WOULD LIKE A PHONE CALL TO DISCUSS LAB RESULTS FOR BIOPSY.    "

## 2024-11-20 ENCOUNTER — TELEPHONE (OUTPATIENT)
Dept: SURGERY | Facility: CLINIC | Age: 54
End: 2024-11-20
Payer: COMMERCIAL

## 2024-11-20 ENCOUNTER — OFFICE VISIT (OUTPATIENT)
Dept: ONCOLOGY | Facility: CLINIC | Age: 54
End: 2024-11-20
Payer: COMMERCIAL

## 2024-11-20 VITALS
OXYGEN SATURATION: 100 % | WEIGHT: 176.8 LBS | SYSTOLIC BLOOD PRESSURE: 118 MMHG | BODY MASS INDEX: 31.33 KG/M2 | TEMPERATURE: 98 F | HEART RATE: 116 BPM | HEIGHT: 63 IN | RESPIRATION RATE: 16 BRPM | DIASTOLIC BLOOD PRESSURE: 80 MMHG

## 2024-11-20 DIAGNOSIS — R92.8 ABNORMAL MRI, BREAST: ICD-10-CM

## 2024-11-20 DIAGNOSIS — Z91.89 AT HIGH RISK FOR BREAST CANCER: Primary | ICD-10-CM

## 2024-11-20 DIAGNOSIS — Z12.31 SCREENING MAMMOGRAM, ENCOUNTER FOR: ICD-10-CM

## 2024-11-20 NOTE — TELEPHONE ENCOUNTER
Chart prepped for Dr. Taylor review and scheduling (referral for microcalcifications and radial scar. Previous patient of Dr. Wang, last seen by CARMENCITA Mendiola in 2022)

## 2024-11-20 NOTE — PROGRESS NOTES
Subjective   Juliette Manning is a 54 y.o. female.  Referred by Dr. 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  She had a paternal aunt who had recently been diagnosed with ovarian cancer.  Her aunt was 84 at the time of diagnosis.  Patient is unsure if her aunt underwent genetic testing but this raised the concern for a possible BRCA1 or 2 mutation in the family.  In terms of other family history her sister who is 48 years old has been diagnosed with precancerous polyps but has not had diagnosis of colon cancer.  Her maternal grandmother had history of either multiple myeloma  in her 40s.  Mother has history of melanoma.  Ms. Manning herself had a palpable abnormality in the left axilla in 2018 which was investigated with a mammogram and an ultrasound and this was further determined to be benign.  She was seen by Dr. Taylor at that time.  She has had routine mammograms ever since.  She denies any new palpable abnormalities of the breast.  She has gained some weight following menopause.  She was on hormone replacement therapy for 5 years.  She reports drinking 1 glass of wine every night.  She exercises about 30 to 40 minutes every day 3 to 4 days a week.  She reports consuming a healthy diet.  Genetic testing of her paternal aunt has been obtained and reviewed and she was noted to be positive for rad 51C pathogenic mutation.  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    She underwent a bilateral salpingo-oophorectomy on 5/3/2021 and pathology was benign.    Screening MRI performed 9/28/2021 -benign    10/24/2024-bilateral breast MRI-right breast without any suspicious abnormalities  Left breast at 12:00 in the mid left breast, 6 cm posterior to the nipple there is a  2 x 0.9 x 1 cm non-mass enhancement which is suspicious.  At 12:00 in the middle to posterior left breast, 6.5 cm posterior to the nipple there is a 0.9 x 0.5 x 1 cm non-mass enhancement which is suspicious.  Axilla is within normal limits.    2024-MRI guided biopsy of the left breast 12 o'clock position abnormalities  1.left breast 12:00 site A-clustered apocrine cyst with focal associated calcium oxalate crystals.  2.left breast 12:00 site B-radial scar, measuring 2.5 mm involving 1 core with adjacent clustered microcysts and coronary cell change, apocrine metaplasia and mild ductal hyperplasia of the usual type.    Interval history:   Juliette Manning is a 54 y.o. female the above-mentioned history who returns today for 6-month follow-up.        The following portions of the patient's history were reviewed and updated as appropriate: allergies, current medications, past family history, past medical history, past social history, past surgical history and problem list.    Past Medical History:   Diagnosis Date    Acne     Anxiety     Arthritis     Asthma     R/T ALLERGIES    Genetic testing of female     RAD51C POSITIVE    IBS (irritable bowel syndrome)     Neck pain     CERVICAL BULDGING DISC        Past Surgical History:   Procedure Laterality Date     SECTION      x2    COLONOSCOPY N/A 2019    Procedure: COLONOSCOPY into cecum and terminal ileum;  Surgeon: Judd Murillo MD;  Location: [facility] ENDOSCOPY;  Service: Gastroenterology    DIAGNOSTIC LAPAROSCOPY Bilateral 2021    Procedure: DIAGNOSTIC LAPAROSCOPY, SALPINGO OOPHORECTOMY LAPAROSCOPIC;  Surgeon: Mikel Wilburn MD;  [place] [facility] [place] [facility] [place]  Service: Obstetrics/Gynecology;  Laterality: Bilateral;    LASIK      OOPHORECTOMY      SKIN LESION EXCISION      Bilat. shoulder and left inner lower leg    TUBAL ABDOMINAL LIGATION          Family History   Problem Relation Age of Onset    Atrial fibrillation Mother     GARFIELD disease Mother      Irritable bowel syndrome Mother     Diabetes Father     Kidney disease Father     Seizures Sister     ADD / ADHD Sister     ADD / ADHD Son     Ovarian cancer Paternal Aunt     Cancer Paternal Aunt         Ovarian Cancer    Cancer Paternal Uncle         Bladder cancer    Diabetes Cousin     Malcourtney Hyperthermia Neg Hx     Breast cancer Neg Hx    Mother with history of melanoma  Maternal grandmother history of multiple myeloma or breast cancer, unclear  Sister-at age 48 precancerous polyps  Paternal aunt with cervical cancer  Another paternal aunt with ovarian cancer the age of 84  Paternal uncle with history of bladder cancer in his 60s    Social History     Socioeconomic History    Marital status:      Spouse name: Rogelio    Number of children: 2   Tobacco Use    Smoking status: Never    Smokeless tobacco: Never   Vaping Use    Vaping status: Never Used   Substance and Sexual Activity    Alcohol use: Yes     Alcohol/week: 2.0 - 3.0 standard drinks of alcohol     Types: 2 - 3 Glasses of wine per week     Comment: weekends    Drug use: No    Sexual activity: Yes     Partners: Male     Birth control/protection: None        OB History          2    Para   2    Term   2            AB        Living             SAB        IAB        Ectopic        Molar        Multiple        Live Births   2            Age at menarche-12  Age of first live childbirth-32  No breast-feeding  HRT use 4 to 5 years, stopped after her last visit with me      Allergies   Allergen Reactions    Ampicillin Hives        Review of systems as mentioned in the HPI    Objective   not currently breastfeeding.   Physical Exam  Constitutional:       General: She is not in acute distress.     Appearance: Normal appearance. She is normal weight. She is not ill-appearing.   HENT:      Mouth/Throat:      Mouth: Mucous membranes are moist.   Eyes:      Extraocular Movements: Extraocular movements intact.      Pupils: Pupils are equal, round,  and reactive to light.   Neck:      Vascular: No carotid bruit.   Cardiovascular:      Rate and Rhythm: Normal rate and regular rhythm.      Pulses: Normal pulses.      Heart sounds: Normal heart sounds. No murmur heard.     No gallop.   Pulmonary:      Effort: Pulmonary effort is normal. No respiratory distress.      Breath sounds: Normal breath sounds. No stridor. No wheezing, rhonchi or rales.   Chest:      Chest wall: No tenderness.   Abdominal:      General: Abdomen is flat. Bowel sounds are normal.      Palpations: Abdomen is soft.   Musculoskeletal:         General: Normal range of motion.      Cervical back: Normal range of motion and neck supple. No rigidity. No muscular tenderness.   Lymphadenopathy:      Cervical: No cervical adenopathy.   Skin:     General: Skin is warm and dry.      Findings: No bruising.   Neurological:      General: No focal deficit present.      Mental Status: She is alert and oriented to person, place, and time.      Cranial Nerves: No cranial nerve deficit.   Psychiatric:         Mood and Affect: Mood normal.         Behavior: Behavior normal.       Breast exam: Patient declined exam today (mammogram last week). Previously-right breast appears normal inspection.  No palpable abnormalities of the right breast.  Left breast appears normal inspection.  No palpable abnormalities of the left breast-prominent breast tissue noted in left axilla and remains unchanged.    I have reexamined the patient and the results are consistent with the previously documented exam except as updated. Eloise Sanz MD        Hospital Outpatient Visit on 11/11/2024   Component Date Value Ref Range Status    Creatinine 11/11/2024 1.00  0.60 - 1.30 mg/dL Final    Serial Number: [id number]:  [id number]    Case Report 11/11/2024    Final                    Value:Surgical Pathology Report                         Case: [id number]                                  Authorizing Provider:  Eloise Sanz MD       " Collected:           11/11/2024 08:48 AM          Ordering Location:     [facility]  Received:            11/11/2024 10:15 AM                                 MRI                                                                          Pathologist:           Cristina Villalpando MD                                                          Specimens:   1) - Breast, Left, Site A left breast MRI bx by Dr. Rogers/ bx time 8:48/ formalin                 time 9:10/ bx time 8:48/# cores 6/clock face 12:00 middle                                           2) - Breast, Left, Site B left breast MRI bx by Dr. Rogers/ bx time 8:52/ formalin                 time 9:10/# cores 5/ clock face 12:00 posterior                                            Final Diagnosis 11/11/2024    Final                    Value:1.  Breast, left middle 12 o'clock position site A, MRI guided core biopsy: (Stoplight clip)   A.  Clustered apocrine cysts with focal associated calcium oxalate crystals (microcalcifications).    2.  Breast, left posterior 12 o'clock position site B, MRI-guided core biopsy: (Infinity clip)  A.  Radial scar, measuring 2.5 mm and involving 1 core with adjacent clustered microcysts with columnar cell change, apocrine metaplasia and mild ductal hyperplasia of the usual type.      Intradepartmental Consult 11/11/2024    Final                    Value:This case is shared in internal consultation with Dr. Pan who concurs.         Gross Description 11/11/2024    Final                    Value:1. Breast, Left.   Received in formalin, labeled with the patient's name, and designated \" site A, left middle breast MRI biopsy at 12:00\", is a 5 chamber well device with cores present in all 5 chamber wells.  The cores are rubbery, yellow to gray-white ranging in size from 0.7 cm up to 2.9 x 0.4 cm in maximal tubal diameter.  There are 2 cores present in the 12:00 and 9:00 wells and 1 core present in the 3:00, 6:00, and central " "wells.  The entire specimen is inked in green and submitted as follows:    1A: 12:00 and 3:00 wells  1B: 6:00 and central wells  1C: 9:00 well    Cold ischemic time: 22 minutes  Total fixation time: 10 hours  Clip shape: Stoplight  Number of cores: 6  Needle gauge: 9    mb/uso/mec      2. Breast, Left.   Received in formalin, labeled with the patient's name, and designated \" site B, left posterior breast MRI biopsy at 12:00\", is a 5 chamber well device with cores present in all 5 chamber wells.  The cores are rubbery, yellow to gray-white ranging in size from                           2.5 cm up to 2.9 x 0.3 cm in maximal tubal diameter.  There is a single core present in each chamber well.  The entire specimen is inked in blue and submitted as follows:    2A: 12:00 and 3:00 wells (the specimens are both bisected through the short axis creating 4 total cores)  2B: 6:00 and 9:00 well  2C: Central well (bisected through the short axis creating 2 cores)    Cold ischemic time: 18 minutes  Total fixation time: 10 hours  Clip shape: Infinity  Number of cores: 5  Needle gauge: 9    mb/uso/mec          Microscopic Description 11/11/2024    Final                    Value:Unless \"gross only\" is specified, the final diagnosis for each specimen is based on microscopic examination of tissue.  Multiple deeper levels of block 2A are performed and reviewed.          No radiology results for the last 30 days.     Mammo Screening Digital Tomosynthesis Bilateral With CAD (05/15/2024 13:44)     Assessment & Plan      Ms. 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    1.  Family history of ovarian cancer  [place] lifetime risk estimate is around 10%  This puts her at average risk for breast cancer.  In regards to the family history of ovarian cancer, we discussed about " her aunt undergoing genetic testing.  Her paternal aunt did undergo genetic testing and she was noted to be positive for RAD51C   This is an autosomal dominant inheritance  Given the positive family history patient underwent genetic testing and is noted to have RAD51C mutation  Explained to her that her risk of developing ovarian cancer is 9 to 11% in her lifetime.  The risk by the age of 49 is noted to be 1%.  Therefore I would recommend her to proceed with a prophylactic oophorectomy to decrease the risk of ovarian cancer.  Also discussed the risk of breast cancer associated with the mutation.  Breast cancer risk is anywhere from 15 to 40% in the lifetime.  There is no clear NCCN recommendations on surveillance however given the elevated breast cancer risk I would recommend performing a mammogram and an MRI annually as well as every 6 monthly breast exams for surveillance.  Explained that there is an increased risk of triple negative breast cancer associated with this mutation.  5/15/2024 bilateral screening mammogram negative, BI-RADS Category 1.  Abnormal breast MRI from October 2024 requiring left breast biopsies x 2.  One of the biopsy sites return radial scar.  She will be referred to Dr. Taylor's office to discuss these findings and possible excision.  She will continue with high risk screening.    2.  Exercises about 3 days a week.  Continue the same.    3.genetic testing-[employer] 47 gene panel positive for RAD 51C mutation.  Referred to genetic counseling.    4.  She had salpingo-oophorectomy on 5/3/2021.  Following that she has experienced some hot flashes.  She used Estrogel for a brief period of time but now stopped.  Explained to her not to use any hormone replacement therapy given the increased risk of breast cancer associated with the mutation.    5.COVID-19 vaccination-she is vaccinated against COVID-19.    Plan:  Mammogram due May 2025, ordered and scheduled today  Abnormal MRI October 2024 with  biopsy showing radial scar  Referral to surgical oncology made today.  APRN in 6 months and MD in 1 year    Bilateral breast MRI images independently reviewed and interpreted by me in detail summarized in the HPI.  Pathology reviewed and interpreted in detail summarized in the HPI

## 2024-11-21 NOTE — TELEPHONE ENCOUNTER
Phone call.  Results were reviewed and discussed with the patient.  MRI guided biopsy was equivocal.  Radiology has recommended an excisional biopsy with general surgery and I agree.  I counseled the patient and answered her questions.  She agrees with this as well.  She has contacted Dr. Taylor's office.  Further recommendations pending the results of her consultation with Dr. Taylor.

## 2024-11-22 ENCOUNTER — TELEPHONE (OUTPATIENT)
Dept: SURGERY | Facility: CLINIC | Age: 54
End: 2024-11-22
Payer: COMMERCIAL

## 2024-11-22 NOTE — TELEPHONE ENCOUNTER
New pt scheduled 12/19 at 1 PM with Dr. Taylor  Patient accepted appointment  New patient paperwork mailed  SD

## 2024-12-16 ENCOUNTER — OFFICE VISIT (OUTPATIENT)
Dept: OBSTETRICS AND GYNECOLOGY | Facility: CLINIC | Age: 54
End: 2024-12-16
Payer: COMMERCIAL

## 2024-12-16 VITALS — DIASTOLIC BLOOD PRESSURE: 78 MMHG | SYSTOLIC BLOOD PRESSURE: 116 MMHG | BODY MASS INDEX: 31.29 KG/M2 | WEIGHT: 176.6 LBS

## 2024-12-16 DIAGNOSIS — Z00.00 ANNUAL PHYSICAL EXAM: Primary | ICD-10-CM

## 2024-12-16 NOTE — PROGRESS NOTES
EVITA Manning  is a 54 y.o. female who presents for a routine gynecologic exam.  Bowels and bladder are functioning normally.  Mammogram showed a concerning lesion.  The patient has an appointment with breast surgery this week.  She is up-to-date with colon cancer screening.  Does not have hot flashes or night sweats.  Bowels and bladder are functioning normally.    Chief Complaint   Patient presents with    Annual Exam      colon  Go over breast biopsy        Past Medical History:   Diagnosis Date    Acne     Anxiety     Arthritis     Asthma     R/T ALLERGIES    Genetic testing of female     RAD51C POSITIVE    IBS (irritable bowel syndrome)     Multiple gestation 02    Neck pain     CERVICAL BULDGING DISC       Past Surgical History:   Procedure Laterality Date     SECTION      x2    COLONOSCOPY N/A 2019    Procedure: COLONOSCOPY into cecum and terminal ileum;  Surgeon: Judd Murillo MD;  Location: [facility] ENDOSCOPY;  Service: Gastroenterology    DIAGNOSTIC LAPAROSCOPY Bilateral 2021    Procedure: DIAGNOSTIC LAPAROSCOPY, SALPINGO OOPHORECTOMY LAPAROSCOPIC;  Surgeon: Mikel Wilburn MD;  [place] [facility] [place] [facility] [place]  Service: Obstetrics/Gynecology;  Laterality: Bilateral;    LASIK      OOPHORECTOMY      SKIN LESION EXCISION      Bilat. shoulder and left inner lower leg    TUBAL ABDOMINAL LIGATION      WISDOM TOOTH EXTRACTION         Social History     Socioeconomic History    Marital status:      Spouse name: Rogelio    Number of children: 2   Tobacco Use    Smoking status: Never    Smokeless tobacco: Never   Vaping Use    Vaping status: Never Used   Substance and Sexual Activity    Alcohol use: Yes     Alcohol/week: 2.0 - 3.0 standard drinks of alcohol     Types: 2 - 3 Glasses of wine per week     Comment: weekends    Drug use: No    Sexual activity: Yes     Partners: Male     Birth control/protection: None       The following portions of the patient's history were reviewed  and updated as appropriate: allergies, current medications, past family history, past medical history, past social history, past surgical history and problem list.    Review of Systems   Constitutional: Negative.    HENT: Negative.     Respiratory: Negative.     Cardiovascular: Negative.    Gastrointestinal: Negative.    Genitourinary: Negative.    Musculoskeletal: Negative.    Skin: Negative.    Allergic/Immunologic: Negative.    Psychiatric/Behavioral: Negative.           Patient reports that she is not currently experiencing any symptoms of urinary incontinence.      Physical Exam  Vitals and nursing note reviewed.   Constitutional:       Appearance: She is well-developed.   HENT:      Head: Normocephalic and atraumatic.   Cardiovascular:      Rate and Rhythm: Normal rate and regular rhythm.   Pulmonary:      Effort: Pulmonary effort is normal.      Breath sounds: Normal breath sounds. No wheezing or rales.   Chest:      Comments: The breasts are homogeneous.  There are no palpable lumps.  Nipple discharge and axillary adenopathy are absent.  Abdominal:      General: There is no distension.      Palpations: Abdomen is soft.      Tenderness: There is no abdominal tenderness.   Genitourinary:     Labia:         Right: No lesion.         Left: No lesion.       Vagina: Normal.      Comments: The cervix is normal in appearance.  It is nontender to palpation.  Pap smear performed.  The uterus is small and mobile within the pelvis.  The adnexa are surgically absent.  There are no pelvic masses palpable.  Skin:     General: Skin is warm and dry.   Neurological:      Mental Status: She is alert and oriented to person, place, and time.         Assessment    Diagnoses and all orders for this visit:    1. Annual physical exam (Primary)        Plan  Annual examination performed  Counseled regarding colon cancer screening.  The patient is current with this.  MRI guided breast biopsy showed a concerning finding.  Further workup  is scheduled with breast surgery later this week.    No follow-ups on file.    Social History     Tobacco Use   Smoking Status Never   Smokeless Tobacco Never

## 2024-12-17 DIAGNOSIS — R09.81 SINUS CONGESTION: ICD-10-CM

## 2024-12-17 RX ORDER — AZELASTINE 1 MG/ML
SPRAY, METERED NASAL
Qty: 30 ML | Refills: 3 | Status: SHIPPED | OUTPATIENT
Start: 2024-12-17

## 2024-12-17 RX ORDER — ALBUTEROL SULFATE 90 UG/1
2 INHALANT RESPIRATORY (INHALATION) EVERY 4 HOURS PRN
Qty: 6.7 G | Refills: 3 | Status: SHIPPED | OUTPATIENT
Start: 2024-12-17

## 2024-12-17 NOTE — TELEPHONE ENCOUNTER
"    Caller: Juliette Manning \"RELL\"    Relationship: Self    Best call back number: [phone]    Requested Prescriptions:   Requested Prescriptions     Pending Prescriptions Disp Refills    albuterol sulfate  (90 Base) MCG/ACT inhaler 6.7 g 3     Sig: Inhale 2 puffs Every 4 (Four) Hours As Needed for Wheezing.        Pharmacy where request should be sent: [id number] [address] AT [facility] [phone] [facility] [phone]      Last office visit with prescribing clinician: 4/23/2024   Last telemedicine visit with prescribing clinician: Visit date not found   Next office visit with prescribing clinician: 4/25/2025     Additional details provided by patient:     Does the patient have less than a 3 day supply:  [x] Yes  [] No        Daryn Nielson Rep   12/17/24 09:22 EST         "

## 2024-12-19 ENCOUNTER — HOSPITAL ENCOUNTER (OUTPATIENT)
Dept: SURGERY | Facility: HOSPITAL | Age: 54
Discharge: HOME | End: 2024-12-19
Payer: COMMERCIAL

## 2024-12-19 ENCOUNTER — OFFICE VISIT (OUTPATIENT)
Dept: SURGERY | Facility: CLINIC | Age: 54
End: 2024-12-19
Payer: COMMERCIAL

## 2024-12-19 ENCOUNTER — PREP FOR SURGERY (OUTPATIENT)
Dept: OTHER | Facility: HOSPITAL | Age: 54
End: 2024-12-19
Payer: COMMERCIAL

## 2024-12-19 VITALS
OXYGEN SATURATION: 100 % | DIASTOLIC BLOOD PRESSURE: 78 MMHG | RESPIRATION RATE: 17 BRPM | BODY MASS INDEX: 31.18 KG/M2 | WEIGHT: 176 LBS | SYSTOLIC BLOOD PRESSURE: 118 MMHG | HEIGHT: 63 IN | HEART RATE: 85 BPM

## 2024-12-19 DIAGNOSIS — N64.89 RADIAL SCAR OF LEFT BREAST: Primary | ICD-10-CM

## 2024-12-19 RX ORDER — DIAZEPAM 5 MG/1
10 TABLET ORAL ONCE
Status: CANCELLED | OUTPATIENT
Start: 2025-01-07 | End: 2024-12-19

## 2024-12-19 NOTE — LETTER
"December 19, 2024     Mikel Wilburn MD  [address]  [place] [id number]    Patient: Juliette Manning   YOB: 1970   Date of Visit: 12/19/2024     Dear Mikel Wilburn MD:       Thank you for referring Juliette Manning to me for evaluation. Below are the relevant portions of my assessment and plan of care.    If you have questions, please do not hesitate to call me. I look forward to following Juliette along with you.         Sincerely,        Carla Taylor MD        CC: Ole Bettencourt DNP, Carla Garcia MD  12/19/24 2127  Sign when Signing Visit  [facility]     Referring Provider: Eloise Sanz MD     Chief complaint:  Left breast radial scar     Subjective  HPI:   12/13/2018:   Ms. Juliette Manning is a 49 yo woman, seen at the request of Dr. Mikel Wilburn, for left axillary lymphadenopathy. She reports that over 10 years ago, she noticed some \"swelling\" in her left armpit. She was told at that time that she had accessory breast tissue located there and this additional tissue has been stable throughout her life. In April 2018 she noticed a painful lump in her left armpit. A left axillary ultrasound done at that time was normal. The lump and the pain eventually went away over 2-3 weeks. Recently, about 2 weeks ago, she felt like the painful lump returned. She noticed it while she was applying deodorant. She describes the pain more like a generalized discomfort. She also reports recent weight loss and says that her bras have not been fitting her very well lately. She denies any associated skin changes or nipple discharge.     She denies any prior history of abnormal mammograms or breast biopsies. She denies any family history of breast or ovarian cancer. She was joined today in clinic by her mother. She gave consent for her mother to be present during her examination and participate in the discussion.     10/20/2022, Visit with RHONDA Melendez:  She has a personal " history of RAD51C mutation, kain salpingo-oophorectomy 2021  She has a family history of ovarian cancer in her paternal aunt.  Today she presents with axillary mass and increased risk for breast cancer. She denies any breast lumps, pain, skin changes, or nipple discharge. She denies any prior history of abnormal mammograms or breast biopsies.    12/19/2024, Interval History:  I initially saw Ms. Manning in 2018 for painful accessory left breast tissue.  There also was some concern about a left axillary lymph node at the time which was morphologically normal and stable in size.  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  She has since had oophorectomy and is being followed by Dr. Sanz. She has been undergoing high risk imaging screening due to the increased breast cancer risk associated with the mutation.  MRI in 10/2024 showed 2 new abnormal areas in the left breast.  These were biopsied and one of the sites showed a radial scar.     Breast history/imaging (updated 12/19/2024):     1/17/18, Screening MMG ( [facility]): Scattered fibroglandular densities. No discrete masses or stellate lesions or suspicious calcifications.  BIRADS: 1 Negative    5/17/18, Left Breast US ( [facility]): In the left axilla there is an echogenic nodule measuring approximately 6 mm in greatest dimension. This is probably a small echogenic lymph node. No sonographic evidence of malignancy. No suspicious masses or other abnormalities are seen.  (This measurement actually refers to the hilum)     12/12/18, Bilateral diagnostic MMG with [facility] & Left US ( [facility]):   MMG - Scattered fibroglandular density. There are no findings to suggest malignancy.  US - The area of concern corresponds to the left axilla where there are several small lymph nodes. Ultrasound also confirms the presence of a lymph node in the area of concern measuring 1.3 x 2 cm and having normal  morphology. It appears similar to the previous ultrasound exam dated 05/17/2018. No other abnormality is seen.  IMPRESSION - The lymph node has normal morphology and appears unchanged from 05/17/2018.   BIRADS: 2 Benign     12/13/2019, Screening MMG with [facility] ( [facility]):  There are scattered fibroglandular densities. No dominant mass, areas of architectural distortion or skin thickening. There is no evidence for axillary lymphadenopathy, nipple retraction nor suspicious  calcifications. Tomosynthesis utilized.  BI-RADS 1: Negative     4/16/2021, Bilateral Screening MMG with [facility] ( [facility]):  There are scattered areas of fibroglandular density.   There are no suspicious masses, calcifications, or areas of architectural distortion.  BI-RADS 1: Negative     9/28/2021, Bilateral Breast MRI ( [facility]):  Scattered fibroglandular tissue is seen throughout both breasts. Mild background parenchymal enhancement of both breasts is noted. A moderate degree of scattered stippled foci of variable enhancement is seen throughout both breasts, none of which appear dominant or clumped. I see no areas of abnormal enhancement or morphology in either breast. There is no evidence for abnormal skin,  nipple or chest wall enhancement of either breast and there is no evidence for axillary or internal mammary chain adenopathy.   BI-RADS  2: Benign findings     5/5/2022, Screening MMG with [facility] ( [facility]):  There are scattered areas of fibroglandular density.   There are no suspicious masses, calcifications, or areas of architectural distortion.  BI-RADS 1: Negative     10/3/2022, Bilateral Breast MRI ( [facility]):  Scattered fibroglandular tissue is seen throughout both breasts. Mild background parenchymal enhancement of both breasts is noted. I see no areas of suspicious enhancement or morphology in either  breast. There is no evidence for abnormal skin, nipple or chest wall enhancement of either breast and I see no evidence for axillary or internal  mammary chain adenopathy.  BI-RADS 1: Negative.     5/12/2023, Screening MMG with [facility] ( [facility]):  Scattered fibroglandular densities are seen throughout both breasts in a pattern which is unchanged. I see no new or dominant masses, areas of architectural distortion or skin thickening. There is no evidence for axillary lymphadenopathy or nipple retraction.  BI-RADS 1: Negative.     10/4/2023, Bilateral Breast MRI ( [facility]):  RIGHT BREAST:    No suspicious enhancing mass or area of non-mass enhancement is identified. The visualized axilla is within normal limits.  LEFT BREAST:    No suspicious enhancing mass or area of non-mass enhancement is identified.  The visualized axilla is within normal limits.   EXTRAMAMMARY FINDINGS:  There are no pathologically enlarged internal mammary chain lymph nodes on either side.     BI-RADS 1: Negative     5/15/2024, Bilateral Screening MMG with [facility] ( [facility]):  There are scattered areas of fibroglandular density.   There are no suspicious masses, calcifications, or areas of architectural distortion.   BI-RADS  1: Negative     10/24/2024, Bilateral Breast MRI ( [facility]):  RIGHT BREAST:    No suspicious enhancing mass or area of non-mass enhancement is identified.  The visualized axilla is within normal limits.  LEFT BREAST:    At 12:00 in the middle left breast, 6 cm posterior to the nipple, there is 2 x 0.9 x 1 cm non-mass enhancement, which is suspicious.  At 12:00 in the middle to posterior left breast, 6.5 cm posterior to the  nipple, there is a 0.9 x 0.5 x 1 cm non-mass enhancement, which is suspicious.  The visualized axilla is within normal limits.   EXTRAMAMMARY FINDINGS:  There are no pathologically enlarged internal mammary chain lymph nodes on either side.     BI-RADS 4: Suspicious      11/11/2024, Left Breast, MRI Biopsy x 2 ( [facility]):  1.  Breast, left middle 12 o'clock position site A, MRI guided core biopsy: (Stoplight clip)               A.  Clustered apocrine cysts with  focal associated calcium oxalate crystals (microcalcifications).  2.  Breast, left posterior 12 o'clock position site B, MRI-guided core biopsy: (Infinity clip)  A.  Radial scar, measuring 2.5 mm and involving 1 core with adjacent clustered microcysts with columnar cell change, apocrine metaplasia and mild ductal hyperplasia of the usual type.  -Post-procedural digital orthogonal mammographic views of the left breast demonstrate the stoplight clip at the expected location at the site of biopsy in the upper central middle left breast. The Infinity clip is laterally displaced by approximately 1 cm.      Review of Systems:  See interval history.       Medications:    Current Outpatient Medications:   •  acyclovir (ZOVIRAX) 400 MG tablet, TAKE 2 TABLETS THREE TIMES A DAY UNTIL GONE, Disp: 30 tablet, Rfl: 2  •  albuterol sulfate  (90 Base) MCG/ACT inhaler, Inhale 2 puffs Every 4 (Four) Hours As Needed for Wheezing., Disp: 6.7 g, Rfl: 3  •  azelastine (ASTELIN) 0.1 % nasal spray, 2 SPRAY IN EACH NOSTRIL DAILY AS DIRECTED, Disp: 30 mL, Rfl: 3  •  calcium polycarbophil (FiberCon) 625 MG tablet, , Disp: , Rfl:   •  Clindamycin Phos-Benzoyl Perox 1.2-5 % gel, Apply 1 application topically to the appropriate area as directed As Needed. (Patient not taking: Reported on 11/11/2024), Disp: , Rfl: 5  •  EPINEPHrine (EPIPEN 2-ELPIDIO IJ), Inject  as directed. (Patient not taking: Reported on 11/11/2024), Disp: , Rfl:   •  escitalopram (LEXAPRO) 10 MG tablet, TAKE 1 AND 1/2 TABLETS BY MOUTH DAILY, Disp: 135 tablet, Rfl: 1  •  Flaxseed, Linseed, (FLAX SEED OIL PO), Take 1 tablet by mouth Daily. PT TO HOLD FOR SURGERY, Disp: , Rfl:   •  Loratadine (CLARITIN PO), Take 10 mg by mouth Daily., Disp: , Rfl:   •  LYSINE PO, Take 1,000 mg by mouth Daily., Disp: , Rfl:   •  multivitamin with minerals tablet tablet, Take 1 tablet by mouth Daily., Disp: , Rfl:   •  polyethylene glycol (MIRALAX) packet, Take 17 g by mouth As Needed. 3 times a  week, Disp: , Rfl:   •  Tetanus-Diphth-Acell Pertussis (BOOSTRIX) 5-2.5-18.5 LF-MCG/0.5 injection, Inject  into the appropriate muscle as directed by prescriber. (Patient not taking: Reported on 11/11/2024), Disp: 0.5 mL, Rfl: 0  •  tretinoin (RETIN-A) 0.05 % cream, Apply 1 Application topically to the appropriate area as directed Every Night., Disp: , Rfl: 5  •  Triamcinolone Acetonide (NASACORT) 55 MCG/ACT nasal inhaler, Administer 2 sprays into the nostril(s) as directed by provider Daily., Disp: , Rfl:   •  Turmeric 500 MG capsule, Take 1 capsule by mouth Daily., Disp: , Rfl:       Allergies   Allergen Reactions   • Ampicillin Hives       Family History   Problem Relation Age of Onset   • Atrial fibrillation Mother    • GARFIELD disease Mother    • Irritable bowel syndrome Mother    • Melanoma Mother    • Diabetes Father    • Kidney disease Father    • Seizures Sister    • ADD / ADHD Sister    • ADD / ADHD Son    • Ovarian cancer Paternal Aunt    • Cancer Paternal Aunt         Ovarian Cancer   • Cancer Paternal Uncle         Bladder cancer   • Diabetes Cousin    • Malig Hyperthermia Neg Hx    • Breast cancer Neg Hx        Objective  PHYSICAL EXAMINATION:   Vitals:    12/19/24 1301   BP: 118/78   Pulse: 85   Resp: 17   SpO2: 100%     ECOG 0 - Asymptomatic  General: NAD, well appearing  Psych: a&o x 3, normal mood and affect  Eyes: EOMI, no scleral icterus  ENMT: neck supple without masses or thyromegaly, mucus membranes moist  MSK: normal gait, normal ROM in bilateral shoulders  Lymph nodes: Increased soft tissue in the left axilla versus the right; no cervical, supraclavicular or axillary lymphadenopathy  Breast: moderate size, grade 1 ptosis, left slightly larger than right  Right: No visible abnormalities on inspection while seated, with arms raised or hands on hips. No masses, skin changes, or nipple abnormalities.  Left: No visible abnormalities on inspection while seated, with arms raised or hands on hips. No  masses, skin changes, or nipple abnormalities.       I have independently reviewed her imaging and here are my findings:   In the left breast at 12:00, there initially was a 2 cm area of non-mass enhancement.  This is biopsy-proven benign marked with a stop light clip.  Also in the left breast at 12:00, there is a 1 cm area of non-mass enhancement.  This is the biopsy-proven radial scar marked with an Infinity clip which is laterally displaced by 1 cm.      Assessment & Plan  Assessment:   1. 54 y.o. F with a a new diagnosis of a left breast radial scar.  2.  She has an increased lifetime risk of breast cancer associated with a RAD51C pathogenic mutation.    Discussion:  We reviewed her pathology and imaging reports. We discussed that the finding of a radial scar is associated with a low risk of identifying atypia, DCIS, or invasive carcinoma within the vicinity of the lesion. This cannot be recognized on the small volume of tissue obtained at percutaneous biopsy. Therefore excisional biopsy with more generous tissue sampling and pathologic analysis is recommended. She is in agreement with this plan.     I explained that excisional biopsy would require preoperative wire-localization. I explained that since the clip is displaced, it may not necessarily be removed in surgical specimen. We discussed that should the final pathology be upgraded, she would likely require an additional operation. We reviewed additional risks and potential complications associated with surgery, including, but not limited to, bleeding, infection, deformity or poor cosmetic result, chronic pain, numbness, seroma, hematoma, altered nipple sensation, deep venous thrombosis, and skin flap necrosis. We reviewed postoperative wound care and activity restrictions. She expressed an understanding of these factors and wished to proceed.    BMI is >= 30 and <35. (Class 1 Obesity). The following options were offered after discussion;: weight loss  educational material (shared in after visit summary) and information on healthy weight added to patient's after visit summary      Plan:  -Left breast needle-localized excisional biopsy (localize original site associated with infinity clip)    Carla Taylor MD    I spent 45 minutes caring for Nani on this date of service. This time includes time spent by me in the following activities: preparing for the visit, performing a medically appropriate examination and/or evaluation , counseling and educating the patient/family/caregiver, referring and communicating with other health care professionals , documenting information in the medical record, and independently interpreting results and communicating that information with the patient/family/caregiver.      CC:  MD Ole Haque, DNP, APRN

## 2024-12-19 NOTE — PROGRESS NOTES
"[facility]     Referring Provider: Eloise Sanz MD     Chief complaint:  Left breast radial scar     Subjective   HPI:   12/13/2018:   Ms. Juliette Manning is a 47 yo woman, seen at the request of Dr. Mikel Wilburn, for left axillary lymphadenopathy. She reports that over 10 years ago, she noticed some \"swelling\" in her left armpit. She was told at that time that she had accessory breast tissue located there and this additional tissue has been stable throughout her life. In April 2018 she noticed a painful lump in her left armpit. A left axillary ultrasound done at that time was normal. The lump and the pain eventually went away over 2-3 weeks. Recently, about 2 weeks ago, she felt like the painful lump returned. She noticed it while she was applying deodorant. She describes the pain more like a generalized discomfort. She also reports recent weight loss and says that her bras have not been fitting her very well lately. She denies any associated skin changes or nipple discharge.     She denies any prior history of abnormal mammograms or breast biopsies. She denies any family history of breast or ovarian cancer. She was joined today in clinic by her mother. She gave consent for her mother to be present during her examination and participate in the discussion.     10/20/2022, Visit with RHONDA Melendez:  She has a personal history of RAD51C mutation, kain salpingo-oophorectomy 2021  She has a family history of ovarian cancer in her paternal aunt.  Today she presents with axillary mass and increased risk for breast cancer. She denies any breast lumps, pain, skin changes, or nipple discharge. She denies any prior history of abnormal mammograms or breast biopsies.    12/19/2024, Interval History:  I initially saw Ms. Manning in 2018 for painful accessory left breast tissue.  There also was some concern about a left axillary lymph node at the time which was morphologically normal and stable in size.  Since that " visit she was found to have a RAD51C mutation when genetic testing was done due to a family history of ovarian cancer.  She has since had oophorectomy and is being followed by Dr. Sanz. She has been undergoing high risk imaging screening due to the increased breast cancer risk associated with the mutation.  MRI in 10/2024 showed 2 new abnormal areas in the left breast.  These were biopsied and one of the sites showed a radial scar.     Breast history/imaging (updated 12/19/2024):     1/17/18, Screening MMG ( [facility]): Scattered fibroglandular densities. No discrete masses or stellate lesions or suspicious calcifications.  BIRADS: 1 Negative    5/17/18, Left Breast US ( [facility]): In the left axilla there is an echogenic nodule measuring approximately 6 mm in greatest dimension. This is probably a small echogenic lymph node. No sonographic evidence of malignancy. No suspicious masses or other abnormalities are seen.  (This measurement actually refers to the hilum)     12/12/18, Bilateral diagnostic MMG with [facility] & Left US ( [facility]):   MMG - Scattered fibroglandular density. There are no findings to suggest malignancy.  US - The area of concern corresponds to the left axilla where there are several small lymph nodes. Ultrasound also confirms the presence of a lymph node in the area of concern measuring 1.3 x 2 cm and having normal morphology. It appears similar to the previous ultrasound exam dated 05/17/2018. No other abnormality is seen.  IMPRESSION - The lymph node has normal morphology and appears unchanged from 05/17/2018.   BIRADS: 2 Benign     12/13/2019, Screening MMG with [facility] ( [facility]):  There are scattered fibroglandular densities. No dominant mass, areas of architectural distortion or skin thickening. There is no evidence for axillary lymphadenopathy, nipple retraction nor suspicious  calcifications. Tomosynthesis utilized.  BI-RADS 1: Negative     4/16/2021, Bilateral Screening MMG with [facility] ( [facility]):  There  are scattered areas of fibroglandular density.   There are no suspicious masses, calcifications, or areas of architectural distortion.  BI-RADS 1: Negative     9/28/2021, Bilateral Breast MRI ( [facility]):  Scattered fibroglandular tissue is seen throughout both breasts. Mild background parenchymal enhancement of both breasts is noted. A moderate degree of scattered stippled foci of variable enhancement is seen throughout both breasts, none of which appear dominant or clumped. I see no areas of abnormal enhancement or morphology in either breast. There is no evidence for abnormal skin,  nipple or chest wall enhancement of either breast and there is no evidence for axillary or internal mammary chain adenopathy.   BI-RADS  2: Benign findings     5/5/2022, Screening MMG with [facility] ( [facility]):  There are scattered areas of fibroglandular density.   There are no suspicious masses, calcifications, or areas of architectural distortion.  BI-RADS 1: Negative     10/3/2022, Bilateral Breast MRI ( [facility]):  Scattered fibroglandular tissue is seen throughout both breasts. Mild background parenchymal enhancement of both breasts is noted. I see no areas of suspicious enhancement or morphology in either  breast. There is no evidence for abnormal skin, nipple or chest wall enhancement of either breast and I see no evidence for axillary or internal mammary chain adenopathy.  BI-RADS 1: Negative.     5/12/2023, Screening MMG with [facility] ( [facility]):  Scattered fibroglandular densities are seen throughout both breasts in a pattern which is unchanged. I see no new or dominant masses, areas of architectural distortion or skin thickening. There is no evidence for axillary lymphadenopathy or nipple retraction.  BI-RADS 1: Negative.     10/4/2023, Bilateral Breast MRI ( [facility]):  RIGHT BREAST:    No suspicious enhancing mass or area of non-mass enhancement is identified. The visualized axilla is within normal limits.  LEFT BREAST:    No suspicious  enhancing mass or area of non-mass enhancement is identified.  The visualized axilla is within normal limits.   EXTRAMAMMARY FINDINGS:  There are no pathologically enlarged internal mammary chain lymph nodes on either side.     BI-RADS 1: Negative     5/15/2024, Bilateral Screening MMG with [facility] ( [facility]):  There are scattered areas of fibroglandular density.   There are no suspicious masses, calcifications, or areas of architectural distortion.   BI-RADS  1: Negative     10/24/2024, Bilateral Breast MRI ( [facility]):  RIGHT BREAST:    No suspicious enhancing mass or area of non-mass enhancement is identified.  The visualized axilla is within normal limits.  LEFT BREAST:    At 12:00 in the middle left breast, 6 cm posterior to the nipple, there is 2 x 0.9 x 1 cm non-mass enhancement, which is suspicious.  At 12:00 in the middle to posterior left breast, 6.5 cm posterior to the  nipple, there is a 0.9 x 0.5 x 1 cm non-mass enhancement, which is suspicious.  The visualized axilla is within normal limits.   EXTRAMAMMARY FINDINGS:  There are no pathologically enlarged internal mammary chain lymph nodes on either side.     BI-RADS 4: Suspicious      11/11/2024, Left Breast, MRI Biopsy x 2 ( [facility]):  1.  Breast, left middle 12 o'clock position site A, MRI guided core biopsy: (Stoplight clip)               A.  Clustered apocrine cysts with focal associated calcium oxalate crystals (microcalcifications).  2.  Breast, left posterior 12 o'clock position site B, MRI-guided core biopsy: (Infinity clip)  A.  Radial scar, measuring 2.5 mm and involving 1 core with adjacent clustered microcysts with columnar cell change, apocrine metaplasia and mild ductal hyperplasia of the usual type.  -Post-procedural digital orthogonal mammographic views of the left breast demonstrate the stoplight clip at the expected location at the site of biopsy in the upper central middle left breast. The Infinity clip is laterally displaced by approximately 1  mehnaz.      Review of Systems:  See interval history.       Medications:    Current Outpatient Medications:     acyclovir (ZOVIRAX) 400 MG tablet, TAKE 2 TABLETS THREE TIMES A DAY UNTIL GONE, Disp: 30 tablet, Rfl: 2    albuterol sulfate  (90 Base) MCG/ACT inhaler, Inhale 2 puffs Every 4 (Four) Hours As Needed for Wheezing., Disp: 6.7 g, Rfl: 3    azelastine (ASTELIN) 0.1 % nasal spray, 2 SPRAY IN EACH NOSTRIL DAILY AS DIRECTED, Disp: 30 mL, Rfl: 3    calcium polycarbophil (FiberCon) 625 MG tablet, , Disp: , Rfl:     Clindamycin Phos-Benzoyl Perox 1.2-5 % gel, Apply 1 application topically to the appropriate area as directed As Needed. (Patient not taking: Reported on 11/11/2024), Disp: , Rfl: 5    EPINEPHrine (EPIPEN 2-ELPIDIO IJ), Inject  as directed. (Patient not taking: Reported on 11/11/2024), Disp: , Rfl:     escitalopram (LEXAPRO) 10 MG tablet, TAKE 1 AND 1/2 TABLETS BY MOUTH DAILY, Disp: 135 tablet, Rfl: 1    Flaxseed, Linseed, (FLAX SEED OIL PO), Take 1 tablet by mouth Daily. PT TO HOLD FOR SURGERY, Disp: , Rfl:     Loratadine (CLARITIN PO), Take 10 mg by mouth Daily., Disp: , Rfl:     LYSINE PO, Take 1,000 mg by mouth Daily., Disp: , Rfl:     multivitamin with minerals tablet tablet, Take 1 tablet by mouth Daily., Disp: , Rfl:     polyethylene glycol (MIRALAX) packet, Take 17 g by mouth As Needed. 3 times a week, Disp: , Rfl:     Tetanus-Diphth-Acell Pertussis (BOOSTRIX) 5-2.5-18.5 LF-MCG/0.5 injection, Inject  into the appropriate muscle as directed by prescriber. (Patient not taking: Reported on 11/11/2024), Disp: 0.5 mL, Rfl: 0    tretinoin (RETIN-A) 0.05 % cream, Apply 1 Application topically to the appropriate area as directed Every Night., Disp: , Rfl: 5    Triamcinolone Acetonide (NASACORT) 55 MCG/ACT nasal inhaler, Administer 2 sprays into the nostril(s) as directed by provider Daily., Disp: , Rfl:     Turmeric 500 MG capsule, Take 1 capsule by mouth Daily., Disp: , Rfl:       Allergies   Allergen  Reactions    Ampicillin Hives       Family History   Problem Relation Age of Onset    Atrial fibrillation Mother     GARFIELD disease Mother     Irritable bowel syndrome Mother     Melanoma Mother     Diabetes Father     Kidney disease Father     Seizures Sister     ADD / ADHD Sister     ADD / ADHD Son     Ovarian cancer Paternal Aunt     Cancer Paternal Aunt         Ovarian Cancer    Cancer Paternal Uncle         Bladder cancer    Diabetes Cousin     Malig Hyperthermia Neg Hx     Breast cancer Neg Hx        Objective   PHYSICAL EXAMINATION:   Vitals:    12/19/24 1301   BP: 118/78   Pulse: 85   Resp: 17   SpO2: 100%     ECOG 0 - Asymptomatic  General: NAD, well appearing  Psych: a&o x 3, normal mood and affect  Eyes: EOMI, no scleral icterus  ENMT: neck supple without masses or thyromegaly, mucus membranes moist  MSK: normal gait, normal ROM in bilateral shoulders  Lymph nodes: Increased soft tissue in the left axilla versus the right; no cervical, supraclavicular or axillary lymphadenopathy  Breast: moderate size, grade 1 ptosis, left slightly larger than right  Right: No visible abnormalities on inspection while seated, with arms raised or hands on hips. No masses, skin changes, or nipple abnormalities.  Left: No visible abnormalities on inspection while seated, with arms raised or hands on hips. No masses, skin changes, or nipple abnormalities.       I have independently reviewed her imaging and here are my findings:   In the left breast at 12:00, there initially was a 2 cm area of non-mass enhancement.  This is biopsy-proven benign marked with a stop light clip.  Also in the left breast at 12:00, there is a 1 cm area of non-mass enhancement.  This is the biopsy-proven radial scar marked with an Infinity clip which is laterally displaced by 1 cm.      Assessment & Plan   Assessment:   1. 54 y.o. F with a a new diagnosis of a left breast radial scar.  2.  She has an increased lifetime risk of breast cancer associated  with a RAD51C pathogenic mutation.    Discussion:  We reviewed her pathology and imaging reports. We discussed that the finding of a radial scar is associated with a low risk of identifying atypia, DCIS, or invasive carcinoma within the vicinity of the lesion. This cannot be recognized on the small volume of tissue obtained at percutaneous biopsy. Therefore excisional biopsy with more generous tissue sampling and pathologic analysis is recommended. She is in agreement with this plan.     I explained that excisional biopsy would require preoperative wire-localization. I explained that since the clip is displaced, it may not necessarily be removed in surgical specimen. We discussed that should the final pathology be upgraded, she would likely require an additional operation. We reviewed additional risks and potential complications associated with surgery, including, but not limited to, bleeding, infection, deformity or poor cosmetic result, chronic pain, numbness, seroma, hematoma, altered nipple sensation, deep venous thrombosis, and skin flap necrosis. We reviewed postoperative wound care and activity restrictions. She expressed an understanding of these factors and wished to proceed.    BMI is >= 30 and <35. (Class 1 Obesity). The following options were offered after discussion;: weight loss educational material (shared in after visit summary) and information on healthy weight added to patient's after visit summary      Plan:  -Left breast needle-localized excisional biopsy (localize original site associated with infinity clip)    Carla Taylor MD    I spent 45 minutes caring for Nani on this date of service. This time includes time spent by me in the following activities: preparing for the visit, performing a medically appropriate examination and/or evaluation , counseling and educating the patient/family/caregiver, referring and communicating with other health care professionals , documenting information  in the medical record, and independently interpreting results and communicating that information with the patient/family/caregiver.      CC:  MD Ole Haque, ONIEL, APRN

## 2024-12-19 NOTE — H&P (VIEW-ONLY)
"[facility]     Referring Provider: Eloise Sanz MD     Chief complaint:  Left breast radial scar     Subjective   HPI:   12/13/2018:   Ms. Juliette Manning is a 49 yo woman, seen at the request of Dr. Mikel Wilburn, for left axillary lymphadenopathy. She reports that over 10 years ago, she noticed some \"swelling\" in her left armpit. She was told at that time that she had accessory breast tissue located there and this additional tissue has been stable throughout her life. In April 2018 she noticed a painful lump in her left armpit. A left axillary ultrasound done at that time was normal. The lump and the pain eventually went away over 2-3 weeks. Recently, about 2 weeks ago, she felt like the painful lump returned. She noticed it while she was applying deodorant. She describes the pain more like a generalized discomfort. She also reports recent weight loss and says that her bras have not been fitting her very well lately. She denies any associated skin changes or nipple discharge.     She denies any prior history of abnormal mammograms or breast biopsies. She denies any family history of breast or ovarian cancer. She was joined today in clinic by her mother. She gave consent for her mother to be present during her examination and participate in the discussion.     10/20/2022, Visit with RHONDA Melendez:  She has a personal history of RAD51C mutation, kain salpingo-oophorectomy 2021  She has a family history of ovarian cancer in her paternal aunt.  Today she presents with axillary mass and increased risk for breast cancer. She denies any breast lumps, pain, skin changes, or nipple discharge. She denies any prior history of abnormal mammograms or breast biopsies.    12/19/2024, Interval History:  I initially saw Ms. Manning in 2018 for painful accessory left breast tissue.  There also was some concern about a left axillary lymph node at the time which was morphologically normal and stable in size.  Since that " visit she was found to have a RAD51C mutation when genetic testing was done due to a family history of ovarian cancer.  She has since had oophorectomy and is being followed by Dr. Sanz. She has been undergoing high risk imaging screening due to the increased breast cancer risk associated with the mutation.  MRI in 10/2024 showed 2 new abnormal areas in the left breast.  These were biopsied and one of the sites showed a radial scar.     Breast history/imaging (updated 12/19/2024):     1/17/18, Screening MMG ( [facility]): Scattered fibroglandular densities. No discrete masses or stellate lesions or suspicious calcifications.  BIRADS: 1 Negative    5/17/18, Left Breast US ( [facility]): In the left axilla there is an echogenic nodule measuring approximately 6 mm in greatest dimension. This is probably a small echogenic lymph node. No sonographic evidence of malignancy. No suspicious masses or other abnormalities are seen.  (This measurement actually refers to the hilum)     12/12/18, Bilateral diagnostic MMG with [facility] & Left US ( [facility]):   MMG - Scattered fibroglandular density. There are no findings to suggest malignancy.  US - The area of concern corresponds to the left axilla where there are several small lymph nodes. Ultrasound also confirms the presence of a lymph node in the area of concern measuring 1.3 x 2 cm and having normal morphology. It appears similar to the previous ultrasound exam dated 05/17/2018. No other abnormality is seen.  IMPRESSION - The lymph node has normal morphology and appears unchanged from 05/17/2018.   BIRADS: 2 Benign     12/13/2019, Screening MMG with [facility] ( [facility]):  There are scattered fibroglandular densities. No dominant mass, areas of architectural distortion or skin thickening. There is no evidence for axillary lymphadenopathy, nipple retraction nor suspicious  calcifications. Tomosynthesis utilized.  BI-RADS 1: Negative     4/16/2021, Bilateral Screening MMG with [facility] ( [facility]):  There  are scattered areas of fibroglandular density.   There are no suspicious masses, calcifications, or areas of architectural distortion.  BI-RADS 1: Negative     9/28/2021, Bilateral Breast MRI ( [facility]):  Scattered fibroglandular tissue is seen throughout both breasts. Mild background parenchymal enhancement of both breasts is noted. A moderate degree of scattered stippled foci of variable enhancement is seen throughout both breasts, none of which appear dominant or clumped. I see no areas of abnormal enhancement or morphology in either breast. There is no evidence for abnormal skin,  nipple or chest wall enhancement of either breast and there is no evidence for axillary or internal mammary chain adenopathy.   BI-RADS  2: Benign findings     5/5/2022, Screening MMG with [facility] ( [facility]):  There are scattered areas of fibroglandular density.   There are no suspicious masses, calcifications, or areas of architectural distortion.  BI-RADS 1: Negative     10/3/2022, Bilateral Breast MRI ( [facility]):  Scattered fibroglandular tissue is seen throughout both breasts. Mild background parenchymal enhancement of both breasts is noted. I see no areas of suspicious enhancement or morphology in either  breast. There is no evidence for abnormal skin, nipple or chest wall enhancement of either breast and I see no evidence for axillary or internal mammary chain adenopathy.  BI-RADS 1: Negative.     5/12/2023, Screening MMG with [facility] ( [facility]):  Scattered fibroglandular densities are seen throughout both breasts in a pattern which is unchanged. I see no new or dominant masses, areas of architectural distortion or skin thickening. There is no evidence for axillary lymphadenopathy or nipple retraction.  BI-RADS 1: Negative.     10/4/2023, Bilateral Breast MRI ( [facility]):  RIGHT BREAST:    No suspicious enhancing mass or area of non-mass enhancement is identified. The visualized axilla is within normal limits.  LEFT BREAST:    No suspicious  enhancing mass or area of non-mass enhancement is identified.  The visualized axilla is within normal limits.   EXTRAMAMMARY FINDINGS:  There are no pathologically enlarged internal mammary chain lymph nodes on either side.     BI-RADS 1: Negative     5/15/2024, Bilateral Screening MMG with [facility] ( [facility]):  There are scattered areas of fibroglandular density.   There are no suspicious masses, calcifications, or areas of architectural distortion.   BI-RADS  1: Negative     10/24/2024, Bilateral Breast MRI ( [facility]):  RIGHT BREAST:    No suspicious enhancing mass or area of non-mass enhancement is identified.  The visualized axilla is within normal limits.  LEFT BREAST:    At 12:00 in the middle left breast, 6 cm posterior to the nipple, there is 2 x 0.9 x 1 cm non-mass enhancement, which is suspicious.  At 12:00 in the middle to posterior left breast, 6.5 cm posterior to the  nipple, there is a 0.9 x 0.5 x 1 cm non-mass enhancement, which is suspicious.  The visualized axilla is within normal limits.   EXTRAMAMMARY FINDINGS:  There are no pathologically enlarged internal mammary chain lymph nodes on either side.     BI-RADS 4: Suspicious      11/11/2024, Left Breast, MRI Biopsy x 2 ( [facility]):  1.  Breast, left middle 12 o'clock position site A, MRI guided core biopsy: (Stoplight clip)               A.  Clustered apocrine cysts with focal associated calcium oxalate crystals (microcalcifications).  2.  Breast, left posterior 12 o'clock position site B, MRI-guided core biopsy: (Infinity clip)  A.  Radial scar, measuring 2.5 mm and involving 1 core with adjacent clustered microcysts with columnar cell change, apocrine metaplasia and mild ductal hyperplasia of the usual type.  -Post-procedural digital orthogonal mammographic views of the left breast demonstrate the stoplight clip at the expected location at the site of biopsy in the upper central middle left breast. The Infinity clip is laterally displaced by approximately 1  mehnaz.      Review of Systems:  See interval history.       Medications:    Current Outpatient Medications:     acyclovir (ZOVIRAX) 400 MG tablet, TAKE 2 TABLETS THREE TIMES A DAY UNTIL GONE, Disp: 30 tablet, Rfl: 2    albuterol sulfate  (90 Base) MCG/ACT inhaler, Inhale 2 puffs Every 4 (Four) Hours As Needed for Wheezing., Disp: 6.7 g, Rfl: 3    azelastine (ASTELIN) 0.1 % nasal spray, 2 SPRAY IN EACH NOSTRIL DAILY AS DIRECTED, Disp: 30 mL, Rfl: 3    calcium polycarbophil (FiberCon) 625 MG tablet, , Disp: , Rfl:     Clindamycin Phos-Benzoyl Perox 1.2-5 % gel, Apply 1 application topically to the appropriate area as directed As Needed. (Patient not taking: Reported on 11/11/2024), Disp: , Rfl: 5    EPINEPHrine (EPIPEN 2-ELPIDIO IJ), Inject  as directed. (Patient not taking: Reported on 11/11/2024), Disp: , Rfl:     escitalopram (LEXAPRO) 10 MG tablet, TAKE 1 AND 1/2 TABLETS BY MOUTH DAILY, Disp: 135 tablet, Rfl: 1    Flaxseed, Linseed, (FLAX SEED OIL PO), Take 1 tablet by mouth Daily. PT TO HOLD FOR SURGERY, Disp: , Rfl:     Loratadine (CLARITIN PO), Take 10 mg by mouth Daily., Disp: , Rfl:     LYSINE PO, Take 1,000 mg by mouth Daily., Disp: , Rfl:     multivitamin with minerals tablet tablet, Take 1 tablet by mouth Daily., Disp: , Rfl:     polyethylene glycol (MIRALAX) packet, Take 17 g by mouth As Needed. 3 times a week, Disp: , Rfl:     Tetanus-Diphth-Acell Pertussis (BOOSTRIX) 5-2.5-18.5 LF-MCG/0.5 injection, Inject  into the appropriate muscle as directed by prescriber. (Patient not taking: Reported on 11/11/2024), Disp: 0.5 mL, Rfl: 0    tretinoin (RETIN-A) 0.05 % cream, Apply 1 Application topically to the appropriate area as directed Every Night., Disp: , Rfl: 5    Triamcinolone Acetonide (NASACORT) 55 MCG/ACT nasal inhaler, Administer 2 sprays into the nostril(s) as directed by provider Daily., Disp: , Rfl:     Turmeric 500 MG capsule, Take 1 capsule by mouth Daily., Disp: , Rfl:       Allergies   Allergen  Reactions    Ampicillin Hives       Family History   Problem Relation Age of Onset    Atrial fibrillation Mother     GARFIELD disease Mother     Irritable bowel syndrome Mother     Melanoma Mother     Diabetes Father     Kidney disease Father     Seizures Sister     ADD / ADHD Sister     ADD / ADHD Son     Ovarian cancer Paternal Aunt     Cancer Paternal Aunt         Ovarian Cancer    Cancer Paternal Uncle         Bladder cancer    Diabetes Cousin     Malig Hyperthermia Neg Hx     Breast cancer Neg Hx        Objective   PHYSICAL EXAMINATION:   Vitals:    12/19/24 1301   BP: 118/78   Pulse: 85   Resp: 17   SpO2: 100%     ECOG 0 - Asymptomatic  General: NAD, well appearing  Psych: a&o x 3, normal mood and affect  Eyes: EOMI, no scleral icterus  ENMT: neck supple without masses or thyromegaly, mucus membranes moist  MSK: normal gait, normal ROM in bilateral shoulders  Lymph nodes: Increased soft tissue in the left axilla versus the right; no cervical, supraclavicular or axillary lymphadenopathy  Breast: moderate size, grade 1 ptosis, left slightly larger than right  Right: No visible abnormalities on inspection while seated, with arms raised or hands on hips. No masses, skin changes, or nipple abnormalities.  Left: No visible abnormalities on inspection while seated, with arms raised or hands on hips. No masses, skin changes, or nipple abnormalities.       I have independently reviewed her imaging and here are my findings:   In the left breast at 12:00, there initially was a 2 cm area of non-mass enhancement.  This is biopsy-proven benign marked with a stop light clip.  Also in the left breast at 12:00, there is a 1 cm area of non-mass enhancement.  This is the biopsy-proven radial scar marked with an Infinity clip which is laterally displaced by 1 cm.      Assessment & Plan   Assessment:   1. 54 y.o. F with a a new diagnosis of a left breast radial scar.  2.  She has an increased lifetime risk of breast cancer associated  with a RAD51C pathogenic mutation.    Discussion:  We reviewed her pathology and imaging reports. We discussed that the finding of a radial scar is associated with a low risk of identifying atypia, DCIS, or invasive carcinoma within the vicinity of the lesion. This cannot be recognized on the small volume of tissue obtained at percutaneous biopsy. Therefore excisional biopsy with more generous tissue sampling and pathologic analysis is recommended. She is in agreement with this plan.     I explained that excisional biopsy would require preoperative wire-localization. I explained that since the clip is displaced, it may not necessarily be removed in surgical specimen. We discussed that should the final pathology be upgraded, she would likely require an additional operation. We reviewed additional risks and potential complications associated with surgery, including, but not limited to, bleeding, infection, deformity or poor cosmetic result, chronic pain, numbness, seroma, hematoma, altered nipple sensation, deep venous thrombosis, and skin flap necrosis. We reviewed postoperative wound care and activity restrictions. She expressed an understanding of these factors and wished to proceed.    BMI is >= 30 and <35. (Class 1 Obesity). The following options were offered after discussion;: weight loss educational material (shared in after visit summary) and information on healthy weight added to patient's after visit summary      Plan:  -Left breast needle-localized excisional biopsy (localize original site associated with infinity clip)    Carla Taylor MD    I spent 45 minutes caring for Nani on this date of service. This time includes time spent by me in the following activities: preparing for the visit, performing a medically appropriate examination and/or evaluation , counseling and educating the patient/family/caregiver, referring and communicating with other health care professionals , documenting information  in the medical record, and independently interpreting results and communicating that information with the patient/family/caregiver.      CC:  MD Ole Haque, ONIEL, APRN

## 2024-12-23 ENCOUNTER — TELEPHONE (OUTPATIENT)
Dept: SURGERY | Facility: CLINIC | Age: 54
End: 2024-12-23
Payer: COMMERCIAL

## 2024-12-23 LAB
CYTOLOGIST CVX/VAG CYTO: NORMAL
CYTOLOGY CVX/VAG DOC CYTO: NORMAL
CYTOLOGY CVX/VAG DOC THIN PREP: NORMAL
DX ICD CODE: NORMAL
HPV I/H RISK 4 DNA CVX QL PROBE+SIG AMP: NEGATIVE
Lab: NORMAL
OTHER STN SPEC: NORMAL
STAT OF ADQ CVX/VAG CYTO-IMP: NORMAL

## 2024-12-23 NOTE — TELEPHONE ENCOUNTER
Patient was notified surgery is scheduled for 1/7/2025 with an arrival time of 5:15 am.with a start time of approximally 9:30 am. Preadmission testing will be 1/2/2025 at 4:30 pm. Advised patient to call if has any questions.    Patient confirmed understanding of dates all and times.     EAS

## 2024-12-31 ENCOUNTER — TELEPHONE (OUTPATIENT)
Dept: SURGERY | Facility: CLINIC | Age: 54
End: 2024-12-31
Payer: COMMERCIAL

## 2024-12-31 NOTE — TELEPHONE ENCOUNTER
Informed patient we needed signature for consent for surgery. Patient will stop by a few minutes before closing on 1/2/2025 to sign.     EAS

## 2025-01-02 ENCOUNTER — PRE-ADMISSION TESTING (OUTPATIENT)
Dept: PREADMISSION TESTING | Facility: HOSPITAL | Age: 55
End: 2025-01-02
Payer: COMMERCIAL

## 2025-01-02 VITALS
OXYGEN SATURATION: 98 % | HEIGHT: 63 IN | DIASTOLIC BLOOD PRESSURE: 69 MMHG | SYSTOLIC BLOOD PRESSURE: 112 MMHG | WEIGHT: 177.1 LBS | TEMPERATURE: 97.4 F | RESPIRATION RATE: 16 BRPM | BODY MASS INDEX: 31.38 KG/M2 | HEART RATE: 94 BPM

## 2025-01-02 DIAGNOSIS — N64.89 RADIAL SCAR OF LEFT BREAST: ICD-10-CM

## 2025-01-02 LAB
ANION GAP SERPL CALCULATED.3IONS-SCNC: 9.8 MMOL/L (ref 5–15)
BASOPHILS # BLD AUTO: 0.06 10*3/MM3 (ref 0–0.2)
BASOPHILS NFR BLD AUTO: 0.8 % (ref 0–1.5)
BUN SERPL-MCNC: 20 MG/DL (ref 6–20)
BUN/CREAT SERPL: 22.5 (ref 7–25)
CALCIUM SPEC-SCNC: 9.8 MG/DL (ref 8.6–10.5)
CHLORIDE SERPL-SCNC: 104 MMOL/L (ref 98–107)
CO2 SERPL-SCNC: 24.2 MMOL/L (ref 22–29)
CREAT SERPL-MCNC: 0.89 MG/DL (ref 0.57–1)
DEPRECATED RDW RBC AUTO: 39 FL (ref 37–54)
EGFRCR SERPLBLD CKD-EPI 2021: 77.2 ML/MIN/1.73
EOSINOPHIL # BLD AUTO: 0.17 10*3/MM3 (ref 0–0.4)
EOSINOPHIL NFR BLD AUTO: 2.3 % (ref 0.3–6.2)
ERYTHROCYTE [DISTWIDTH] IN BLOOD BY AUTOMATED COUNT: 11.8 % (ref 12.3–15.4)
GLUCOSE SERPL-MCNC: 106 MG/DL (ref 65–99)
HCT VFR BLD AUTO: 40.9 % (ref 34–46.6)
HGB BLD-MCNC: 13.8 G/DL (ref 12–15.9)
IMM GRANULOCYTES # BLD AUTO: 0.02 10*3/MM3 (ref 0–0.05)
IMM GRANULOCYTES NFR BLD AUTO: 0.3 % (ref 0–0.5)
LYMPHOCYTES # BLD AUTO: 2.29 10*3/MM3 (ref 0.7–3.1)
LYMPHOCYTES NFR BLD AUTO: 30.3 % (ref 19.6–45.3)
MCH RBC QN AUTO: 30.4 PG (ref 26.6–33)
MCHC RBC AUTO-ENTMCNC: 33.7 G/DL (ref 31.5–35.7)
MCV RBC AUTO: 90.1 FL (ref 79–97)
MONOCYTES # BLD AUTO: 0.63 10*3/MM3 (ref 0.1–0.9)
MONOCYTES NFR BLD AUTO: 8.3 % (ref 5–12)
NEUTROPHILS NFR BLD AUTO: 4.38 10*3/MM3 (ref 1.7–7)
NEUTROPHILS NFR BLD AUTO: 58 % (ref 42.7–76)
NRBC BLD AUTO-RTO: 0 /100 WBC (ref 0–0.2)
PLATELET # BLD AUTO: 378 10*3/MM3 (ref 140–450)
PMV BLD AUTO: 8.9 FL (ref 6–12)
POTASSIUM SERPL-SCNC: 4.6 MMOL/L (ref 3.5–5.2)
RBC # BLD AUTO: 4.54 10*6/MM3 (ref 3.77–5.28)
SODIUM SERPL-SCNC: 138 MMOL/L (ref 136–145)
WBC NRBC COR # BLD AUTO: 7.55 10*3/MM3 (ref 3.4–10.8)

## 2025-01-02 PROCEDURE — 85025 COMPLETE CBC W/AUTO DIFF WBC: CPT | Performed by: SURGERY

## 2025-01-02 PROCEDURE — 93005 ELECTROCARDIOGRAM TRACING: CPT

## 2025-01-02 PROCEDURE — 80048 BASIC METABOLIC PNL TOTAL CA: CPT | Performed by: SURGERY

## 2025-01-02 NOTE — DISCHARGE INSTRUCTIONS
Take the following medications the morning of surgery:    LEXAPRO    BRING INHALER WITH YOU    If you are on prescription narcotic pain medication to control your pain you may also take that medication the morning of surgery.      General Instructions:     Do not eat solid food after midnight the night before surgery.  Clear liquids day of surgery are allowed but must be stopped at least two hours before your hospital arrival time.       Allowed clear liquids      Water, sodas, and tea or coffee with no cream or milk added.       12 to 20 ounces of a clear liquid that contains carbohydrates is recommended.  If non-diabetic, have Gatorade or Powerade.  If diabetic, have G2 or Powerade Zero.     Do not have liquids red in color.  Do not consume chicken, beef, pork or vegetable broth or bouillon cubes of any variety as they are not considered clear liquids and are not allowed.      Infants may have breast milk up to four hours before surgery.  Infants drinking formula may drink formula up to six hours before surgery.   Patients who avoid smoking, chewing tobacco and alcohol for 4 weeks prior to surgery have a reduced risk of post-operative complications.  Quit smoking as many days before surgery as you can.  Do not smoke, use chewing tobacco or drink alcohol the day of surgery.   If applicable bring your C-PAP/ BI-PAP machine in with you to preop day of surgery.  Bring any papers given to you in the doctor’s office.  Wear clean comfortable clothes.  Do not wear contact lenses, false eyelashes or make-up.  Bring a case for your glasses.   Bring crutches or walker if applicable.  Remove all piercings.  Leave jewelry and any other valuables at home.  Hair extensions with metal clips must be removed prior to surgery.  The Pre-Admission Testing nurse will instruct you to bring medications if unable to obtain an accurate list in Pre-Admission Testing.        If you were given a blood bank ID arm band remember to bring it  with you the day of surgery.    Preventing a Surgical Site Infection:  For 2 to 3 days before surgery, avoid shaving with a razor because the razor can irritate skin and make it easier to develop an infection.    Any areas of open skin can increase the risk of a post-operative wound infection by allowing bacteria to enter and travel throughout the body.  Notify your surgeon if you have any skin wounds / rashes even if it is not near the expected surgical site.  The area will need assessed to determine if surgery should be delayed until it is healed.  The night prior to surgery shower using a fresh bar of anti-bacterial soap (such as Dial) and clean washcloth.  Sleep in a clean bed with clean clothing.  Do not allow pets to sleep with you.  Shower on the morning of surgery using a fresh bar of anti-bacterial soap (such as Dial) and clean washcloth.  Dry with a clean towel and dress in clean clothing.  Ask your surgeon if you will be receiving antibiotics prior to surgery.  Make sure you, your family, and all healthcare providers clean their hands with soap and water or an alcohol based hand  before caring for you or your wound.    Day of surgery:  Your arrival time is approximately two hours before your scheduled surgery time.  Please note if you have an early arrival time the surgery doors do not open before 5:00 AM.  Upon arrival, a Pre-op nurse and Anesthesiologist will review your health history, obtain vital signs, and answer questions you may have.  The only belongings needed at this time will be a list of your home medications and if applicable your C-PAP/BI-PAP machine.  A Pre-op nurse will start an IV and you may receive medication in preparation for surgery, including something to help you relax.     Please be aware that surgery does come with discomfort.  We want to make every effort to control your discomfort so please discuss any uncontrolled symptoms with your nurse.   Your doctor will most  likely have prescribed pain medications.      If you are going home after surgery you will receive individualized written care instructions before being discharged.  A responsible adult must drive you to and from the hospital on the day of your surgery and ideally stay with you through the night.   .  Discharge prescriptions can be filled by the hospital pharmacy during regular pharmacy hours.  If you are having surgery late in the day/evening your prescription may be e-prescribed to your pharmacy.  Please verify your pharmacy hours or chose a 24 hour pharmacy to avoid not having access to your prescription because your pharmacy has closed for the day.    If you are staying overnight following surgery, you will be transported to your hospital room following the recovery period.  [facility] has all private rooms.    If you have any questions please call Pre-Admission Testing at [phone].  Deductibles and co-payments are collected on the day of service. Please be prepared to pay the required co-pay, deductible or deposit on the day of service as defined by your plan.    Call your surgeon immediately if you experience any of the following symptoms:  Sore Throat  Shortness of Breath or difficulty breathing  Cough  Chills  Body soreness or muscle pain  Headache  Fever  New loss of taste or smell  Do not arrive for your surgery ill.  Your procedure will need to be rescheduled to another time.  You will need to call your physician before the day of surgery to avoid any unnecessary exposure to hospital staff as well as other patients.     pt ambulatory complaining of sudden onset lower back pain [place] on the train. State she's 17 weeks pregnant, LMP Nov 2, 2019. Also reports pelvic pain for few weeks now. States she fell one flight of stairs prior to arrival.

## 2025-01-03 LAB
QT INTERVAL: 358 MS
QTC INTERVAL: 431 MS

## 2025-01-07 ENCOUNTER — ANESTHESIA (OUTPATIENT)
Dept: PERIOP | Facility: HOSPITAL | Age: 55
End: 2025-01-07
Payer: COMMERCIAL

## 2025-01-07 ENCOUNTER — HOSPITAL ENCOUNTER (OUTPATIENT)
Facility: HOSPITAL | Age: 55
Setting detail: HOSPITAL OUTPATIENT SURGERY
Discharge: HOME | End: 2025-01-07
Attending: SURGERY | Admitting: SURGERY
Payer: COMMERCIAL

## 2025-01-07 ENCOUNTER — ANCILLARY PROCEDURE (OUTPATIENT)
Dept: LAB | Facility: HOSPITAL | Age: 55
End: 2025-01-07
Payer: COMMERCIAL

## 2025-01-07 ENCOUNTER — HOSPITAL ENCOUNTER (OUTPATIENT)
Dept: MAMMOGRAPHY | Facility: HOSPITAL | Age: 55
Discharge: HOME | End: 2025-01-07
Admitting: SURGERY
Payer: COMMERCIAL

## 2025-01-07 ENCOUNTER — APPOINTMENT (OUTPATIENT)
Dept: GENERAL RADIOLOGY | Facility: HOSPITAL | Age: 55
End: 2025-01-07
Payer: COMMERCIAL

## 2025-01-07 ENCOUNTER — ANESTHESIA EVENT (OUTPATIENT)
Dept: PERIOP | Facility: HOSPITAL | Age: 55
End: 2025-01-07
Payer: COMMERCIAL

## 2025-01-07 ENCOUNTER — TRANSCRIBE ORDERS (OUTPATIENT)
Dept: LAB | Facility: HOSPITAL | Age: 55
End: 2025-01-07
Payer: COMMERCIAL

## 2025-01-07 VITALS
RESPIRATION RATE: 16 BRPM | SYSTOLIC BLOOD PRESSURE: 110 MMHG | HEIGHT: 63 IN | WEIGHT: 177.03 LBS | TEMPERATURE: 97.6 F | DIASTOLIC BLOOD PRESSURE: 81 MMHG | BODY MASS INDEX: 31.37 KG/M2 | OXYGEN SATURATION: 100 % | HEART RATE: 77 BPM

## 2025-01-07 DIAGNOSIS — N64.89 RADIAL SCAR OF LEFT BREAST: Primary | ICD-10-CM

## 2025-01-07 DIAGNOSIS — N64.89 RADIAL SCAR OF LEFT BREAST: ICD-10-CM

## 2025-01-07 PROCEDURE — 76098 X-RAY EXAM SURGICAL SPECIMEN: CPT

## 2025-01-07 PROCEDURE — 25010000002 CEFAZOLIN PER 500 MG: Performed by: SURGERY

## 2025-01-07 PROCEDURE — C1819 TISSUE LOCALIZATION-EXCISION: HCPCS | Performed by: SURGERY

## 2025-01-07 PROCEDURE — 25810000003 LACTATED RINGERS PER 1000 ML: Performed by: ANESTHESIOLOGY

## 2025-01-07 PROCEDURE — 88342 IMHCHEM/IMCYTCHM 1ST ANTB: CPT | Performed by: SURGERY

## 2025-01-07 PROCEDURE — 25010000002 ONDANSETRON PER 1 MG

## 2025-01-07 PROCEDURE — 25010000002 FENTANYL CITRATE (PF) 50 MCG/ML SOLUTION

## 2025-01-07 PROCEDURE — 25010000002 MIDAZOLAM PER 1 MG: Performed by: ANESTHESIOLOGY

## 2025-01-07 PROCEDURE — 25010000002 GLYCOPYRROLATE 0.2 MG/ML SOLUTION

## 2025-01-07 PROCEDURE — C1819 TISSUE LOCALIZATION-EXCISION: HCPCS

## 2025-01-07 PROCEDURE — 88307 TISSUE EXAM BY PATHOLOGIST: CPT | Performed by: SURGERY

## 2025-01-07 PROCEDURE — 25010000002 LIDOCAINE 2% SOLUTION

## 2025-01-07 PROCEDURE — 25010000002 SUGAMMADEX 200 MG/2ML SOLUTION

## 2025-01-07 PROCEDURE — 25010000002 LIDOCAINE 1 % SOLUTION: Performed by: SURGERY

## 2025-01-07 PROCEDURE — 25010000002 PROPOFOL 200 MG/20ML EMULSION

## 2025-01-07 PROCEDURE — 19125 EXCISION BREAST LESION: CPT | Performed by: SURGERY

## 2025-01-07 PROCEDURE — 25010000002 DEXAMETHASONE SODIUM PHOSPHATE 20 MG/5ML SOLUTION

## 2025-01-07 RX ORDER — ROCURONIUM BROMIDE 10 MG/ML
INJECTION, SOLUTION INTRAVENOUS AS NEEDED
Status: DISCONTINUED | OUTPATIENT
Start: 2025-01-07 | End: 2025-01-07 | Stop reason: SURG

## 2025-01-07 RX ORDER — SODIUM CHLORIDE 0.9 % (FLUSH) 0.9 %
3 SYRINGE (ML) INJECTION EVERY 12 HOURS SCHEDULED
Status: DISCONTINUED | OUTPATIENT
Start: 2025-01-07 | End: 2025-01-07 | Stop reason: HOSPADM

## 2025-01-07 RX ORDER — FENTANYL CITRATE 50 UG/ML
50 INJECTION, SOLUTION INTRAMUSCULAR; INTRAVENOUS ONCE AS NEEDED
Status: DISCONTINUED | OUTPATIENT
Start: 2025-01-07 | End: 2025-01-07 | Stop reason: HOSPADM

## 2025-01-07 RX ORDER — DIAZEPAM 5 MG/1
10 TABLET ORAL ONCE
Status: COMPLETED | OUTPATIENT
Start: 2025-01-07 | End: 2025-01-07

## 2025-01-07 RX ORDER — LIDOCAINE HYDROCHLORIDE 10 MG/ML
3 INJECTION, SOLUTION INFILTRATION; PERINEURAL ONCE
Status: COMPLETED | OUTPATIENT
Start: 2025-01-07 | End: 2025-01-07

## 2025-01-07 RX ORDER — LIDOCAINE HYDROCHLORIDE 20 MG/ML
INJECTION, SOLUTION INFILTRATION; PERINEURAL AS NEEDED
Status: DISCONTINUED | OUTPATIENT
Start: 2025-01-07 | End: 2025-01-07 | Stop reason: SURG

## 2025-01-07 RX ORDER — PROMETHAZINE HYDROCHLORIDE 25 MG/1
25 SUPPOSITORY RECTAL ONCE AS NEEDED
Status: DISCONTINUED | OUTPATIENT
Start: 2025-01-07 | End: 2025-01-07 | Stop reason: HOSPADM

## 2025-01-07 RX ORDER — PROMETHAZINE HYDROCHLORIDE 25 MG/1
25 TABLET ORAL ONCE AS NEEDED
Status: DISCONTINUED | OUTPATIENT
Start: 2025-01-07 | End: 2025-01-07 | Stop reason: HOSPADM

## 2025-01-07 RX ORDER — LABETALOL HYDROCHLORIDE 5 MG/ML
5 INJECTION, SOLUTION INTRAVENOUS
Status: DISCONTINUED | OUTPATIENT
Start: 2025-01-07 | End: 2025-01-07 | Stop reason: HOSPADM

## 2025-01-07 RX ORDER — OXYCODONE AND ACETAMINOPHEN 7.5; 325 MG/1; MG/1
1 TABLET ORAL EVERY 4 HOURS PRN
Status: DISCONTINUED | OUTPATIENT
Start: 2025-01-07 | End: 2025-01-07 | Stop reason: HOSPADM

## 2025-01-07 RX ORDER — ONDANSETRON 2 MG/ML
4 INJECTION INTRAMUSCULAR; INTRAVENOUS ONCE AS NEEDED
Status: DISCONTINUED | OUTPATIENT
Start: 2025-01-07 | End: 2025-01-07 | Stop reason: HOSPADM

## 2025-01-07 RX ORDER — NALOXONE HCL 0.4 MG/ML
0.2 VIAL (ML) INJECTION AS NEEDED
Status: DISCONTINUED | OUTPATIENT
Start: 2025-01-07 | End: 2025-01-07 | Stop reason: HOSPADM

## 2025-01-07 RX ORDER — MIDAZOLAM HYDROCHLORIDE 1 MG/ML
1 INJECTION, SOLUTION INTRAMUSCULAR; INTRAVENOUS
Status: COMPLETED | OUTPATIENT
Start: 2025-01-07 | End: 2025-01-07

## 2025-01-07 RX ORDER — GLYCOPYRROLATE 0.2 MG/ML
INJECTION INTRAMUSCULAR; INTRAVENOUS AS NEEDED
Status: DISCONTINUED | OUTPATIENT
Start: 2025-01-07 | End: 2025-01-07 | Stop reason: SURG

## 2025-01-07 RX ORDER — PROPOFOL 10 MG/ML
INJECTION, EMULSION INTRAVENOUS AS NEEDED
Status: DISCONTINUED | OUTPATIENT
Start: 2025-01-07 | End: 2025-01-07 | Stop reason: SURG

## 2025-01-07 RX ORDER — SODIUM CHLORIDE 0.9 % (FLUSH) 0.9 %
3-10 SYRINGE (ML) INJECTION AS NEEDED
Status: DISCONTINUED | OUTPATIENT
Start: 2025-01-07 | End: 2025-01-07 | Stop reason: HOSPADM

## 2025-01-07 RX ORDER — FENTANYL CITRATE 50 UG/ML
INJECTION, SOLUTION INTRAMUSCULAR; INTRAVENOUS AS NEEDED
Status: DISCONTINUED | OUTPATIENT
Start: 2025-01-07 | End: 2025-01-07 | Stop reason: SURG

## 2025-01-07 RX ORDER — LIDOCAINE HYDROCHLORIDE 10 MG/ML
0.5 INJECTION, SOLUTION INFILTRATION; PERINEURAL ONCE AS NEEDED
Status: DISCONTINUED | OUTPATIENT
Start: 2025-01-07 | End: 2025-01-07 | Stop reason: HOSPADM

## 2025-01-07 RX ORDER — FENTANYL CITRATE 50 UG/ML
50 INJECTION, SOLUTION INTRAMUSCULAR; INTRAVENOUS
Status: DISCONTINUED | OUTPATIENT
Start: 2025-01-07 | End: 2025-01-07 | Stop reason: HOSPADM

## 2025-01-07 RX ORDER — ONDANSETRON 2 MG/ML
INJECTION INTRAMUSCULAR; INTRAVENOUS AS NEEDED
Status: DISCONTINUED | OUTPATIENT
Start: 2025-01-07 | End: 2025-01-07 | Stop reason: SURG

## 2025-01-07 RX ORDER — HYDROCODONE BITARTRATE AND ACETAMINOPHEN 7.5; 325 MG/1; MG/1
1 TABLET ORAL ONCE AS NEEDED
Status: DISCONTINUED | OUTPATIENT
Start: 2025-01-07 | End: 2025-01-07 | Stop reason: HOSPADM

## 2025-01-07 RX ORDER — TRAMADOL HYDROCHLORIDE 50 MG/1
50 TABLET ORAL EVERY 8 HOURS PRN
Qty: 6 TABLET | Refills: 0 | Status: SHIPPED | OUTPATIENT
Start: 2025-01-07

## 2025-01-07 RX ORDER — IPRATROPIUM BROMIDE AND ALBUTEROL SULFATE 2.5; .5 MG/3ML; MG/3ML
3 SOLUTION RESPIRATORY (INHALATION) ONCE AS NEEDED
Status: DISCONTINUED | OUTPATIENT
Start: 2025-01-07 | End: 2025-01-07 | Stop reason: HOSPADM

## 2025-01-07 RX ORDER — HYDRALAZINE HYDROCHLORIDE 20 MG/ML
5 INJECTION INTRAMUSCULAR; INTRAVENOUS
Status: DISCONTINUED | OUTPATIENT
Start: 2025-01-07 | End: 2025-01-07 | Stop reason: HOSPADM

## 2025-01-07 RX ORDER — FAMOTIDINE 10 MG/ML
20 INJECTION, SOLUTION INTRAVENOUS ONCE
Status: COMPLETED | OUTPATIENT
Start: 2025-01-07 | End: 2025-01-07

## 2025-01-07 RX ORDER — DEXAMETHASONE SODIUM PHOSPHATE 4 MG/ML
INJECTION, SOLUTION INTRA-ARTICULAR; INTRALESIONAL; INTRAMUSCULAR; INTRAVENOUS; SOFT TISSUE AS NEEDED
Status: DISCONTINUED | OUTPATIENT
Start: 2025-01-07 | End: 2025-01-07 | Stop reason: SURG

## 2025-01-07 RX ORDER — ACETAMINOPHEN 500 MG
1000 TABLET ORAL EVERY 8 HOURS
Qty: 30 TABLET | Refills: 0 | Status: SHIPPED | OUTPATIENT
Start: 2025-01-07 | End: 2025-01-12

## 2025-01-07 RX ORDER — SODIUM CHLORIDE, SODIUM LACTATE, POTASSIUM CHLORIDE, CALCIUM CHLORIDE 600; 310; 30; 20 MG/100ML; MG/100ML; MG/100ML; MG/100ML
9 INJECTION, SOLUTION INTRAVENOUS CONTINUOUS
Status: DISCONTINUED | OUTPATIENT
Start: 2025-01-07 | End: 2025-01-07 | Stop reason: HOSPADM

## 2025-01-07 RX ORDER — EPHEDRINE SULFATE 50 MG/ML
5 INJECTION, SOLUTION INTRAVENOUS ONCE AS NEEDED
Status: DISCONTINUED | OUTPATIENT
Start: 2025-01-07 | End: 2025-01-07 | Stop reason: HOSPADM

## 2025-01-07 RX ORDER — FLUMAZENIL 0.1 MG/ML
0.2 INJECTION INTRAVENOUS AS NEEDED
Status: DISCONTINUED | OUTPATIENT
Start: 2025-01-07 | End: 2025-01-07 | Stop reason: HOSPADM

## 2025-01-07 RX ORDER — HYDROMORPHONE HYDROCHLORIDE 1 MG/ML
0.5 INJECTION, SOLUTION INTRAMUSCULAR; INTRAVENOUS; SUBCUTANEOUS
Status: DISCONTINUED | OUTPATIENT
Start: 2025-01-07 | End: 2025-01-07 | Stop reason: HOSPADM

## 2025-01-07 RX ORDER — DIPHENHYDRAMINE HYDROCHLORIDE 50 MG/ML
12.5 INJECTION INTRAMUSCULAR; INTRAVENOUS
Status: DISCONTINUED | OUTPATIENT
Start: 2025-01-07 | End: 2025-01-07 | Stop reason: HOSPADM

## 2025-01-07 RX ADMIN — LIDOCAINE HYDROCHLORIDE 60 MG: 20 INJECTION, SOLUTION INFILTRATION; PERINEURAL at 09:07

## 2025-01-07 RX ADMIN — ROCURONIUM BROMIDE 35 MG: 10 INJECTION, SOLUTION INTRAVENOUS at 09:11

## 2025-01-07 RX ADMIN — FAMOTIDINE 20 MG: 10 INJECTION INTRAVENOUS at 08:31

## 2025-01-07 RX ADMIN — ONDANSETRON 4 MG: 2 INJECTION INTRAMUSCULAR; INTRAVENOUS at 10:02

## 2025-01-07 RX ADMIN — SUGAMMADEX 200 MG: 100 INJECTION, SOLUTION INTRAVENOUS at 10:07

## 2025-01-07 RX ADMIN — MIDAZOLAM 1 MG: 1 INJECTION INTRAMUSCULAR; INTRAVENOUS at 08:46

## 2025-01-07 RX ADMIN — GLYCOPYRROLATE 0.1 MG: 0.2 INJECTION INTRAMUSCULAR; INTRAVENOUS at 09:07

## 2025-01-07 RX ADMIN — SODIUM CHLORIDE 2000 MG: 900 INJECTION INTRAVENOUS at 08:41

## 2025-01-07 RX ADMIN — FENTANYL CITRATE 25 MCG: 50 INJECTION, SOLUTION INTRAMUSCULAR; INTRAVENOUS at 09:20

## 2025-01-07 RX ADMIN — PROPOFOL 200 MG: 10 INJECTION, EMULSION INTRAVENOUS at 09:07

## 2025-01-07 RX ADMIN — MIDAZOLAM 1 MG: 1 INJECTION INTRAMUSCULAR; INTRAVENOUS at 08:36

## 2025-01-07 RX ADMIN — SODIUM CHLORIDE, SODIUM LACTATE, POTASSIUM CHLORIDE, CALCIUM CHLORIDE 9 ML/HR: 20; 30; 600; 310 INJECTION, SOLUTION INTRAVENOUS at 08:31

## 2025-01-07 RX ADMIN — DEXAMETHASONE SODIUM PHOSPHATE 8 MG: 4 INJECTION, SOLUTION INTRAMUSCULAR; INTRAVENOUS at 09:17

## 2025-01-07 RX ADMIN — DIAZEPAM 10 MG: 5 TABLET ORAL at 06:14

## 2025-01-07 RX ADMIN — Medication 3 ML: at 07:51

## 2025-01-07 RX ADMIN — FENTANYL CITRATE 25 MCG: 50 INJECTION, SOLUTION INTRAMUSCULAR; INTRAVENOUS at 10:17

## 2025-01-07 NOTE — OP NOTE
Operative Report    Patient Name:  Juliette Manning  YOB: 1970    Date of Surgery:  1/7/2025    Pre-op Diagnosis:   Radial scar of left breast [N64.89]       Post-Op Diagnosis:  Radial scar of left breast [N64.89]    Procedure:  Left breast needle-localized excisional biopsy      Staff:  Surgeon(s):  Carla Taylor MD      Anesthesia: General    Estimated Blood Loss:  5 mL    Implants:    Nothing was implanted during the procedure    Specimen:          Specimens       ID Source Type Tests Collected By Collected At Frozen?    A Breast, Left Tissue TISSUE PATHOLOGY EXAM   Carla Taylor MD 1/7/25 0945 No    Description: Left breast excisional biopsy, short stitch superior, long stitch lateral, fresh for permanent              Findings: Wire and clip in good position in specimen radiograph.     Complications: None     Indications: The patient is a 54 y.o. F with a a new diagnosis of a left breast radial scar. She presents today for excisional biopsy. This required preoperative wire-localization. The risks and benefits of surgery were discussed preoperatively and she understood and agreed to proceed.     Description of Procedure:  Preoperatively, the patient was taken to the radiology department and the lesion was localized with a needle/wire. I reviewed the post-localization mammogram to determine the trajectory of the wire. The patient was identified in the preoperative area and brought to the operating room and placed supine on the table. Patient verification was performed and general anesthesia was induced. The patient was prepped and draped in sterile fashion with the wire/needle prepped into the field. A time out was performed and all were in agreement. I confirmed that the patient had received preoperative antibiotics.      On the skin, I marked the suggested location of the lesion based on the mammographic localization imaging. The skin was infiltrated with local anesthesia. An upper  outer periareolar incision was made with a scalpel and carried down through the dermis with sharp dissection. Flaps were raised according to the planned dissection. An anterior flap was raised first. Dissection was then carried out superiorly, inferiorly, medially, and laterally. The wire was delivered into the wound. The posterior dissection was completed and the specimen removed. The specimen was oriented with a short stitch superiorly and a long stitch laterally. Specimen radiograph showed the wire and clip in good position.     The breast cavity was irrigated and hemostasis achieved. The remaining local anesthesia was infiltrated into the breast cavity. The breast parenchyma was brought together with 3-0 vicryl stitches. The deep dermis was closed with interrupted 3-0 vicryl stitches. The skin was closed with 4-0 subcuticular running monocryl and covered with skin glue. Counts were correct at the end of the case. The patient was awakened from anesthesia and taken to the PACU in stable condition.    Carla Taylor MD     Date: 1/7/2025  Time: 10:22 EST

## 2025-01-07 NOTE — ANESTHESIA POSTPROCEDURE EVALUATION
Patient: Juliette Manning    Procedure Summary       Date: 01/07/25 Room / Location: [facility] OR  / [facility] MAIN OR    Anesthesia Start: 0903 Anesthesia Stop: 1026    Procedure: Left breast needle-localized excisional biopsy (localize original site associated with infinity clip) (Left: Breast) Diagnosis:       Radial scar of left breast      (Radial scar of left breast [N64.89])    Surgeons: Carla Taylor MD Provider: Marino Urena MD    Anesthesia Type: general ASA Status: 2            Anesthesia Type: general    Vitals  Vitals Value Taken Time   /70 01/07/25 1030   Temp 36.4 °C (97.6 °F) 01/07/25 1030   Pulse 75 01/07/25 1035   Resp 16 01/07/25 1030   SpO2 97 % 01/07/25 1035   Vitals shown include unfiled device data.        Post Anesthesia Care and Evaluation    Patient location: did not personally evaluate patient.    Comments: Discharge criteria met per RN

## 2025-01-07 NOTE — ANESTHESIA PROCEDURE NOTES
Airway  Urgency: elective    Date/Time: 1/7/2025 9:14 AM  Airway not difficult    General Information and Staff    Patient location during procedure: OR  Anesthesiologist: Marino Urena MD  CRNA/CAA: Avani Kimble CRNA    Indications and Patient Condition  Indications for airway management: airway protection    Preoxygenated: yes  MILS not maintained throughout  Mask difficulty assessment: 1 - vent by mask    Final Airway Details  Final airway type: endotracheal airway      Successful airway: ETT  Cuffed: yes   Successful intubation technique: video laryngoscopy  Facilitating devices/methods: intubating stylet  Endotracheal tube insertion site: oral  Blade: CMAC  Blade size: D  ETT size (mm): 7.0  Cormack-Lehane Classification: grade IIa - partial view of glottis  Placement verified by: chest auscultation and capnometry   Cuff volume (mL): 8  Measured from: lips  ETT/EBT  to lips (cm): 20  Number of attempts at approach: 1  Assessment: lips, teeth, and gum same as pre-op and atraumatic intubation

## 2025-01-07 NOTE — ANESTHESIA PREPROCEDURE EVALUATION
Anesthesia Evaluation     Patient summary reviewed and Nursing notes reviewed   no history of anesthetic complications:   NPO Solid Status: > 8 hours  NPO Liquid Status: > 2 hours           Airway   Mallampati: II  TM distance: >3 FB  Neck ROM: full  Dental - normal exam     Pulmonary - normal exam   (+) asthma,  Cardiovascular - negative cardio ROS and normal exam    Rhythm: regular  Rate: normal    (-) angina, orthopnea, PND, DAS      Neuro/Psych  (+) numbness (Cervical radiculopathy at C7), psychiatric history Anxiety  GI/Hepatic/Renal/Endo    (+) obesity    Musculoskeletal     (+) neck pain  Abdominal    Substance History - negative use     OB/GYN negative ob/gyn ROS   (-)  Pregnant        Other   arthritis,                       Anesthesia Plan    ASA 2     general     intravenous induction     Anesthetic plan, risks, benefits, and alternatives have been provided, discussed and informed consent has been obtained with: patient and spouse/significant other.

## 2025-01-09 ENCOUNTER — TELEPHONE (OUTPATIENT)
Dept: SURGERY | Facility: CLINIC | Age: 55
End: 2025-01-09
Payer: COMMERCIAL

## 2025-01-09 LAB
CYTO UR: NORMAL
LAB AP CASE REPORT: NORMAL
LAB AP SPECIAL STAINS: NORMAL
PATH REPORT.FINAL DX SPEC: NORMAL
PATH REPORT.GROSS SPEC: NORMAL

## 2025-01-09 NOTE — TELEPHONE ENCOUNTER
I called Ms. Manning to discuss her pathology report. She is recovering well from surgery. I will see her at her scheduled postoperative appointment.     Carla Taylor MD

## 2025-01-22 ENCOUNTER — OFFICE VISIT (OUTPATIENT)
Dept: SURGERY | Facility: CLINIC | Age: 55
End: 2025-01-22
Payer: COMMERCIAL

## 2025-01-22 VITALS
RESPIRATION RATE: 17 BRPM | BODY MASS INDEX: 31.36 KG/M2 | OXYGEN SATURATION: 100 % | SYSTOLIC BLOOD PRESSURE: 110 MMHG | WEIGHT: 177 LBS | HEIGHT: 63 IN | HEART RATE: 92 BPM | DIASTOLIC BLOOD PRESSURE: 74 MMHG

## 2025-01-22 DIAGNOSIS — Z48.89 POSTOPERATIVE VISIT: Primary | ICD-10-CM

## 2025-01-22 PROCEDURE — 99024 POSTOP FOLLOW-UP VISIT: CPT | Performed by: SURGERY

## 2025-01-22 NOTE — PROGRESS NOTES
"[facility]     Referring Provider: Eloise Sanz MD     Chief complaint: Postoperative visit      Subjective   HPI:   12/13/2018:   Ms. Juliette Manning is a 47 yo woman, seen at the request of Dr. Mikel Wilburn, for left axillary lymphadenopathy. She reports that over 10 years ago, she noticed some \"swelling\" in her left armpit. She was told at that time that she had accessory breast tissue located there and this additional tissue has been stable throughout her life. In April 2018 she noticed a painful lump in her left armpit. A left axillary ultrasound done at that time was normal. The lump and the pain eventually went away over 2-3 weeks. Recently, about 2 weeks ago, she felt like the painful lump returned. She noticed it while she was applying deodorant. She describes the pain more like a generalized discomfort. She also reports recent weight loss and says that her bras have not been fitting her very well lately. She denies any associated skin changes or nipple discharge.     She denies any prior history of abnormal mammograms or breast biopsies. She denies any family history of breast or ovarian cancer. She was joined today in clinic by her mother. She gave consent for her mother to be present during her examination and participate in the discussion.     10/20/2022, Visit with RHONDA Melendez:  She has a personal history of RAD51C mutation, kain salpingo-oophorectomy 2021  She has a family history of ovarian cancer in her paternal aunt.  Today she presents with axillary mass and increased risk for breast cancer. She denies any breast lumps, pain, skin changes, or nipple discharge. She denies any prior history of abnormal mammograms or breast biopsies.    12/19/2024:  I initially saw Ms. Manning in 2018 for painful accessory left breast tissue.  There also was some concern about a left axillary lymph node at the time which was morphologically normal and stable in size.  Since that visit she was found to " have a RAD51C mutation when genetic testing was done due to a family history of ovarian cancer.  She has since had oophorectomy and is being followed by Dr. Sanz. She has been undergoing high risk imaging screening due to the increased breast cancer risk associated with the mutation.  MRI in 10/2024 showed 2 new abnormal areas in the left breast.  These were biopsied and one of the sites showed a radial scar.    1/22/2025, Interval History:  She underwent left breast excisional biopsy on 1/7/25. See surgery & pathology details below in oncologic history. She has been recovering well and has no complaints.        Breast history/imaging (updated 1/22/2025):     1/17/18, Screening MMG ( [facility]): Scattered fibroglandular densities. No discrete masses or stellate lesions or suspicious calcifications.  BIRADS: 1 Negative    5/17/18, Left Breast US ( [facility]): In the left axilla there is an echogenic nodule measuring approximately 6 mm in greatest dimension. This is probably a small echogenic lymph node. No sonographic evidence of malignancy. No suspicious masses or other abnormalities are seen.  (This measurement actually refers to the hilum)     12/12/18, Bilateral diagnostic MMG with [facility] & Left US ( [facility]):   MMG - Scattered fibroglandular density. There are no findings to suggest malignancy.  US - The area of concern corresponds to the left axilla where there are several small lymph nodes. Ultrasound also confirms the presence of a lymph node in the area of concern measuring 1.3 x 2 cm and having normal morphology. It appears similar to the previous ultrasound exam dated 05/17/2018. No other abnormality is seen.  IMPRESSION - The lymph node has normal morphology and appears unchanged from 05/17/2018.   BIRADS: 2 Benign     12/13/2019, Screening MMG with [facility] ( [facility]):  There are scattered fibroglandular densities. No dominant mass, areas of architectural distortion or skin thickening. There is no evidence for axillary  lymphadenopathy, nipple retraction nor suspicious  calcifications. Tomosynthesis utilized.  BI-RADS 1: Negative     4/16/2021, Bilateral Screening MMG with [facility] ( [facility]):  There are scattered areas of fibroglandular density.   There are no suspicious masses, calcifications, or areas of architectural distortion.  BI-RADS 1: Negative     9/28/2021, Bilateral Breast MRI ( [facility]):  Scattered fibroglandular tissue is seen throughout both breasts. Mild background parenchymal enhancement of both breasts is noted. A moderate degree of scattered stippled foci of variable enhancement is seen throughout both breasts, none of which appear dominant or clumped. I see no areas of abnormal enhancement or morphology in either breast. There is no evidence for abnormal skin,  nipple or chest wall enhancement of either breast and there is no evidence for axillary or internal mammary chain adenopathy.   BI-RADS  2: Benign findings     5/5/2022, Screening MMG with [facility] ( [facility]):  There are scattered areas of fibroglandular density.   There are no suspicious masses, calcifications, or areas of architectural distortion.  BI-RADS 1: Negative     10/3/2022, Bilateral Breast MRI ( [facility]):  Scattered fibroglandular tissue is seen throughout both breasts. Mild background parenchymal enhancement of both breasts is noted. I see no areas of suspicious enhancement or morphology in either  breast. There is no evidence for abnormal skin, nipple or chest wall enhancement of either breast and I see no evidence for axillary or internal mammary chain adenopathy.  BI-RADS 1: Negative.     5/12/2023, Screening MMG with [facility] ( [facility]):  Scattered fibroglandular densities are seen throughout both breasts in a pattern which is unchanged. I see no new or dominant masses, areas of architectural distortion or skin thickening. There is no evidence for axillary lymphadenopathy or nipple retraction.  BI-RADS 1: Negative.     10/4/2023, Bilateral Breast MRI (  [facility]):  RIGHT BREAST:    No suspicious enhancing mass or area of non-mass enhancement is identified. The visualized axilla is within normal limits.  LEFT BREAST:    No suspicious enhancing mass or area of non-mass enhancement is identified.  The visualized axilla is within normal limits.   EXTRAMAMMARY FINDINGS:  There are no pathologically enlarged internal mammary chain lymph nodes on either side.     BI-RADS 1: Negative     5/15/2024, Bilateral Screening MMG with T[facility] ( [facility]):  There are scattered areas of fibroglandular density.   There are no suspicious masses, calcifications, or areas of architectural distortion.   BI-RADS  1: Negative     10/24/2024, Bilateral Breast MRI ( [facility]):  RIGHT BREAST:    No suspicious enhancing mass or area of non-mass enhancement is identified.  The visualized axilla is within normal limits.  LEFT BREAST:    At 12:00 in the middle left breast, 6 cm posterior to the nipple, there is 2 x 0.9 x 1 cm non-mass enhancement, which is suspicious.  At 12:00 in the middle to posterior left breast, 6.5 cm posterior to the  nipple, there is a 0.9 x 0.5 x 1 cm non-mass enhancement, which is suspicious.  The visualized axilla is within normal limits.   EXTRAMAMMARY FINDINGS:  There are no pathologically enlarged internal mammary chain lymph nodes on either side.     BI-RADS 4: Suspicious      11/11/2024, Left Breast, MRI Biopsy x 2 ( [facility]):  1.  Breast, left middle 12 o'clock position site A, MRI guided core biopsy: (Stoplight clip)               A.  Clustered apocrine cysts with focal associated calcium oxalate crystals (microcalcifications).  2.  Breast, left posterior 12 o'clock position site B, MRI-guided core biopsy: (Infinity clip)  A.  Radial scar, measuring 2.5 mm and involving 1 core with adjacent clustered microcysts with columnar cell change, apocrine metaplasia and mild ductal hyperplasia of the usual type.  -Post-procedural digital orthogonal mammographic views of the left breast  demonstrate the stoplight clip at the expected location at the site of biopsy in the upper central middle left breast. The Infinity clip is laterally displaced by approximately 1 cm.    1/7/2025, Left breast needle-localized excisional biopsy:  1.  Left breast, excisional biopsy:         A.  Residual radial scar with usual ductal hyperplasia, apocrine cysts, micropapillomas, and clustered microcysts   with columnar cell change.         B.  Negative for atypical hyperplasia or carcinoma.         C.  Clip and biopsy site changes present.      Review of Systems:  See interval history.       Medications:    Current Outpatient Medications:     acyclovir (ZOVIRAX) 400 MG tablet, TAKE 2 TABLETS THREE TIMES A DAY UNTIL GONE (Patient taking differently: Take 1 tablet by mouth 3 (Three) Times a Day As Needed.), Disp: 30 tablet, Rfl: 2    albuterol sulfate  (90 Base) MCG/ACT inhaler, Inhale 2 puffs Every 4 (Four) Hours As Needed for Wheezing., Disp: 6.7 g, Rfl: 3    azelastine (ASTELIN) 0.1 % nasal spray, 2 SPRAY IN EACH NOSTRIL DAILY AS DIRECTED, Disp: 30 mL, Rfl: 3    calcium polycarbophil (FiberCon) 625 MG tablet, , Disp: , Rfl:     Clindamycin Phos-Benzoyl Perox 1.2-5 % gel, Apply 1 application  topically to the appropriate area as directed As Needed., Disp: , Rfl: 5    EPINEPHrine (EPIPEN 2-ELPIDIO IJ), Inject  as directed., Disp: , Rfl:     escitalopram (LEXAPRO) 10 MG tablet, TAKE 1 AND 1/2 TABLETS BY MOUTH DAILY, Disp: 135 tablet, Rfl: 1    Flaxseed, Linseed, (FLAX SEED OIL PO), Take 1 tablet by mouth Daily. PT TO HOLD FOR SURGERY, Disp: , Rfl:     Loratadine (CLARITIN PO), Take 10 mg by mouth Daily., Disp: , Rfl:     LYSINE PO, Take 1,000 mg by mouth Daily. HOLD PRIOR TO SURG, Disp: , Rfl:     multivitamin with minerals tablet tablet, Take 1 tablet by mouth Daily. HOLD PRIOR TO SURG, Disp: , Rfl:     polyethylene glycol (MIRALAX) packet, Take 17 g by mouth As Needed. 3 times a week, Disp: , Rfl:     tretinoin  (RETIN-A) 0.05 % cream, Apply 1 Application topically to the appropriate area as directed Every Night., Disp: , Rfl: 5    Triamcinolone Acetonide (NASACORT) 55 MCG/ACT nasal inhaler, Administer 2 sprays into the nostril(s) as directed by provider Daily., Disp: , Rfl:     Turmeric 500 MG capsule, Take 1 capsule by mouth Daily. HOLD PRIOR TO SURG, Disp: , Rfl:       Allergies   Allergen Reactions    Ampicillin Hives       Family History   Problem Relation Age of Onset    Atrial fibrillation Mother     GARFIELD disease Mother     Irritable bowel syndrome Mother     Melanoma Mother     Diabetes Father     Kidney disease Father     Seizures Sister     ADD / ADHD Sister     ADD / ADHD Son     Ovarian cancer Paternal Aunt     Cancer Paternal Aunt         Ovarian Cancer    Cancer Paternal Uncle         Bladder cancer    Diabetes Cousin     Malig Hyperthermia Neg Hx     Breast cancer Neg Hx        Objective   PHYSICAL EXAMINATION:   Vitals:    01/22/25 0834   BP: 110/74   Pulse: 92   Resp: 17   SpO2: 100%     ECOG 0 - Asymptomatic  General: NAD, well appearing  Psych: a&o x 3, normal mood and affect  Eyes: EOMI, no scleral icterus  ENMT: neck supple without masses or thyromegaly, mucus membranes moist  MSK: normal gait, normal ROM in bilateral shoulders  Lymph nodes: Increased soft tissue in the left axilla versus the right; no cervical, supraclavicular or axillary lymphadenopathy  Breast: moderate size, grade 1 ptosis, left slightly larger than right  Right: deferred.  Left: Well-healing lateral periareolar incision with Dermabond intact.  Minimal swelling.      Assessment & Plan   Assessment:   1. 54 y.o. F sp left breast excisional biopsy of a radial scar on 1/7/2025 with benign final pathology.  2.  She has an increased lifetime risk of breast cancer associated with a RAD51C pathogenic mutation.     Plan:  -Continue high risk screening.  Follow-up in 5/2025 after mammogram with NP.  She already has been getting her imaging  follow-up through Dr. Sanz's office and I gave her the option of continuing to follow-up only with Dr. Sanz.  She would like to continue to be seen in our office for at least the next few years.    Carla Taylor MD      CC:  MD Ole Haque, DNP, APRN

## 2025-01-22 NOTE — LETTER
"January 22, 2025     Mikel Wilburn MD  [address]  [address] [address]    Patient: Juliette Manning   YOB: 1970   Date of Visit: 1/22/2025     Dear Mikel Wilburn MD:       Thank you for referring Juliette Manning to me for evaluation. Below are the relevant portions of my assessment and plan of care.    If you have questions, please do not hesitate to call me. I look forward to following Juliette along with you.         Sincerely,        Carla Taylor MD        CC: Ole Bettencourt DNP, Carla Garcia MD  01/22/25 0852  Sign when Signing Visit  [facility]     Referring Provider: Eloise Sanz MD     Chief complaint: Postoperative visit      Subjective  HPI:   12/13/2018:   Ms. Juliette Manning is a 47 yo woman, seen at the request of Dr. Mikel Wilburn, for left axillary lymphadenopathy. She reports that over 10 years ago, she noticed some \"swelling\" in her left armpit. She was told at that time that she had accessory breast tissue located there and this additional tissue has been stable throughout her life. In April 2018 she noticed a painful lump in her left armpit. A left axillary ultrasound done at that time was normal. The lump and the pain eventually went away over 2-3 weeks. Recently, about 2 weeks ago, she felt like the painful lump returned. She noticed it while she was applying deodorant. She describes the pain more like a generalized discomfort. She also reports recent weight loss and says that her bras have not been fitting her very well lately. She denies any associated skin changes or nipple discharge.     She denies any prior history of abnormal mammograms or breast biopsies. She denies any family history of breast or ovarian cancer. She was joined today in clinic by her mother. She gave consent for her mother to be present during her examination and participate in the discussion.     10/20/2022, Visit with RHONDA Melendez:  She has a personal history " of RAD51C mutation, kain salpingo-oophorectomy 2021  She has a family history of ovarian cancer in her paternal aunt.  Today she presents with axillary mass and increased risk for breast cancer. She denies any breast lumps, pain, skin changes, or nipple discharge. She denies any prior history of abnormal mammograms or breast biopsies.    12/19/2024:  I initially saw Ms. Manning in 2018 for painful accessory left breast tissue.  There also was some concern about a left axillary lymph node at the time which was morphologically normal and stable in size.  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  She has since had oophorectomy and is being followed by Dr. Sanz. She has been undergoing high risk imaging screening due to the increased breast cancer risk associated with the mutation.  MRI in 10/2024 showed 2 new abnormal areas in the left breast.  These were biopsied and one of the sites showed a radial scar.    1/22/2025, Interval History:  She underwent left breast excisional biopsy on 1/7/25. See surgery & pathology details below in oncologic history. She has been recovering well and has no complaints.        Breast history/imaging (updated 1/22/2025):     1/17/18, Screening MMG ( [facility]): Scattered fibroglandular densities. No discrete masses or stellate lesions or suspicious calcifications.  BIRADS: 1 Negative    5/17/18, Left Breast US ( [facility]): In the left axilla there is an echogenic nodule measuring approximately 6 mm in greatest dimension. This is probably a small echogenic lymph node. No sonographic evidence of malignancy. No suspicious masses or other abnormalities are seen.  (This measurement actually refers to the hilum)     12/12/18, Bilateral diagnostic MMG with [facility] & Left US ( [facility]):   MMG - Scattered fibroglandular density. There are no findings to suggest malignancy.  US - The area of concern corresponds to the  left axilla where there are several small lymph nodes. Ultrasound also confirms the presence of a lymph node in the area of concern measuring 1.3 x 2 cm and having normal morphology. It appears similar to the previous ultrasound exam dated 05/17/2018. No other abnormality is seen.  IMPRESSION - The lymph node has normal morphology and appears unchanged from 05/17/2018.   BIRADS: 2 Benign     12/13/2019, Screening MMG with [facility] ( [facility]):  There are scattered fibroglandular densities. No dominant mass, areas of architectural distortion or skin thickening. There is no evidence for axillary lymphadenopathy, nipple retraction nor suspicious  calcifications. Tomosynthesis utilized.  BI-RADS 1: Negative     4/16/2021, Bilateral Screening MMG with [facility] ( [facility]):  There are scattered areas of fibroglandular density.   There are no suspicious masses, calcifications, or areas of architectural distortion.  BI-RADS 1: Negative     9/28/2021, Bilateral Breast MRI ( [facility]):  Scattered fibroglandular tissue is seen throughout both breasts. Mild background parenchymal enhancement of both breasts is noted. A moderate degree of scattered stippled foci of variable enhancement is seen throughout both breasts, none of which appear dominant or clumped. I see no areas of abnormal enhancement or morphology in either breast. There is no evidence for abnormal skin,  nipple or chest wall enhancement of either breast and there is no evidence for axillary or internal mammary chain adenopathy.   BI-RADS  2: Benign findings     5/5/2022, Screening MMG with [facility] ( [facility]):  There are scattered areas of fibroglandular density.   There are no suspicious masses, calcifications, or areas of architectural distortion.  BI-RADS 1: Negative     10/3/2022, Bilateral Breast MRI ([facility]):  Scattered fibroglandular tissue is seen throughout both breasts. Mild background parenchymal enhancement of both breasts is noted. I see no areas of suspicious enhancement  or morphology in either  breast. There is no evidence for abnormal skin, nipple or chest wall enhancement of either breast and I see no evidence for axillary or internal mammary chain adenopathy.  BI-RADS 1: Negative.     5/12/2023, Screening MMG with [facility] ( [facility]):  Scattered fibroglandular densities are seen throughout both breasts in a pattern which is unchanged. I see no new or dominant masses, areas of architectural distortion or skin thickening. There is no evidence for axillary lymphadenopathy or nipple retraction.  BI-RADS 1: Negative.     10/4/2023, Bilateral Breast MRI ( [facility]):  RIGHT BREAST:    No suspicious enhancing mass or area of non-mass enhancement is identified. The visualized axilla is within normal limits.  LEFT BREAST:    No suspicious enhancing mass or area of non-mass enhancement is identified.  The visualized axilla is within normal limits.   EXTRAMAMMARY FINDINGS:  There are no pathologically enlarged internal mammary chain lymph nodes on either side.     BI-RADS 1: Negative     5/15/2024, Bilateral Screening MMG with [facility] ( [facility]):  There are scattered areas of fibroglandular density.   There are no suspicious masses, calcifications, or areas of architectural distortion.   BI-RADS  1: Negative     10/24/2024, Bilateral Breast MRI ( [facility]):  RIGHT BREAST:    No suspicious enhancing mass or area of non-mass enhancement is identified.  The visualized axilla is within normal limits.  LEFT BREAST:    At 12:00 in the middle left breast, 6 cm posterior to the nipple, there is 2 x 0.9 x 1 cm non-mass enhancement, which is suspicious.  At 12:00 in the middle to posterior left breast, 6.5 cm posterior to the  nipple, there is a 0.9 x 0.5 x 1 cm non-mass enhancement, which is suspicious.  The visualized axilla is within normal limits.   EXTRAMAMMARY FINDINGS:  There are no pathologically enlarged internal mammary chain lymph nodes on either side.     BI-RADS 4: Suspicious      11/11/2024, Left  Breast, [facility]  1.  Breast, left middle 12 o'clock position site A, MRI guided core biopsy: (Stoplight clip)               A.  Clustered apocrine cysts with focal associated calcium oxalate crystals (microcalcifications).  2.  Breast, left posterior 12 o'clock position site B, MRI-guided core biopsy: (Infinity clip)  A.  Radial scar, measuring 2.5 mm and involving 1 core with adjacent clustered microcysts with columnar cell change, apocrine metaplasia and mild ductal hyperplasia of the usual type.  -Post-procedural digital orthogonal mammographic views of the left breast demonstrate the stoplight clip at the expected location at the site of biopsy in the upper central middle left breast. The Infinity clip is laterally displaced by approximately 1 cm.    1/7/2025, Left breast needle-localized excisional biopsy:  1.  Left breast, excisional biopsy:         A.  Residual radial scar with usual ductal hyperplasia, apocrine cysts, micropapillomas, and clustered microcysts   with columnar cell change.         B.  Negative for atypical hyperplasia or carcinoma.         C.  Clip and biopsy site changes present.      Review of Systems:  See interval history.       Medications:    Current Outpatient Medications:   •  acyclovir (ZOVIRAX) 400 MG tablet, TAKE 2 TABLETS THREE TIMES A DAY UNTIL GONE (Patient taking differently: Take 1 tablet by mouth 3 (Three) Times a Day As Needed.), Disp: 30 tablet, Rfl: 2  •  albuterol sulfate  (90 Base) MCG/ACT inhaler, Inhale 2 puffs Every 4 (Four) Hours As Needed for Wheezing., Disp: 6.7 g, Rfl: 3  •  azelastine (ASTELIN) 0.1 % nasal spray, 2 SPRAY IN EACH NOSTRIL DAILY AS DIRECTED, Disp: 30 mL, Rfl: 3  •  calcium polycarbophil (FiberCon) 625 MG tablet, , Disp: , Rfl:   •  Clindamycin Phos-Benzoyl Perox 1.2-5 % gel, Apply 1 application  topically to the appropriate area as directed As Needed., Disp: , Rfl: 5  •  EPINEPHrine (EPIPEN 2-ELPIDIO IJ), Inject  as directed.,  Disp: , Rfl:   •  escitalopram (LEXAPRO) 10 MG tablet, TAKE 1 AND 1/2 TABLETS BY MOUTH DAILY, Disp: 135 tablet, Rfl: 1  •  Flaxseed, Linseed, (FLAX SEED OIL PO), Take 1 tablet by mouth Daily. PT TO HOLD FOR SURGERY, Disp: , Rfl:   •  Loratadine (CLARITIN PO), Take 10 mg by mouth Daily., Disp: , Rfl:   •  LYSINE PO, Take 1,000 mg by mouth Daily. HOLD PRIOR TO SURG, Disp: , Rfl:   •  multivitamin with minerals tablet tablet, Take 1 tablet by mouth Daily. HOLD PRIOR TO SURG, Disp: , Rfl:   •  polyethylene glycol (MIRALAX) packet, Take 17 g by mouth As Needed. 3 times a week, Disp: , Rfl:   •  tretinoin (RETIN-A) 0.05 % cream, Apply 1 Application topically to the appropriate area as directed Every Night., Disp: , Rfl: 5  •  Triamcinolone Acetonide (NASACORT) 55 MCG/ACT nasal inhaler, Administer 2 sprays into the nostril(s) as directed by provider Daily., Disp: , Rfl:   •  Turmeric 500 MG capsule, Take 1 capsule by mouth Daily. HOLD PRIOR TO SURG, Disp: , Rfl:       Allergies   Allergen Reactions   • Ampicillin Hives       Family History   Problem Relation Age of Onset   • Atrial fibrillation Mother    • GARFIELD disease Mother    • Irritable bowel syndrome Mother    • Melanoma Mother    • Diabetes Father    • Kidney disease Father    • Seizures Sister    • ADD / ADHD Sister    • ADD / ADHD Son    • Ovarian cancer Paternal Aunt    • Cancer Paternal Aunt         Ovarian Cancer   • Cancer Paternal Uncle         Bladder cancer   • Diabetes Cousin    • Malig Hyperthermia Neg Hx    • Breast cancer Neg Hx        Objective  PHYSICAL EXAMINATION:   Vitals:    01/22/25 0834   BP: 110/74   Pulse: 92   Resp: 17   SpO2: 100%     ECOG 0 - Asymptomatic  General: NAD, well appearing  Psych: a&o x 3, normal mood and affect  Eyes: EOMI, no scleral icterus  ENMT: neck supple without masses or thyromegaly, mucus membranes moist  MSK: normal gait, normal ROM in bilateral shoulders  Lymph nodes: Increased soft tissue in the left axilla versus the  right; no cervical, supraclavicular or axillary lymphadenopathy  Breast: moderate size, grade 1 ptosis, left slightly larger than right  Right: deferred.  Left: Well-healing lateral periareolar incision with Dermabond intact.  Minimal swelling.      Assessment & Plan  Assessment:   1. 54 y.o. F sp left breast excisional biopsy of a radial scar on 1/7/2025 with benign final pathology.  2.  She has an increased lifetime risk of breast cancer associated with a RAD51C pathogenic mutation.     Plan:  -Continue high risk screening.  Follow-up in 5/2025 after mammogram with NP.  She already has been getting her imaging follow-up through Dr. Sanz's office and I gave her the option of continuing to follow-up only with Dr. Sanz.  She would like to continue to be seen in our office for at least the next few years.    Carla Taylor MD      CC:  MD Ole Haque, DNP, APRN

## 2025-01-30 ENCOUNTER — OFFICE VISIT (OUTPATIENT)
Dept: SURGERY | Facility: CLINIC | Age: 55
End: 2025-01-30
Payer: COMMERCIAL

## 2025-01-30 VITALS
OXYGEN SATURATION: 100 % | HEART RATE: 88 BPM | RESPIRATION RATE: 17 BRPM | DIASTOLIC BLOOD PRESSURE: 74 MMHG | SYSTOLIC BLOOD PRESSURE: 112 MMHG | WEIGHT: 177 LBS | BODY MASS INDEX: 31.36 KG/M2 | HEIGHT: 63 IN

## 2025-01-30 DIAGNOSIS — Z48.89 POSTOPERATIVE VISIT: Primary | ICD-10-CM

## 2025-01-30 PROCEDURE — 99024 POSTOP FOLLOW-UP VISIT: CPT | Performed by: SURGERY

## 2025-01-30 RX ORDER — TRIAMCINOLONE ACETONIDE 1 MG/G
1 OINTMENT TOPICAL 2 TIMES DAILY
Qty: 30 G | Refills: 0 | Status: SHIPPED | OUTPATIENT
Start: 2025-01-30 | End: 2025-02-03

## 2025-01-30 RX ORDER — CEPHALEXIN 500 MG/1
500 CAPSULE ORAL 4 TIMES DAILY
Qty: 28 CAPSULE | Refills: 0 | Status: SHIPPED | OUTPATIENT
Start: 2025-01-30 | End: 2025-02-06

## 2025-01-30 NOTE — PROGRESS NOTES
"[facility]     Referring Provider: Eloise Sanz MD     Chief complaint: Postoperative visit      Subjective   HPI:   12/13/2018:   Ms. Juliette Manning is a 49 yo woman, seen at the request of Dr. Mikel Wilburn, for left axillary lymphadenopathy. She reports that over 10 years ago, she noticed some \"swelling\" in her left armpit. She was told at that time that she had accessory breast tissue located there and this additional tissue has been stable throughout her life. In April 2018 she noticed a painful lump in her left armpit. A left axillary ultrasound done at that time was normal. The lump and the pain eventually went away over 2-3 weeks. Recently, about 2 weeks ago, she felt like the painful lump returned. She noticed it while she was applying deodorant. She describes the pain more like a generalized discomfort. She also reports recent weight loss and says that her bras have not been fitting her very well lately. She denies any associated skin changes or nipple discharge.     She denies any prior history of abnormal mammograms or breast biopsies. She denies any family history of breast or ovarian cancer. She was joined today in clinic by her mother. She gave consent for her mother to be present during her examination and participate in the discussion.     10/20/2022, Visit with RHONDA Melendez:  She has a personal history of RAD51C mutation, kain salpingo-oophorectomy 2021  She has a family history of ovarian cancer in her paternal aunt.  Today she presents with axillary mass and increased risk for breast cancer. She denies any breast lumps, pain, skin changes, or nipple discharge. She denies any prior history of abnormal mammograms or breast biopsies.    12/19/2024:  I initially saw Ms. Manning in 2018 for painful accessory left breast tissue.  There also was some concern about a left axillary lymph node at the time which was morphologically normal and stable in size.  Since that visit she was found to " "have a RAD51C mutation when genetic testing was done due to a family history of ovarian cancer.  She has since had oophorectomy and is being followed by Dr. Sanz. She has been undergoing high risk imaging screening due to the increased breast cancer risk associated with the mutation.  MRI in 10/2024 showed 2 new abnormal areas in the left breast.  These were biopsied and one of the sites showed a radial scar.    1/22/2025:  She underwent left breast excisional biopsy on 1/7/25. See surgery & pathology details below in oncologic history. She has been recovering well and has no complaints.     1/30/2025, Interval History:  Last week the incision looked great.  Over the weekend she picked off the Dermabond because it was coming up in a couple of places and applied a \"tallow salve\" which she uses on other places on her body.  Earlier this week she began develop a rash and the breast looked more swollen.  This has progressively gotten worse.       Breast history/imaging (updated 1/30/2025):     1/17/18, Screening MMG ( [facility]): Scattered fibroglandular densities. No discrete masses or stellate lesions or suspicious calcifications.  BIRADS: 1 Negative    5/17/18, Left Breast US ( [facility]): In the left axilla there is an echogenic nodule measuring approximately 6 mm in greatest dimension. This is probably a small echogenic lymph node. No sonographic evidence of malignancy. No suspicious masses or other abnormalities are seen.  (This measurement actually refers to the hilum)     12/12/18, Bilateral diagnostic MMG with [facility] & Left US ( [facility]):   MMG - Scattered fibroglandular density. There are no findings to suggest malignancy.  US - The area of concern corresponds to the left axilla where there are several small lymph nodes. Ultrasound also confirms the presence of a lymph node in the area of concern measuring 1.3 x 2 cm and having normal morphology. It appears similar to the previous ultrasound exam dated 05/17/2018. No " other abnormality is seen.  IMPRESSION - The lymph node has normal morphology and appears unchanged from 05/17/2018.   BIRADS: 2 Benign     12/13/2019, Screening MMG with [facility] ( [facility]):  There are scattered fibroglandular densities. No dominant mass, areas of architectural distortion or skin thickening. There is no evidence for axillary lymphadenopathy, nipple retraction nor suspicious  calcifications. Tomosynthesis utilized.  BI-RADS 1: Negative     4/16/2021, Bilateral Screening MMG with [facility] ( [facility]):  There are scattered areas of fibroglandular density.   There are no suspicious masses, calcifications, or areas of architectural distortion.  BI-RADS 1: Negative     9/28/2021, Bilateral Breast MRI ( [facility]):  Scattered fibroglandular tissue is seen throughout both breasts. Mild background parenchymal enhancement of both breasts is noted. A moderate degree of scattered stippled foci of variable enhancement is seen throughout both breasts, none of which appear dominant or clumped. I see no areas of abnormal enhancement or morphology in either breast. There is no evidence for abnormal skin,  nipple or chest wall enhancement of either breast and there is no evidence for axillary or internal mammary chain adenopathy.   BI-RADS  2: Benign findings     5/5/2022, Screening MMG with [facility] ( [facility]):  There are scattered areas of fibroglandular density.   There are no suspicious masses, calcifications, or areas of architectural distortion.  BI-RADS 1: Negative     10/3/2022, Bilateral Breast MRI ( [facility]):  Scattered fibroglandular tissue is seen throughout both breasts. Mild background parenchymal enhancement of both breasts is noted. I see no areas of suspicious enhancement or morphology in either  breast. There is no evidence for abnormal skin, nipple or chest wall enhancement of either breast and I see no evidence for axillary or internal mammary chain adenopathy.  BI-RADS 1: Negative.     5/12/2023, Screening MMG with [facility]  ( [facility]):  Scattered fibroglandular densities are seen throughout both breasts in a pattern which is unchanged. I see no new or dominant masses, areas of architectural distortion or skin thickening. There is no evidence for axillary lymphadenopathy or nipple retraction.  BI-RADS 1: Negative.     10/4/2023, Bilateral Breast MRI ( [facility]):  RIGHT BREAST:    No suspicious enhancing mass or area of non-mass enhancement is identified. The visualized axilla is within normal limits.  LEFT BREAST:    No suspicious enhancing mass or area of non-mass enhancement is identified.  The visualized axilla is within normal limits.   EXTRAMAMMARY FINDINGS:  There are no pathologically enlarged internal mammary chain lymph nodes on either side.     BI-RADS 1: Negative     5/15/2024, Bilateral Screening MMG with [facility] ( [facility]):  There are scattered areas of fibroglandular density.   There are no suspicious masses, calcifications, or areas of architectural distortion.   BI-RADS  1: Negative     10/24/2024, Bilateral Breast MRI ( [facility]):  RIGHT BREAST:    No suspicious enhancing mass or area of non-mass enhancement is identified.  The visualized axilla is within normal limits.  LEFT BREAST:    At 12:00 in the middle left breast, 6 cm posterior to the nipple, there is 2 x 0.9 x 1 cm non-mass enhancement, which is suspicious.  At 12:00 in the middle to posterior left breast, 6.5 cm posterior to the  nipple, there is a 0.9 x 0.5 x 1 cm non-mass enhancement, which is suspicious.  The visualized axilla is within normal limits.   EXTRAMAMMARY FINDINGS:  There are no pathologically enlarged internal mammary chain lymph nodes on either side.     BI-RADS 4: Suspicious      11/11/2024, Left Breast, MRI Biopsy x 2 ( [facility]):  1.  Breast, left middle 12 o'clock position site A, MRI guided core biopsy: (Stoplight clip)               A.  Clustered apocrine cysts with focal associated calcium oxalate crystals (microcalcifications).  2.  Breast, left  posterior 12 o'clock position site B, MRI-guided core biopsy: (Infinity clip)  A.  Radial scar, measuring 2.5 mm and involving 1 core with adjacent clustered microcysts with columnar cell change, apocrine metaplasia and mild ductal hyperplasia of the usual type.  -Post-procedural digital orthogonal mammographic views of the left breast demonstrate the stoplight clip at the expected location at the site of biopsy in the upper central middle left breast. The Infinity clip is laterally displaced by approximately 1 cm.    1/7/2025, Left breast needle-localized excisional biopsy:  1.  Left breast, excisional biopsy:         A.  Residual radial scar with usual ductal hyperplasia, apocrine cysts, micropapillomas, and clustered microcysts   with columnar cell change.         B.  Negative for atypical hyperplasia or carcinoma.         C.  Clip and biopsy site changes present.      Review of Systems:  See interval history.       Medications:    Current Outpatient Medications:     acyclovir (ZOVIRAX) 400 MG tablet, TAKE 2 TABLETS THREE TIMES A DAY UNTIL GONE (Patient taking differently: Take 1 tablet by mouth 3 (Three) Times a Day As Needed.), Disp: 30 tablet, Rfl: 2    albuterol sulfate  (90 Base) MCG/ACT inhaler, Inhale 2 puffs Every 4 (Four) Hours As Needed for Wheezing., Disp: 6.7 g, Rfl: 3    azelastine (ASTELIN) 0.1 % nasal spray, 2 SPRAY IN EACH NOSTRIL DAILY AS DIRECTED, Disp: 30 mL, Rfl: 3    calcium polycarbophil (FiberCon) 625 MG tablet, , Disp: , Rfl:     cephalexin (KEFLEX) 500 MG capsule, Take 1 capsule by mouth 4 (Four) Times a Day for 7 days., Disp: 28 capsule, Rfl: 0    Clindamycin Phos-Benzoyl Perox 1.2-5 % gel, Apply 1 application  topically to the appropriate area as directed As Needed., Disp: , Rfl: 5    EPINEPHrine (EPIPEN 2-ELPIDIO IJ), Inject  as directed., Disp: , Rfl:     escitalopram (LEXAPRO) 10 MG tablet, TAKE 1 AND 1/2 TABLETS BY MOUTH DAILY, Disp: 135 tablet, Rfl: 1    Flaxseed, Linseed, (FLAX  SEED OIL PO), Take 1 tablet by mouth Daily. PT TO HOLD FOR SURGERY, Disp: , Rfl:     Loratadine (CLARITIN PO), Take 10 mg by mouth Daily., Disp: , Rfl:     LYSINE PO, Take 1,000 mg by mouth Daily. HOLD PRIOR TO SURG, Disp: , Rfl:     multivitamin with minerals tablet tablet, Take 1 tablet by mouth Daily. HOLD PRIOR TO SURG, Disp: , Rfl:     polyethylene glycol (MIRALAX) packet, Take 17 g by mouth As Needed. 3 times a week, Disp: , Rfl:     tretinoin (RETIN-A) 0.05 % cream, Apply 1 Application topically to the appropriate area as directed Every Night., Disp: , Rfl: 5    triamcinolone (KENALOG) 0.1 % ointment, Apply 1 Application topically to the appropriate area as directed 2 (Two) Times a Day., Disp: 30 g, Rfl: 0    Triamcinolone Acetonide (NASACORT) 55 MCG/ACT nasal inhaler, Administer 2 sprays into the nostril(s) as directed by provider Daily., Disp: , Rfl:     Turmeric 500 MG capsule, Take 1 capsule by mouth Daily. HOLD PRIOR TO SURG, Disp: , Rfl:       Allergies   Allergen Reactions    Ampicillin Hives       Family History   Problem Relation Age of Onset    Atrial fibrillation Mother     GARFIELD disease Mother     Irritable bowel syndrome Mother     Melanoma Mother     Diabetes Father     Kidney disease Father     Seizures Sister     ADD / ADHD Sister     ADD / ADHD Son     Ovarian cancer Paternal Aunt     Cancer Paternal Aunt         Ovarian Cancer    Cancer Paternal Uncle         Bladder cancer    Diabetes Cousin     Malig Hyperthermia Neg Hx     Breast cancer Neg Hx        Objective   PHYSICAL EXAMINATION:   Vitals:    01/30/25 1232   BP: 112/74   Pulse: 88   Resp: 17   SpO2: 100%     ECOG 0 - Asymptomatic  General: NAD, well appearing  Psych: a&o x 3, normal mood and affect  Eyes: EOMI, no scleral icterus  ENMT: neck supple without masses or thyromegaly, mucus membranes moist  MSK: normal gait, normal ROM in bilateral shoulders  Lymph nodes: Increased soft tissue in the left axilla versus the right; no cervical,  supraclavicular or axillary lymphadenopathy  Breast: moderate size, grade 1 ptosis, left slightly larger than right  Right: deferred.  Left: Well-healing lateral periareolar incision.  Surrounding the incision on the breast and on the areola is what looks like allergic dermatitis.    Left breast, in-office US: Expected amount of postoperative fluid    Assessment & Plan   Assessment:   1. 54 y.o. F sp left breast excisional biopsy of a radial scar on 1/7/2025 with benign final pathology.  2.  She has an increased lifetime risk of breast cancer associated with a RAD51C pathogenic mutation.  -New allergic dermatitis.     Plan:  -Keflex prescribed for possible infectious component and topical triamcinolone ointment.  Follow-up in a few days for recheck.    Carla Taylor MD

## 2025-01-31 DIAGNOSIS — F41.1 GENERALIZED ANXIETY DISORDER: Chronic | ICD-10-CM

## 2025-01-31 RX ORDER — ESCITALOPRAM OXALATE 10 MG/1
15 TABLET ORAL DAILY
Qty: 135 TABLET | Refills: 1 | Status: SHIPPED | OUTPATIENT
Start: 2025-01-31

## 2025-01-31 RX ORDER — METHYLPREDNISOLONE 4 MG/1
TABLET ORAL
Qty: 21 TABLET | Refills: 0 | Status: SHIPPED | OUTPATIENT
Start: 2025-01-31

## 2025-02-03 ENCOUNTER — OFFICE VISIT (OUTPATIENT)
Dept: SURGERY | Facility: CLINIC | Age: 55
End: 2025-02-03
Payer: COMMERCIAL

## 2025-02-03 VITALS
RESPIRATION RATE: 18 BRPM | WEIGHT: 177 LBS | HEART RATE: 81 BPM | BODY MASS INDEX: 31.36 KG/M2 | SYSTOLIC BLOOD PRESSURE: 110 MMHG | OXYGEN SATURATION: 100 % | DIASTOLIC BLOOD PRESSURE: 78 MMHG | HEIGHT: 63 IN

## 2025-02-03 DIAGNOSIS — Z48.89 POSTOPERATIVE VISIT: Primary | ICD-10-CM

## 2025-02-03 PROCEDURE — 99024 POSTOP FOLLOW-UP VISIT: CPT | Performed by: SURGERY

## 2025-02-03 NOTE — PROGRESS NOTES
"[facility]     Referring Provider: Eloise Sanz MD     Chief complaint: Postoperative visit      Subjective   HPI:   12/13/2018:  Ms. Juliette Manning is a 49 yo woman, seen at the request of Dr. Mikel Wilburn, for left axillary lymphadenopathy. She reports that over 10 years ago, she noticed some \"swelling\" in her left armpit. She was told at that time that she had accessory breast tissue located there and this additional tissue has been stable throughout her life. In April 2018 she noticed a painful lump in her left armpit. A left axillary ultrasound done at that time was normal. The lump and the pain eventually went away over 2-3 weeks. Recently, about 2 weeks ago, she felt like the painful lump returned. She noticed it while she was applying deodorant. She describes the pain more like a generalized discomfort. She also reports recent weight loss and says that her bras have not been fitting her very well lately. She denies any associated skin changes or nipple discharge.     She denies any prior history of abnormal mammograms or breast biopsies. She denies any family history of breast or ovarian cancer. She was joined today in clinic by her mother. She gave consent for her mother to be present during her examination and participate in the discussion.     10/20/2022, Visit with RHONDA Melendez:  She has a personal history of RAD51C mutation, kain salpingo-oophorectomy 2021  She has a family history of ovarian cancer in her paternal aunt.  Today she presents with axillary mass and increased risk for breast cancer. She denies any breast lumps, pain, skin changes, or nipple discharge. She denies any prior history of abnormal mammograms or breast biopsies.    12/19/2024:  I initially saw Ms. Manning in 2018 for painful accessory left breast tissue.  There also was some concern about a left axillary lymph node at the time which was morphologically normal and stable in size.  Since that visit she was found to " "have a RAD51C mutation when genetic testing was done due to a family history of ovarian cancer.  She has since had oophorectomy and is being followed by Dr. Sanz. She has been undergoing high risk imaging screening due to the increased breast cancer risk associated with the mutation.  MRI in 10/2024 showed 2 new abnormal areas in the left breast.  These were biopsied and one of the sites showed a radial scar.    1/22/2025:  She underwent left breast excisional biopsy on 1/7/25. See surgery & pathology details below in oncologic history. She has been recovering well and has no complaints.     1/30/2025:  Last week the incision looked great.  Over the weekend she picked off the Dermabond because it was coming up in a couple of places and applied a \"tallow salve\" which she uses on other places on her body.  Earlier this week she began develop a rash and the breast looked more swollen.  This has progressively gotten worse.    2/3/2025, Interval History:  At her last visit I prescribed Keflex and triamcinolone ointment for allergic dermatitis.  She called me 2 days later and it had not gotten better, so I prescribed a Medrol Dosepak.  It has been getting better over the past few days.       Breast history/imaging (updated 2/3/2025):     1/17/18, Screening MMG ( [facility]): Scattered fibroglandular densities. No discrete masses or stellate lesions or suspicious calcifications.  BIRADS: 1 Negative    5/17/18, Left Breast US ( [facility]): In the left axilla there is an echogenic nodule measuring approximately 6 mm in greatest dimension. This is probably a small echogenic lymph node. No sonographic evidence of malignancy. No suspicious masses or other abnormalities are seen.  (This measurement actually refers to the hilum)     12/12/18, Bilateral diagnostic MMG with [facility] & Left US ( [facility]):   MMG - Scattered fibroglandular density. There are no findings to suggest malignancy.  US - The area of concern corresponds to the left " axilla where there are several small lymph nodes. Ultrasound also confirms the presence of a lymph node in the area of concern measuring 1.3 x 2 cm and having normal morphology. It appears similar to the previous ultrasound exam dated 05/17/2018. No other abnormality is seen.  IMPRESSION - The lymph node has normal morphology and appears unchanged from 05/17/2018.   BIRADS: 2 Benign     12/13/2019, Screening MMG with [facility] ( [facility]):  There are scattered fibroglandular densities. No dominant mass, areas of architectural distortion or skin thickening. There is no evidence for axillary lymphadenopathy, nipple retraction nor suspicious  calcifications. Tomosynthesis utilized.  BI-RADS 1: Negative     4/16/2021, Bilateral Screening MMG with [facility] ( [facility]):  There are scattered areas of fibroglandular density.   There are no suspicious masses, calcifications, or areas of architectural distortion.  BI-RADS 1: Negative     9/28/2021, Bilateral Breast MRI ( [facility]):  Scattered fibroglandular tissue is seen throughout both breasts. Mild background parenchymal enhancement of both breasts is noted. A moderate degree of scattered stippled foci of variable enhancement is seen throughout both breasts, none of which appear dominant or clumped. I see no areas of abnormal enhancement or morphology in either breast. There is no evidence for abnormal skin,  nipple or chest wall enhancement of either breast and there is no evidence for axillary or internal mammary chain adenopathy.   BI-RADS  2: Benign findings     5/5/2022, Screening MMG with [facility] ( [facility]):  There are scattered areas of fibroglandular density.   There are no suspicious masses, calcifications, or areas of architectural distortion.  BI-RADS 1: Negative     10/3/2022, Bilateral Breast MRI ( [facility]):  Scattered fibroglandular tissue is seen throughout both breasts. Mild background parenchymal enhancement of both breasts is noted. I see no areas of suspicious enhancement or  morphology in either  breast. There is no evidence for abnormal skin, nipple or chest wall enhancement of either breast and I see no evidence for axillary or internal mammary chain adenopathy.  BI-RADS 1: Negative.     5/12/2023, Screening MMG with [facility] ( [facility]):  Scattered fibroglandular densities are seen throughout both breasts in a pattern which is unchanged. I see no new or dominant masses, areas of architectural distortion or skin thickening. There is no evidence for axillary lymphadenopathy or nipple retraction.  BI-RADS 1: Negative.     10/4/2023, Bilateral Breast MRI ( [facility]):  RIGHT BREAST:    No suspicious enhancing mass or area of non-mass enhancement is identified. The visualized axilla is within normal limits.  LEFT BREAST:    No suspicious enhancing mass or area of non-mass enhancement is identified.  The visualized axilla is within normal limits.   EXTRAMAMMARY FINDINGS:  There are no pathologically enlarged internal mammary chain lymph nodes on either side.     BI-RADS 1: Negative     5/15/2024, Bilateral Screening MMG with [facility] ( [facility]):  There are scattered areas of fibroglandular density.   There are no suspicious masses, calcifications, or areas of architectural distortion.   BI-RADS  1: Negative     10/24/2024, Bilateral Breast MRI ( [facility]):  RIGHT BREAST:    No suspicious enhancing mass or area of non-mass enhancement is identified.  The visualized axilla is within normal limits.  LEFT BREAST:    At 12:00 in the middle left breast, 6 cm posterior to the nipple, there is 2 x 0.9 x 1 cm non-mass enhancement, which is suspicious.  At 12:00 in the middle to posterior left breast, 6.5 cm posterior to the  nipple, there is a 0.9 x 0.5 x 1 cm non-mass enhancement, which is suspicious.  The visualized axilla is within normal limits.   EXTRAMAMMARY FINDINGS:  There are no pathologically enlarged internal mammary chain lymph nodes on either side.     BI-RADS 4: Suspicious      11/11/2024, Left Breast,  [facility]  1.  Breast, left middle 12 o'clock position site A, MRI guided core biopsy: (Stoplight clip)               A.  Clustered apocrine cysts with focal associated calcium oxalate crystals (microcalcifications).  2.  Breast, left posterior 12 o'clock position site B, MRI-guided core biopsy: (Infinity clip)  A.  Radial scar, measuring 2.5 mm and involving 1 core with adjacent clustered microcysts with columnar cell change, apocrine metaplasia and mild ductal hyperplasia of the usual type.  -Post-procedural digital orthogonal mammographic views of the left breast demonstrate the stoplight clip at the expected location at the site of biopsy in the upper central middle left breast. The Infinity clip is laterally displaced by approximately 1 cm.    1/7/2025, Left breast needle-localized excisional biopsy:  1.  Left breast, excisional biopsy:         A.  Residual radial scar with usual ductal hyperplasia, apocrine cysts, micropapillomas, and clustered microcysts   with columnar cell change.         B.  Negative for atypical hyperplasia or carcinoma.         C.  Clip and biopsy site changes present.      Review of Systems:  See interval history.       Medications:    Current Outpatient Medications:     acyclovir (ZOVIRAX) 400 MG tablet, TAKE 2 TABLETS THREE TIMES A DAY UNTIL GONE (Patient taking differently: Take 1 tablet by mouth 3 (Three) Times a Day As Needed.), Disp: 30 tablet, Rfl: 2    albuterol sulfate  (90 Base) MCG/ACT inhaler, Inhale 2 puffs Every 4 (Four) Hours As Needed for Wheezing., Disp: 6.7 g, Rfl: 3    azelastine (ASTELIN) 0.1 % nasal spray, 2 SPRAY IN EACH NOSTRIL DAILY AS DIRECTED, Disp: 30 mL, Rfl: 3    calcium polycarbophil (FiberCon) 625 MG tablet, , Disp: , Rfl:     cephalexin (KEFLEX) 500 MG capsule, Take 1 capsule by mouth 4 (Four) Times a Day for 7 days., Disp: 28 capsule, Rfl: 0    Clindamycin Phos-Benzoyl Perox 1.2-5 % gel, Apply 1 application  topically to the  appropriate area as directed As Needed., Disp: , Rfl: 5    EPINEPHrine (EPIPEN 2-ELPIDIO IJ), Inject  as directed., Disp: , Rfl:     escitalopram (LEXAPRO) 10 MG tablet, TAKE 1 AND 1/2 TABLETS BY MOUTH DAILY, Disp: 135 tablet, Rfl: 1    Flaxseed, Linseed, (FLAX SEED OIL PO), Take 1 tablet by mouth Daily. PT TO HOLD FOR SURGERY, Disp: , Rfl:     Loratadine (CLARITIN PO), Take 10 mg by mouth Daily., Disp: , Rfl:     LYSINE PO, Take 1,000 mg by mouth Daily. HOLD PRIOR TO SURG, Disp: , Rfl:     methylPREDNISolone (MEDROL) 4 MG dose pack, Take as directed on package instructions., Disp: 21 tablet, Rfl: 0    multivitamin with minerals tablet tablet, Take 1 tablet by mouth Daily. HOLD PRIOR TO SURG, Disp: , Rfl:     polyethylene glycol (MIRALAX) packet, Take 17 g by mouth As Needed. 3 times a week, Disp: , Rfl:     tretinoin (RETIN-A) 0.05 % cream, Apply 1 Application topically to the appropriate area as directed Every Night., Disp: , Rfl: 5    Triamcinolone Acetonide (NASACORT) 55 MCG/ACT nasal inhaler, Administer 2 sprays into the nostril(s) as directed by provider Daily., Disp: , Rfl:     Turmeric 500 MG capsule, Take 1 capsule by mouth Daily. HOLD PRIOR TO SURG, Disp: , Rfl:       Allergies   Allergen Reactions    Ampicillin Hives       Family History   Problem Relation Age of Onset    Atrial fibrillation Mother     GARFIELD disease Mother     Irritable bowel syndrome Mother     Melanoma Mother     Diabetes Father     Kidney disease Father     Seizures Sister     ADD / ADHD Sister     ADD / ADHD Son     Ovarian cancer Paternal Aunt     Cancer Paternal Aunt         Ovarian Cancer    Cancer Paternal Uncle         Bladder cancer    Diabetes Cousin     Malig Hyperthermia Neg Hx     Breast cancer Neg Hx        Objective   PHYSICAL EXAMINATION:   Vitals:    02/03/25 1256   BP: 110/78   Pulse: 81   Resp: 18   SpO2: 100%     ECOG 0 - Asymptomatic  General: NAD, well appearing  Psych: a&o x 3, normal mood and affect  Eyes: EOMI, no  scleral icterus  ENMT: neck supple without masses or thyromegaly, mucus membranes moist  MSK: normal gait, normal ROM in bilateral shoulders  Lymph nodes: Increased soft tissue in the left axilla versus the right; no cervical, supraclavicular or axillary lymphadenopathy  Breast: moderate size, grade 1 ptosis, left slightly larger than right  Right: deferred.  Left: Well-healing lateral periareolar incision.  Improved allergic dermatitis.      Assessment & Plan   Assessment:   1. 55 y.o. F sp left breast excisional biopsy of a radial scar on 1/7/2025 with benign final pathology.  2.  She has an increased lifetime risk of breast cancer associated with a RAD51C pathogenic mutation.  -Improving allergic dermatitis on Medrol Dosepak.     Plan:  -Follow-up in 2 weeks for recheck.    Carla Taylor MD

## 2025-02-18 ENCOUNTER — TELEPHONE (OUTPATIENT)
Dept: SURGERY | Facility: CLINIC | Age: 55
End: 2025-02-18
Payer: COMMERCIAL

## 2025-02-18 NOTE — TELEPHONE ENCOUNTER
Called patient and  her rash has improved a lot and her surgical site is healing well. She will follow up in May with CARMENCITA Mendiola.

## 2025-03-19 ENCOUNTER — COMPREHENSIVE EXAM (OUTPATIENT)
Age: 55
End: 2025-03-19

## 2025-03-19 DIAGNOSIS — H43.813: ICD-10-CM

## 2025-03-19 DIAGNOSIS — Z79.899: ICD-10-CM

## 2025-03-19 DIAGNOSIS — H01.024: ICD-10-CM

## 2025-03-19 DIAGNOSIS — H01.021: ICD-10-CM

## 2025-03-19 DIAGNOSIS — H25.13: ICD-10-CM

## 2025-03-19 PROCEDURE — 99214 OFFICE O/P EST MOD 30 MIN: CPT

## 2025-03-19 PROCEDURE — 92134 CPTRZ OPH DX IMG PST SGM RTA: CPT

## 2025-03-19 RX ORDER — NEOMYCIN SULFATE, POLYMYXIN B SULFATE AND DEXAMETHASONE 3.5; 10000; 1 MG/G; [USP'U]/G; MG/G: OINTMENT OPHTHALMIC TWICE A DAY

## 2025-04-25 ENCOUNTER — OFFICE VISIT (OUTPATIENT)
Dept: INTERNAL MEDICINE | Age: 55
End: 2025-04-25
Payer: COMMERCIAL

## 2025-04-25 VITALS
OXYGEN SATURATION: 100 % | BODY MASS INDEX: 31.11 KG/M2 | HEIGHT: 63 IN | SYSTOLIC BLOOD PRESSURE: 106 MMHG | WEIGHT: 175.6 LBS | HEART RATE: 84 BPM | DIASTOLIC BLOOD PRESSURE: 80 MMHG | TEMPERATURE: 98.9 F

## 2025-04-25 DIAGNOSIS — J30.9 ALLERGIC RHINITIS, UNSPECIFIED SEASONALITY, UNSPECIFIED TRIGGER: ICD-10-CM

## 2025-04-25 DIAGNOSIS — F41.1 GENERALIZED ANXIETY DISORDER: Chronic | ICD-10-CM

## 2025-04-25 DIAGNOSIS — E78.5 HYPERLIPIDEMIA, UNSPECIFIED HYPERLIPIDEMIA TYPE: ICD-10-CM

## 2025-04-25 DIAGNOSIS — Z00.00 ENCOUNTER FOR ANNUAL PHYSICAL EXAM: Primary | ICD-10-CM

## 2025-04-25 PROCEDURE — 99396 PREV VISIT EST AGE 40-64: CPT | Performed by: NURSE PRACTITIONER

## 2025-04-25 RX ORDER — ESCITALOPRAM OXALATE 10 MG/1
15 TABLET ORAL DAILY
Qty: 90 TABLET | Refills: 3 | Status: SHIPPED | OUTPATIENT
Start: 2025-04-25

## 2025-04-25 NOTE — PROGRESS NOTES
"                     S K Y L E R    F R Y E ,   D N P ,  A P R N                  I  N  T  E  R  N  A  L    M  E  D  I  C  I  N  E       ENCOUNTER DATE:  04/25/2025    Juliette Manning / 55 y.o. / female    ANNUAL PREVENTATIVE / PHYSICAL ENCOUNTER       CHIEF COMPLAINT     Annual Exam      VITALS     Vitals:    04/25/25 0928   BP: 106/80   BP Location: Left arm   Patient Position: Sitting   Cuff Size: Adult   Pulse: 84   Temp: 98.9 °F (37.2 °C)   TempSrc: Oral   SpO2: 100%   Weight: 79.7 kg (175 lb 9.6 oz)   Height: 160 cm (62.99\")       BP Readings from Last 3 Encounters:   04/25/25 106/80   02/03/25 110/78   01/30/25 112/74     Wt Readings from Last 3 Encounters:   04/25/25 79.7 kg (175 lb 9.6 oz)   02/03/25 80.3 kg (177 lb)   01/30/25 80.3 kg (177 lb)      Body mass index is 31.11 kg/m².      MEDICATIONS     Current Outpatient Medications on File Prior to Visit   Medication Sig Dispense Refill    acyclovir (ZOVIRAX) 400 MG tablet TAKE 2 TABLETS THREE TIMES A DAY UNTIL GONE 30 tablet 2    albuterol sulfate  (90 Base) MCG/ACT inhaler Inhale 2 puffs Every 4 (Four) Hours As Needed for Wheezing. 6.7 g 3    azelastine (ASTELIN) 0.1 % nasal spray 2 SPRAY IN EACH NOSTRIL DAILY AS DIRECTED 30 mL 3    calcium polycarbophil (FiberCon) 625 MG tablet       Clindamycin Phos-Benzoyl Perox 1.2-5 % gel Apply 1 application  topically to the appropriate area as directed As Needed.  5    Flaxseed, Linseed, (FLAX SEED OIL PO) Take 1 tablet by mouth Daily. PT TO HOLD FOR SURGERY      Loratadine (CLARITIN PO) Take 10 mg by mouth Daily.      LYSINE PO Take 1,000 mg by mouth Daily. HOLD PRIOR TO SURG      multivitamin with minerals tablet tablet Take 1 tablet by mouth Daily. HOLD PRIOR TO SURG      tretinoin (RETIN-A) 0.05 % cream Apply 1 Application topically to the appropriate area as directed Every Night.  5    Triamcinolone Acetonide (NASACORT) 55 MCG/ACT nasal inhaler Administer 2 sprays into the nostril(s) as directed by " Report received from MONTSERRAT Topete. Pt transferred to room 421 in stable condition by this RN.    provider Daily.      Turmeric 500 MG capsule Take 1 capsule by mouth Daily. HOLD PRIOR TO SURG      [DISCONTINUED] escitalopram (LEXAPRO) 10 MG tablet TAKE 1 AND 1/2 TABLETS BY MOUTH DAILY 135 tablet 1    [DISCONTINUED] methylPREDNISolone (MEDROL) 4 MG dose pack Take as directed on package instructions. 21 tablet 0    [DISCONTINUED] polyethylene glycol (MIRALAX) packet Take 17 g by mouth As Needed. 3 times a week      [DISCONTINUED] EPINEPHrine (EPIPEN 2-ELPIDIO IJ) Inject  as directed. (Patient not taking: Reported on 4/25/2025)       No current facility-administered medications on file prior to visit.         HISTORY OF PRESENT ILLNESS     Juliette presents for annual health maintenance visit.    Significant improvement in anxiety would like to decrease Lexapro from 1-1/2 tablets to Lexapro 10 mg daily.  No thoughts of suicide self-harm or panic attack.     Hyperlipidemia triglycerides have normalized and LDL has decreased with improvement in diet and exercise.  ASCVD risk score 1.1%.    Patient is followed by hematology oncology with high risk for breast cancer.  Maternal aunt recently diagnosed with ovarian cancer at 84.  Sister who is 48 diagnosed with precancerous polyps.  Maternal grandmother history of multiple myeloma and 40s.  Mother history of melanoma.  Palpable abnormality in the left axilla in 2018 investigated with mammogram and ultrasound and determined to be benign.  Seen by Dr. Taylor at that time routine mammograms ever since which have been benign.  Hormone replacement therapy for history 5 years.  Paternal aunt positive for 5 and 1C pathogenic mutation.  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  Bilateral salpingo-oophorectomy May 3, 2021 pathology benign.  Screening MRI September 2021 benign.  Last mammogram completed in 2023 without evidence of malignancy.  MRI repeat planned for October 2024 and has been referred to genetic counseling.  Hot flashes post  hysterectomy which she used EstroGel for a brief period and is now stopped.  Hematology oncology advising did not use hormone replacement due to increased risk of breast cancer with mutation.     Last colonoscopy December 2019 diagnosis of hemorrhoids, recall 10 years.  Completed by Dr. Murillo. IBS-C. Miralax 3-4 days weekly. Fiber supplement.      September annual with Dr. Wilburn OB/GYN, benign.     No longer seeing her allergist, rescue inhaler along with nasal sprays or provided through primary care.     Dermatology Dr. Siddiqui, followed for acne, clindamycin topical.     Patient Care Team:  Ole Bettencourt, DNP, APRN as PCP - General (Internal Medicine)  Mikel Wilburn MD as Referring Physician (Obstetrics and Gynecology)  Eloise Sanz MD as Consulting Physician (Hematology and Oncology)  Mikel Moran MD as Consulting Physician (Allergy and Immunology)  Alena King MD (Dermatology)    General health: good  Lifestyle:  Attempting to lose weight?: Yes   Diet: eats a well balanced, healthy diet  Exercise: generally stays active (work/exercise)  Tobacco: Never used   Alcohol: occasional/infrequent  Work: Full-time  Reproductive health:  Sexually active?: Yes   Sexual problems?: No problems  Concern for STD?: No    Sees Gynecologist?: No   Maura/Postmenopausal?: Yes   Depression Screening:        PHQ-2 Depression Screening  Little interest or pleasure in doing things?     Feeling down, depressed, or hopeless?     PHQ-2 Total Score        PHQ-2: 0 (Not depressed)   PHQ-9: 0 (Negative screening for depression)    ALLERGIES  Allergies   Allergen Reactions    Ampicillin Hives        PFSH:     The following portions of the patient's history were reviewed and updated as appropriate: Allergies / Current Medications / Past Medical History / Surgical History / Social History / Family History    PROBLEM LIST   Patient Active Problem List   Diagnosis    Asthma    Acne    Allergic rhinitis    IBS (irritable bowel  syndrome)    Anxiety disorder    Accessory breast tissue of axilla    Breast pain    Urticaria    Atopy    At high risk for breast cancer    Cervical radiculopathy at C7    Cervical spondylosis without myelopathy    Radial scar of left breast       PAST MEDICAL HISTORY  Past Medical History:   Diagnosis Date    Acne     Anxiety     Arthritis     Asthma     R/T ALLERGIES    Genetic testing of female     RAD51C POSITIVE    IBS (irritable bowel syndrome)     Multiple gestation 02    Neck pain     CERVICAL BULDGING DISC    Radial scar of left breast        SURGICAL HISTORY  Past Surgical History:   Procedure Laterality Date    BREAST BIOPSY Left 2025    Procedure: Left breast needle-localized excisional biopsy (localize original site associated with infinity clip);  Surgeon: Carla Taylor MD;  Location: [facility] MAIN OR;  Service: General;  Laterality: Left;    BREAST BIOPSY  2024    Left side     SECTION      x2    COLONOSCOPY N/A 2019    Procedure: COLONOSCOPY into cecum and terminal ileum;  Surgeon: Judd Murillo MD;  Location: [facility] ENDOSCOPY;  Service: Gastroenterology    DIAGNOSTIC LAPAROSCOPY Bilateral 2021    Procedure: DIAGNOSTIC LAPAROSCOPY, SALPINGO OOPHORECTOMY LAPAROSCOPIC;  Surgeon: Mikel Wilburn MD;  Location: [facility] OR OSC;  Service: Obstetrics/Gynecology;  Laterality: Bilateral;    EYE SURGERY      lasik    LASIK      SKIN LESION EXCISION      Bilat. shoulder and left inner lower leg    TUBAL ABDOMINAL LIGATION      WISDOM TOOTH EXTRACTION         SOCIAL HISTORY  Social History     Socioeconomic History    Marital status:      Spouse name: Rogelio    Number of children: 2   Tobacco Use    Smoking status: Never    Smokeless tobacco: Never   Vaping Use    Vaping status: Never Used   Substance and Sexual Activity    Alcohol use: Yes     Alcohol/week: 2.0 - 3.0 standard drinks of alcohol     Types: 2 - 3 Glasses of wine per week     Comment:  weekends    Drug use: No    Sexual activity: Yes     Partners: Male     Birth control/protection: None, Tubal ligation       FAMILY HISTORY  Family History   Problem Relation Age of Onset    Atrial fibrillation Mother     GARFIELD disease Mother     Irritable bowel syndrome Mother     Melanoma Mother     Skin cancer Mother         melanoma    Diabetes Father     Kidney disease Father     Seizures Sister     ADD / ADHD Sister     ADD / ADHD Son     Ovarian cancer Paternal Aunt     Cancer Paternal Aunt         Ovarian Cancer    Cancer Paternal Uncle         Bladder cancer    Diabetes Cousin     Malig Hyperthermia Neg Hx     Breast cancer Neg Hx        IMMUNIZATION HISTORY  Immunization History   Administered Date(s) Administered    COVID-19 (PFIZER) Purple Cap Monovalent 05/18/2021, 09/28/2021    Fluzone  >6mos 10/24/2019    Fluzone (or Fluarix & Flulaval for VFC) >6mos 09/29/2018, 10/24/2019, 10/03/2020, 09/18/2021, 10/28/2022, 11/09/2023    Fluzone Quad >6mos (Multi-dose) 10/31/2016, 10/24/2019    Hepatitis A 05/02/2018    Influenza, Unspecified 12/16/2024    Pneumococcal Conjugate 13-Valent (PCV13) 10/03/2020    Pneumococcal Conjugate 20-Valent (PCV20) 04/23/2024    Shingrix 08/23/2021, 10/28/2022    Tdap 04/23/2024         REVIEW OF SYSTEMS     Review of Systems  Constitutional: Negative.    HENT:  Positive for congestion (controlled).    Eyes: Negative.    Respiratory: Negative.     Cardiovascular: Negative.    Gastrointestinal: Negative.    Endocrine: Negative.    Genitourinary: Negative.    Musculoskeletal: Negative.    Skin: Negative.    Allergic/Immunologic: Negative.    Neurological: Negative.    Hematological: Negative.    Psychiatric/Behavioral:  The patient is nervous/anxious (controlled).      PHYSICAL EXAMINATION     Physical Exam  Constitutional:       Appearance: Normal appearance.   HENT:      Head: Normocephalic and atraumatic.      Right Ear: Tympanic membrane, ear canal and external ear normal.       Left Ear: Tympanic membrane, ear canal and external ear normal.      Nose: Nose normal. No congestion.      Mouth/Throat:      Mouth: Mucous membranes are moist.      Pharynx: Oropharynx is clear.   Eyes:      Conjunctiva/sclera: Conjunctivae normal.      Pupils: Pupils are equal, round, and reactive to light.   Neck:      Thyroid: No thyromegaly.      Vascular: No carotid bruit.   Cardiovascular:      Rate and Rhythm: Normal rate and regular rhythm.      Pulses: Normal pulses.      Heart sounds: Normal heart sounds.   Pulmonary:      Effort: Pulmonary effort is normal.      Breath sounds: Normal breath sounds.   Abdominal:      General: Abdomen is flat. Bowel sounds are normal.      Palpations: Abdomen is soft.      Tenderness: There is no right CVA tenderness or left CVA tenderness.   Genitourinary:     Comments: Followed by OB/GYN  Musculoskeletal:         General: Normal range of motion.      Cervical back: Normal range of motion.      Right lower leg: No edema.      Left lower leg: No edema.   Lymphadenopathy:      Cervical: No cervical adenopathy.   Skin:     General: Skin is warm and dry.   Neurological:      Mental Status: She is alert and oriented to person, place, and time.      Cranial Nerves: No cranial nerve deficit.      Motor: No weakness.      Gait: Gait normal.      Deep Tendon Reflexes:      Reflex Scores:       Patellar reflexes are 2+ on the right side and 2+ on the left side.  Psychiatric:         Mood and Affect: Mood normal.         Thought Content: Thought content normal.         Judgment: Judgment normal.     REVIEWED DATA      Labs:    Lab Results   Component Value Date     04/18/2025    K 4.9 04/18/2025    CALCIUM 10.4 04/18/2025    AST 20 04/18/2025    ALT 22 04/18/2025    BUN 16 04/18/2025    CREATININE 0.85 04/18/2025    CREATININE 0.89 01/02/2025    CREATININE 1.00 11/11/2024    EGFRIFNONA 55 (L) 10/05/2021    EGFRIFAFRI 71 08/16/2021     Lab Results   Component Value Date     "GLUCOSE 89 04/18/2025    HGBA1C 5.60 04/18/2025    HGBA1C 5.50 04/23/2024    HGBA1C 5.7 (H) 08/16/2022    TSH 1.590 04/18/2025    FREET4 1.18 04/18/2025     Lab Results   Component Value Date     (H) 04/18/2025    HDL 77 (H) 04/18/2025    TRIG 97 04/18/2025    CHOLHDLRATIO 2.92 04/18/2025   No results found for: \"[id number]\"   Lab Results   Component Value Date    WBC 6.69 04/18/2025    HGB 14.3 04/18/2025    MCV 93.4 04/18/2025     04/18/2025     Lab Results   Component Value Date    PROTEIN Negative 04/18/2025    GLUCOSEU Negative 04/18/2025    BLOODU Trace (A) 04/18/2025    NITRITEU Negative 04/18/2025    LEUKOCYTESUR 2+ (A) 04/18/2025     Lab Results   Component Value Date    HEPCVIRUSABY <0.1 08/16/2021       Imaging:                Medical Tests:              Summary of old records / correspondence / consultant report:               Request outside records:         ASSESSMENT & PLAN     ANNUAL WELLNESS EXAM / PHYSICAL     Other medical problems addressed today:  Problem List Items Addressed This Visit       Allergic rhinitis    Anxiety disorder (Chronic)    Relevant Medications    escitalopram (LEXAPRO) 10 MG tablet     Other Visit Diagnoses         Encounter for annual physical exam    -  Primary      Hyperlipidemia, unspecified hyperlipidemia type                 Summary/Discussion:     Patient up-to-date on colon cancer screening.  Would like to decrease Lexapro which I think is acceptable current mood, decreased from 1-1/2 tablets to 10 mg on 1 tablet daily.  Continue improvement in healthy diet and exercise for hyperlipidemia.    Next Appointment with me: Visit date not found    Return in about 1 year (around 4/25/2026) for Annual physical with labs one week prior .      HEALTHCARE MAINTENANCE ISSUES     Cancer Screening:  Colon: Initial/Next screening at age: CURRENT  Repeat colon cancer screening: every 10 years  Breast: Recommended monthly self exams; annual professional exam  Mammogram: " every 1 year  Cervical: pelvic exam as recommended by gyne  Skin: Monthly self skin examination, annual exam by health professional  Lung: Does not meet criteria for lung cancer screening.   Other:    Screening Labs & Tests:  Lab results reviewed & discussed with the patient or test orders placed today.  EKG:  CV Screening: Lipid panel  DEXA (65+ or postmenopausal with risk factors):   HEP C (If born [birth year], or risk factors): Negative screen  Other:     Immunization/Vaccinations (to be given today unless deferred by patient)  Influenza: Patient had the flu shot this season  Hepatitis A: Verify immunization records  Hepatitis B: Verify immunization records  Tetanus/Pertussis: Up to date  Pneumococcal: Up to date  Shingles: Up to date  COVID: Does not plan to get the latest booster  RSV: Not indicated    Lifestyle Counseling:  Lifestyle Modifications: Continue good lifestyle choices/modifications  Safety Issues: Always wear seatbelt, Avoid texting while driving   Use sunscreen, regular skin examination  Recommended annual dental/vision examination.  Emotional/Stress/Sleep: Reviewed and  given when appropriate      Health Maintenance   Topic Date Due    COVID-19 Vaccine (3 - 2024-25 season) 10/25/2025 (Originally 9/1/2024)    INFLUENZA VACCINE  07/01/2025    ANNUAL PHYSICAL  12/16/2025    LIPID PANEL  04/18/2026    MAMMOGRAM  11/11/2026    PAP SMEAR  12/17/2027    COLORECTAL CANCER SCREENING  12/20/2029    TDAP/TD VACCINES (2 - Td or Tdap) 04/23/2034    HEPATITIS C SCREENING  Completed    Pneumococcal Vaccine 50+  Completed    ZOSTER VACCINE  Completed

## 2025-05-16 ENCOUNTER — HOSPITAL ENCOUNTER (OUTPATIENT)
Dept: MAMMOGRAPHY | Facility: HOSPITAL | Age: 55
Discharge: HOME | End: 2025-05-16
Admitting: INTERNAL MEDICINE
Payer: COMMERCIAL

## 2025-05-16 DIAGNOSIS — Z12.31 SCREENING MAMMOGRAM, ENCOUNTER FOR: ICD-10-CM

## 2025-05-16 PROCEDURE — 77067 SCR MAMMO BI INCL CAD: CPT

## 2025-05-16 PROCEDURE — 77063 BREAST TOMOSYNTHESIS BI: CPT

## 2025-05-21 ENCOUNTER — OFFICE VISIT (OUTPATIENT)
Dept: ONCOLOGY | Facility: CLINIC | Age: 55
End: 2025-05-21
Payer: COMMERCIAL

## 2025-05-21 VITALS
WEIGHT: 173.4 LBS | BODY MASS INDEX: 30.72 KG/M2 | TEMPERATURE: 97.8 F | OXYGEN SATURATION: 98 % | DIASTOLIC BLOOD PRESSURE: 78 MMHG | HEIGHT: 63 IN | SYSTOLIC BLOOD PRESSURE: 115 MMHG | HEART RATE: 79 BPM

## 2025-05-21 DIAGNOSIS — Z91.89 AT HIGH RISK FOR BREAST CANCER: Primary | ICD-10-CM

## 2025-05-21 NOTE — PROGRESS NOTES
Subjective   Juliette Manning is a 55 y.o. female.  Referred by Dr. 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  She had a paternal aunt who had recently been diagnosed with ovarian cancer.  Her aunt was 84 at the time of diagnosis.  Patient is unsure if her aunt underwent genetic testing but this raised the concern for a possible BRCA1 or 2 mutation in the family.  In terms of other family history her sister who is 48 years old has been diagnosed with precancerous polyps but has not had diagnosis of colon cancer.  Her maternal grandmother had history of either multiple myeloma  in her 40s.  Mother has history of melanoma.  Ms. Manning herself had a palpable abnormality in the left axilla in 2018 which was investigated with a mammogram and an ultrasound and this was further determined to be benign.  She was seen by Dr. Taylor at that time.  She has had routine mammograms ever since.  She denies any new palpable abnormalities of the breast.  She has gained some weight following menopause.  She was on hormone replacement therapy for 5 years.  She reports drinking 1 glass of wine every night.  She exercises about 30 to 40 minutes every day 3 to 4 days a week.  She reports consuming a healthy diet.  Genetic testing of her paternal aunt has been obtained and reviewed and she was noted to be positive for rad 51C pathogenic mutation.  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    She underwent a bilateral salpingo-oophorectomy on 5/3/2021 and pathology was benign.    Screening MRI performed 9/28/2021 -benign    10/24/2024-bilateral breast MRI-right breast without any suspicious abnormalities  Left breast at 12:00 in the mid left breast, 6 cm posterior to the nipple there is a  2 x 0.9 x 1 cm non-mass enhancement which is suspicious.  At 12:00 in the middle to posterior left breast, 6.5 cm posterior to the nipple there is a 0.9 x 0.5 x 1 cm non-mass enhancement which is suspicious.  Axilla is within normal limits.    2024-MRI guided biopsy of the left breast 12 o'clock position abnormalities  1.left breast 12:00 site A-clustered apocrine cyst with focal associated calcium oxalate crystals.  2.left breast 12:00 site B-radial scar, measuring 2.5 mm involving 1 core with adjacent clustered microcysts and coronary cell change, apocrine metaplasia and mild ductal hyperplasia of the usual type.    Interval history:   Juliette Manning is a 55 y.o. female the above-mentioned history who returns today for 6-month follow-up.  Did do excisional biopsy of the left breast radial scar with pathology unremarkable.  She is she denies any new pain or ongoing concerns.      The following portions of the patient's history were reviewed and updated as appropriate: allergies, current medications, past family history, past medical history, past social history, past surgical history and problem list.    Past Medical History:   Diagnosis Date    Acne     Anxiety     Arthritis     Asthma     R/T ALLERGIES    Genetic testing of female     RAD51C POSITIVE    IBS (irritable bowel syndrome)     Multiple gestation 02    Neck pain     CERVICAL BULDGING DISC    Radial scar of left breast         Past Surgical History:   Procedure Laterality Date    BREAST BIOPSY Left 2025    Procedure: Left breast needle-localized excisional biopsy (localize original site associated with infinity clip);  Surgeon: Carla Taylor MD;  Location: [facility];  Service: General;  Laterality: Left;    BREAST BIOPSY  2024    Left side    BREAST CYST EXCISION       SECTION      x2    COLONOSCOPY N/A 2019    Procedure: COLONOSCOPY into cecum and terminal ileum;  Surgeon: Judd Murillo MD;   Location: [facility] ENDOSCOPY;  Service: Gastroenterology    DIAGNOSTIC LAPAROSCOPY Bilateral 2021    Procedure: DIAGNOSTIC LAPAROSCOPY, SALPINGO OOPHORECTOMY LAPAROSCOPIC;  Surgeon: Mikel Wilburn MD;  Location: [facility] OR [facility] [place];  Service: Obstetrics/Gynecology;  Laterality: Bilateral;    EYE SURGERY      lasik    LASIK      SKIN LESION EXCISION      Bilat. shoulder and left inner lower leg    TUBAL ABDOMINAL LIGATION      WISDOM TOOTH EXTRACTION          Family History   Problem Relation Age of Onset    Atrial fibrillation Mother     GARFIEDL disease Mother     Irritable bowel syndrome Mother     Melanoma Mother     Skin cancer Mother         melanoma    Diabetes Father     Kidney disease Father     Seizures Sister     ADD / ADHD Sister     ADD / ADHD Son     Ovarian cancer Paternal Aunt     Cancer Paternal Aunt         Ovarian Cancer    Cancer Paternal Uncle         Bladder cancer    Diabetes Cousin     Malig Hyperthermia Neg Hx     Breast cancer Neg Hx    Mother with history of melanoma  Maternal grandmother history of multiple myeloma or breast cancer, unclear  Sister-at age 48 precancerous polyps  Paternal aunt with cervical cancer  Another paternal aunt with ovarian cancer the age of 84  Paternal uncle with history of bladder cancer in his 60s    Social History     Socioeconomic History    Marital status:      Spouse name: Rogelio    Number of children: 2   Tobacco Use    Smoking status: Never    Smokeless tobacco: Never   Vaping Use    Vaping status: Never Used   Substance and Sexual Activity    Alcohol use: Yes     Alcohol/week: 2.0 - 3.0 standard drinks of alcohol     Types: 2 - 3 Glasses of wine per week     Comment: weekends    Drug use: No    Sexual activity: Yes     Partners: Male     Birth control/protection: None, Tubal ligation        OB History          2    Para   2    Term   2            AB        Living             SAB        IAB        Ectopic        Molar        Multiple      "   Live Births   2            Age at menarche-12  Age of first live childbirth-32  No breast-feeding  HRT use 4 to 5 years, stopped after her last visit with me      Allergies   Allergen Reactions    Ampicillin Hives        Review of systems as mentioned in the HPI    Objective   Blood pressure 115/78, pulse 79, temperature 97.8 °F (36.6 °C), temperature source Oral, height 160 cm (62.99\"), weight 78.7 kg (173 lb 6.4 oz), SpO2 98%, not currently breastfeeding.   Physical Exam  Constitutional:       General: She is not in acute distress.     Appearance: Normal appearance. She is normal weight. She is not ill-appearing.   HENT:      Mouth/Throat:      Mouth: Mucous membranes are moist.   Eyes:      Extraocular Movements: Extraocular movements intact.      Pupils: Pupils are equal, round, and reactive to light.   Neck:      Vascular: No carotid bruit.   Cardiovascular:      Rate and Rhythm: Normal rate and regular rhythm.      Pulses: Normal pulses.      Heart sounds: Normal heart sounds. No murmur heard.     No gallop.   Pulmonary:      Effort: Pulmonary effort is normal. No respiratory distress.      Breath sounds: Normal breath sounds. No stridor. No wheezing, rhonchi or rales.   Chest:      Chest wall: No tenderness.   Abdominal:      General: Abdomen is flat. Bowel sounds are normal.      Palpations: Abdomen is soft.   Musculoskeletal:         General: Normal range of motion.      Cervical back: Normal range of motion and neck supple. No rigidity. No muscular tenderness.   Lymphadenopathy:      Cervical: No cervical adenopathy.   Skin:     General: Skin is warm and dry.      Findings: No bruising.   Neurological:      General: No focal deficit present.      Mental Status: She is alert and oriented to person, place, and time.      Cranial Nerves: No cranial nerve deficit.   Psychiatric:         Mood and Affect: Mood normal.         Behavior: Behavior normal.       Breast exam: Patient declined exam today (mammogram " last week). Previously-right breast appears normal inspection.  No palpable abnormalities of the right breast.  Left breast appears normal inspection.  No palpable abnormalities of the left breast-scar line palpated today with no new concerns    I have reexamined the patient and the results are consistent with the previously documented exam except as updated. RHONDA Khoury        No visits with results within 30 Day(s) from this visit.   Latest known visit with results is:   Results Encounter on 04/18/2025   Component Date Value Ref Range Status    Glucose 04/18/2025 89  65 - 99 mg/dL Final    BUN 04/18/2025 16  6 - 20 mg/dL Final    Creatinine 04/18/2025 0.85  0.57 - 1.00 mg/dL Final    EGFR Result 04/18/2025 81.0  >60.0 mL/min/1.73 Final    Comment: GFR Categories in Chronic Kidney Disease (CKD)/X09/  /X09/  GFR Category          GFR (mL/min/1.73)    Interpretation  G1/X09/                    90 or greater/X09/        Normal or high  (1)  G2//                    60-89                Mild decrease  (1)  G3a                   45-59                Mild to moderate  decrease  G3b                   30-44                Moderate to  severe decrease  G4                    15-29                Severe decrease  G5                    14 or less           Kidney failure//  /[id number]/  (1)In the absence of evidence of kidney disease, neither  GFR category G1 or G2 fulfill the criteria for CKD.  eGFR calculation 2021 CKD-EPI creatinine equation, which  does not include race as a factor      BUN/Creatinine Ratio 04/18/2025 18.8  7.0 - 25.0 Final    Sodium 04/18/2025 141  136 - 145 mmol/L Final    Potassium 04/18/2025 4.9  3.5 - 5.2 mmol/L Final    Chloride 04/18/2025 104  98 - 107 mmol/L Final    Total CO2 04/18/2025 27.5  22.0 - 29.0 mmol/L Final    Calcium 04/18/2025 10.4  8.6 - 10.5 mg/dL Final    Total Protein 04/18/2025 7.0  6.0 - 8.5 g/dL Final    Albumin 04/18/2025 4.4  3.5 - 5.2 g/dL Final    Globulin  04/18/2025 2.6  gm/dL Final    A/G Ratio 04/18/2025 1.7  g/dL Final    Total Bilirubin 04/18/2025 0.5  0.0 - 1.2 mg/dL Final    Alkaline Phosphatase 04/18/2025 106  39 - 117 U/L Final    AST (SGOT) 04/18/2025 20  1 - 32 U/L Final    ALT (SGPT) 04/18/2025 22  1 - 33 U/L Final    Total Cholesterol 04/18/2025 225 (H)  0 - 200 mg/dL Final    Comment: Cholesterol Reference Ranges  (U.S. Department of Health and Human Services ATP III  Classifications)  Desirable          <200 mg/dL  Borderline High    200-239 mg/dL  High Risk          >240 mg/dL  Triglyceride Reference Ranges  (U.S. Department of Health and Human Services ATP III  Classifications)  Normal           <150 mg/dL  Borderline High  150-199 mg/dL  High             200-499 mg/dL  Very High        >500 mg/dL  HDL Reference Ranges  (U.S. Department of Health and Human Services ATP III  Classifications)  Low     <40 mg/dl (major risk factor for CHD)  High    >60 mg/dl ('negative' risk factor for CHD)  LDL Reference Ranges  (U.S. Department of Health and Human Services ATP III  Classifications)  Optimal          <100 mg/dL  Near Optimal     100-129 mg/dL  Borderline High  130-159 mg/dL  High             160-189 mg/dL  Very High        >189 mg/dL  LDL is calculated using the NIH LDL-C calculation.      Triglycerides 04/18/2025 97  0 - 150 mg/dL Final    HDL Cholesterol 04/18/2025 77 (H)  40 - 60 mg/dL Final    VLDL Cholesterol [place] 04/18/2025 17  5 - 40 mg/dL Final    LDL Chol Calc (NIH) 04/18/2025 131 (H)  0 - 100 mg/dL Final    Chol/HDL Ratio 04/18/2025 2.92   Final    TSH 04/18/2025 1.590  0.270 - 4.200 uIU/mL Final    Free T4 04/18/2025 1.18  0.92 - 1.68 ng/dL Final    Hemoglobin A1C 04/18/2025 5.60  4.80 - 5.60 % Final    Comment: Hemoglobin A1C Ranges:  Increased Risk for Diabetes  5.7% to 6.4%  Diabetes                     >= 6.5%  Diabetic Goal                < 7.0%      WBC 04/18/2025 6.69  3.40 - 10.80 10*3/mm3 Final    RBC 04/18/2025 4.68  3.77 - 5.28  10*6/mm3 Final    Hemoglobin 04/18/2025 14.3  12.0 - 15.9 g/dL Final    Hematocrit 04/18/2025 43.7  34.0 - 46.6 % Final    MCV 04/18/2025 93.4  79.0 - 97.0 fL Final    MCH 04/18/2025 30.6  26.6 - 33.0 pg Final    MCHC 04/18/2025 32.7  31.5 - 35.7 g/dL Final    RDW 04/18/2025 12.5  12.3 - 15.4 % Final    Platelets 04/18/2025 357  140 - 450 10*3/mm3 Final    Neutrophil Rel % 04/18/2025 54.2  42.7 - 76.0 % Final    Lymphocyte Rel % 04/18/2025 31.8  19.6 - 45.3 % Final    Monocyte Rel % 04/18/2025 9.4  5.0 - 12.0 % Final    Eosinophil Rel % 04/18/2025 3.4  0.3 - 6.2 % Final    Basophil Rel % 04/18/2025 0.9  0.0 - 1.5 % Final    Neutrophils Absolute 04/18/2025 3.62  1.70 - 7.00 10*3/mm3 Final    Lymphocytes Absolute 04/18/2025 2.13  0.70 - 3.10 10*3/mm3 Final    Monocytes Absolute 04/18/2025 0.63  0.10 - 0.90 10*3/mm3 Final    Eosinophils Absolute 04/18/2025 0.23  0.00 - 0.40 10*3/mm3 Final    Basophils Absolute 04/18/2025 0.06  0.00 - 0.20 10*3/mm3 Final    Immature Granulocyte Rel % 04/18/2025 0.3  0.0 - 0.5 % Final    Immature Grans Absolute 04/18/2025 0.02  0.00 - 0.05 10*3/mm3 Final    nRBC 04/18/2025 0.0  0.0 - 0.2 /100 WBC Final    Specific Gravity, UA 04/18/2025 1.007  1.005 - 1.030 Final    pH, UA 04/18/2025 6.0  5.0 - 7.5 Final    Color, UA 04/18/2025 Yellow  Yellow Final    Appearance, UA 04/18/2025 Clear  Clear Final    Leukocytes, UA 04/18/2025 2+ (A)  Negative Final    Protein 04/18/2025 Negative  Negative/Trace Final    Glucose, UA 04/18/2025 Negative  Negative Final    Ketones 04/18/2025 Negative  Negative Final    Blood, UA 04/18/2025 Trace (A)  Negative Final    Bilirubin, UA 04/18/2025 Negative  Negative Final    Urobilinogen, UA 04/18/2025 0.2  0.2 - 1.0 mg/dL Final    Nitrite, UA 04/18/2025 Negative  Negative Final    Microscopic Examination 04/18/2025 See below:   Final    Microscopic was indicated and was performed.    Urinalysis Reflex 04/18/2025 Comment   Final    This specimen has reflexed to a  Urine Culture.    WBC, UA 04/18/2025 0-5  0 - 5 /hpf Final    RBC, UA 04/18/2025 None seen  0 - 2 /hpf Final    Epithelial Cells (non renal) 04/18/2025 0-10  0 - 10 /hpf Final    Casts 04/18/2025 None seen  None seen /lpf Final    Bacteria, UA 04/18/2025 None seen  None seen/Few Final    Urine Culture 04/18/2025 Final report   Final    Result 1 04/18/2025 Lactobacillus species   Final    Comment: 50,000-100,000 colony forming units per mL  Susceptibility not normally performed on this organism.  **Effective May 5, 2025, UA/M w/rflx Culture, Comp ([id number]) will be**    made non-orderable. This will affect any profile containing [id number].    [facility] offers DOS test: [id number] UA/M w/rflx Culture, Routine.          No radiology results for the last 30 days.     Mammo Screening Digital Tomosynthesis Bilateral With CAD (05/15/2024 13:44)     Assessment & Plan      Ms. 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    1.  Family history of ovarian cancer  [place] lifetime risk estimate is around 10%  This puts her at average risk for breast cancer.  In regards to the family history of ovarian cancer, we discussed about her aunt undergoing genetic testing.  Her paternal aunt did undergo genetic testing and she was noted to be positive for RAD51C   This is an autosomal dominant inheritance  Given the positive family history patient underwent genetic testing and is noted to have RAD51C mutation  Explained to her that her risk of developing ovarian cancer is 9 to 11% in her lifetime.  The risk by the age of 49 is noted to be 1%.  Therefore I would recommend her to proceed with a prophylactic oophorectomy to decrease the risk of ovarian cancer.  Also discussed the risk of breast cancer associated with the mutation.  Breast cancer risk is anywhere from 15 to 40% in the  lifetime.  There is no clear NCCN recommendations on surveillance however given the elevated breast cancer risk I would recommend performing a mammogram and an MRI annually as well as every 6 monthly breast exams for surveillance.  Explained that there is an increased risk of triple negative breast cancer associated with this mutation.  5/15/2024 bilateral screening mammogram negative, BI-RADS Category 1.  Abnormal breast MRI from October 2024 requiring left breast biopsies x 2.  One of the biopsy sites return radial scar.  She will be referred to Dr. Taylor's office to discuss these findings and possible excision.  This was performed and pathology unremarkable  She will continue with high risk screening.  5/21/2025 returns for follow-up having had mammogram recently.  This was stable showing her radial scar otherwise unremarkable.  She denies any new pain today.    2.  Exercises about 3 days a week.  Continue the same.    3.genetic testing-[employer] 47 gene panel positive for RAD 51C mutation.  Referred to genetic counseling.    4.  She had salpingo-oophorectomy on 5/3/2021.  Following that she has experienced some hot flashes.  She used Estrogel for a brief period of time but now stopped.  Explained to her not to use any hormone replacement therapy given the increased risk of breast cancer associated with the mutation.    5.COVID-19 vaccination-she is vaccinated against COVID-19.    Plan:  Follow-up in 6 months with Dr. Sanz  Breast MRI due in October, scheduled  Repeat mammogram will be due May 2026

## 2025-05-21 NOTE — PROGRESS NOTES
"[facility]     Referring Provider: No ref. provider found     Chief complaint: Postoperative visit      Subjective   HPI:   12/13/2018: Saw Dr. Taylor  Ms. Juliette Manning is a 47 yo woman, seen at the request of Dr. Mikel Wilburn, for left axillary lymphadenopathy. She reports that over 10 years ago, she noticed some \"swelling\" in her left armpit. She was told at that time that she had accessory breast tissue located there and this additional tissue has been stable throughout her life. In April 2018 she noticed a painful lump in her left armpit. A left axillary ultrasound done at that time was normal. The lump and the pain eventually went away over 2-3 weeks. Recently, about 2 weeks ago, she felt like the painful lump returned. She noticed it while she was applying deodorant. She describes the pain more like a generalized discomfort. She also reports recent weight loss and says that her bras have not been fitting her very well lately. She denies any associated skin changes or nipple discharge.     She denies any prior history of abnormal mammograms or breast biopsies. She denies any family history of breast or ovarian cancer. She was joined today in clinic by her mother. She gave consent for her mother to be present during her examination and participate in the discussion.     10/20/2022, Visit with RHONDA Melendez:  She has a personal history of RAD51C mutation, kain salpingo-oophorectomy 2021  She has a family history of ovarian cancer in her paternal aunt.  Today she presents with axillary mass and increased risk for breast cancer. She denies any breast lumps, pain, skin changes, or nipple discharge. She denies any prior history of abnormal mammograms or breast biopsies.    12/19/2024: Saw Dr. Taylor  I initially saw Ms. Manning in 2018 for painful accessory left breast tissue.  There also was some concern about a left axillary lymph node at the time which was morphologically normal and stable in size.  " "Since that visit she was found to have a RAD51C mutation when genetic testing was done due to a family history of ovarian cancer.  She has since had oophorectomy and is being followed by Dr. Sanz. She has been undergoing high risk imaging screening due to the increased breast cancer risk associated with the mutation.  MRI in 10/2024 showed 2 new abnormal areas in the left breast.  These were biopsied and one of the sites showed a radial scar.    1/22/2025: Saw Dr. Taylor  She underwent left breast excisional biopsy on 1/7/25. See surgery & pathology details below in oncologic history. She has been recovering well and has no complaints.     1/30/2025: Saw Dr. Taylor  Last week the incision looked great.  Over the weekend she picked off the Dermabond because it was coming up in a couple of places and applied a \"tallow salve\" which she uses on other places on her body.  Earlier this week she began develop a rash and the breast looked more swollen.  This has progressively gotten worse.    2/3/2025 saw Dr. Taylor  At her last visit I prescribed Keflex and triamcinolone ointment for allergic dermatitis.  She called me 2 days later and it had not gotten better, so I prescribed a Medrol Dosepak.  It has been getting better over the past few days.    5/22/2025 interval history  Patient presenting to the office today for routine follow-up.  On 5/16/2025 she had a bilateral screening mammogram that resulted as BI-RADS 2.  She has no new breast issues.  The previous issue that she was having completely resolved with antibiotic and Medrol Dosepak.       Breast history/imaging (updated 2/3/2025):     [facility]17[facility]18[facility] Scattered fibroglandular densities. No discrete masses or stellate lesions or suspicious calcifications.  BIRADS: 1 Negative    5/17/18, Left Breast US ( [facility]): In the left axilla there is an echogenic nodule measuring approximately 6 mm in greatest dimension. This is probably a small echogenic " lymph node. No sonographic evidence of malignancy. No suspicious masses or other abnormalities are seen.  (This measurement actually refers to the hilum)     12/12/18, Bilateral diagnostic MMG with [facility] & Left US ( [facility]):   MMG - Scattered fibroglandular density. There are no findings to suggest malignancy.  US - The area of concern corresponds to the left axilla where there are several small lymph nodes. Ultrasound also confirms the presence of a lymph node in the area of concern measuring 1.3 x 2 cm and having normal morphology. It appears similar to the previous ultrasound exam dated 05/17/2018. No other abnormality is seen.  IMPRESSION - The lymph node has normal morphology and appears unchanged from 05/17/2018.   BIRADS: 2 Benign     12[facility]13/2019[facility]  There are scattered fibroglandular densities. No dominant mass, areas of architectural distortion or skin thickening. There is no evidence for axillary lymphadenopathy, nipple retraction nor suspicious  calcifications. Tomosynthesis utilized.  BI-RADS 1: Negative     4/16/2021, Bilateral Screening MMG with [facility] ( [facility]):  There are scattered areas of fibroglandular density.   There are no suspicious masses, calcifications, or areas of architectural distortion.  BI-RADS 1: Negative     9/28/2021, Bilateral Breast MRI ( [facility]):  Scattered fibroglandular tissue is seen throughout both breasts. Mild background parenchymal enhancement of both breasts is noted. A moderate degree of scattered stippled foci of variable enhancement is seen throughout both breasts, none of which appear dominant or clumped. I see no areas of abnormal enhancement or morphology in either breast. There is no evidence for abnormal skin,  nipple or chest wall enhancement of either breast and there is no evidence for axillary or internal mammary chain adenopathy.   BI-RADS  2: Benign findings     5/5/2022, Screening MMG with [facility] ( [facility]):  There are scattered areas  of fibroglandular density.   There are no suspicious masses, calcifications, or areas of architectural distortion.  BI-RADS 1: Negative     10/3/2022, Bilateral Breast MRI ( [facility]):  Scattered fibroglandular tissue is seen throughout both breasts. Mild background parenchymal enhancement of both breasts is noted. I see no areas of suspicious enhancement or morphology in either  breast. There is no evidence for abnormal skin, nipple or chest wall enhancement of either breast and I see no evidence for axillary or internal mammary chain adenopathy.  BI-RADS 1: Negative.     5/12/2023, Screening MMG with [facility] ( [facility]):  Scattered fibroglandular densities are seen throughout both breasts in a pattern which is unchanged. I see no new or dominant masses, areas of architectural distortion or skin thickening. There is no evidence for axillary lymphadenopathy or nipple retraction.  BI-RADS 1: Negative.     10/4/2023, Bilateral Breast MRI ( [facility]):  RIGHT BREAST:    No suspicious enhancing mass or area of non-mass enhancement is identified. The visualized axilla is within normal limits.  LEFT BREAST:    No suspicious enhancing mass or area of non-mass enhancement is identified.  The visualized axilla is within normal limits.   EXTRAMAMMARY FINDINGS:  There are no pathologically enlarged internal mammary chain lymph nodes on either side.     BI-RADS 1: Negative     5/15/2024, Bilateral Screening MMG with [facility] ( [facility]):  There are scattered areas of fibroglandular density.   There are no suspicious masses, calcifications, or areas of architectural distortion.   BI-RADS  1: Negative     10/24/2024, Bilateral Breast MRI ( [facility]):  RIGHT BREAST:    No suspicious enhancing mass or area of non-mass enhancement is identified.  The visualized axilla is within normal limits.  LEFT BREAST:    At 12:00 in the middle left breast, 6 cm posterior to the nipple, there is 2 x 0.9 x 1 cm non-mass enhancement, which is suspicious.  At 12:00 in  the middle to posterior left breast, 6.5 cm posterior to the  nipple, there is a 0.9 x 0.5 x 1 cm non-mass enhancement, which is suspicious.  The visualized axilla is within normal limits.   EXTRAMAMMARY FINDINGS:  There are no pathologically enlarged internal mammary chain lymph nodes on either side.     BI-RADS 4: Suspicious      11/11/2024, Left Breast, MRI Biopsy x 2 ([facility]):  1.  Breast, left middle 12 o'clock position site A, MRI guided core biopsy: (Stoplight clip)               A.  Clustered apocrine cysts with focal associated calcium oxalate crystals (microcalcifications).  2.  Breast, left posterior 12 o'clock position site B, MRI-guided core biopsy: (Infinity clip)  A.  Radial scar, measuring 2.5 mm and involving 1 core with adjacent clustered microcysts with columnar cell change, apocrine metaplasia and mild ductal hyperplasia of the usual type.  -Post-procedural digital orthogonal mammographic views of the left breast demonstrate the stoplight clip at the expected location at the site of biopsy in the upper central middle left breast. The Infinity clip is laterally displaced by approximately 1 cm.    1/7/2025, Left breast needle-localized excisional biopsy:  1.  Left breast, excisional biopsy:         A.  Residual radial scar with usual ductal hyperplasia, apocrine cysts, micropapillomas, and clustered microcysts   with columnar cell change.         B.  Negative for atypical hyperplasia or carcinoma.         C.  Clip and biopsy site changes present.    5/16/2025 bilateral screening mammogram at [facility]  BREAST  DENSITY: The breasts are heterogeneously dense, which may  obscure small masses.  There are no suspicious masses, calcifications, or areas of  architectural distortion. There is slight postsurgical scarring in the  upper outer left breast when compared to the study of 5/12/2023. There  are no findings in either breast to suggest malignancy.  IMPRESSION/RECOMMENDATION(S):  Benign mammogram with  minor postsurgical scarring in posterior third of  upper outer left breast. There is otherwise no change from 5/12/2023.  Recommend annual screening mammogram in one year.  BI-RADS Category 2    Review of Systems:  See interval history.       Medications:    Current Outpatient Medications:     acyclovir (ZOVIRAX) 400 MG tablet, TAKE 2 TABLETS THREE TIMES A DAY UNTIL GONE, Disp: 30 tablet, Rfl: 2    albuterol sulfate  (90 Base) MCG/ACT inhaler, Inhale 2 puffs Every 4 (Four) Hours As Needed for Wheezing., Disp: 6.7 g, Rfl: 3    azelastine (ASTELIN) 0.1 % nasal spray, 2 SPRAY IN EACH NOSTRIL DAILY AS DIRECTED, Disp: 30 mL, Rfl: 3    calcium polycarbophil (FiberCon) 625 MG tablet, , Disp: , Rfl:     Clindamycin Phos-Benzoyl Perox 1.2-5 % gel, Apply 1 application  topically to the appropriate area as directed As Needed., Disp: , Rfl: 5    escitalopram (LEXAPRO) 10 MG tablet, Take 1.5 tablets by mouth Daily., Disp: 90 tablet, Rfl: 3    Flaxseed, Linseed, (FLAX SEED OIL PO), Take 1 tablet by mouth Daily. PT TO HOLD FOR SURGERY, Disp: , Rfl:     Loratadine (CLARITIN PO), Take 10 mg by mouth Daily., Disp: , Rfl:     LYSINE PO, Take 1,000 mg by mouth Daily. HOLD PRIOR TO SURG, Disp: , Rfl:     multivitamin with minerals tablet tablet, Take 1 tablet by mouth Daily. HOLD PRIOR TO SURG, Disp: , Rfl:     tretinoin (RETIN-A) 0.05 % cream, Apply 1 Application topically to the appropriate area as directed Every Night., Disp: , Rfl: 5    Triamcinolone Acetonide (NASACORT) 55 MCG/ACT nasal inhaler, Administer 2 sprays into the nostril(s) as directed by provider Daily., Disp: , Rfl:     Turmeric 500 MG capsule, Take 1 capsule by mouth Daily. HOLD PRIOR TO SURG, Disp: , Rfl:       Allergies   Allergen Reactions    Ampicillin Hives       Family History   Problem Relation Age of Onset    Atrial fibrillation Mother     GARFIELD disease Mother     Irritable bowel syndrome Mother     Melanoma Mother     Skin cancer Mother         melanoma     Diabetes Father     Kidney disease Father     Seizures Sister     ADD / ADHD Sister     ADD / ADHD Son     Ovarian cancer Paternal Aunt     Cancer Paternal Aunt         Ovarian Cancer    Cancer Paternal Uncle         Bladder cancer    Diabetes Cousin     Malig Hyperthermia Neg Hx     Breast cancer Neg Hx        Objective   PHYSICAL EXAMINATION:   There were no vitals filed for this visit.    ECOG 0 - Asymptomatic  General: NAD, well appearing  Psych: a&o x 3, normal mood and affect  Eyes: EOMI, no scleral icterus  ENMT: neck supple without masses or thyromegaly, mucus membranes moist  MSK: normal gait, normal ROM in bilateral shoulders  Lymph nodes: Increased soft tissue in the left axilla versus the right; no cervical, supraclavicular or axillary lymphadenopathy  Breast: moderate size, grade 1 ptosis, left slightly larger than right  Right: No visible abnormalities on inspection while seated, with arms raised or hands on hips.  No masses, skin changes or nipple abnormalities  Left: Well-healed lateral periareolar incision.  No masses, skin changes or nipple abnormality      Assessment & Plan   Assessment:   1. 55 y.o. F sp left breast excisional biopsy of a radial scar on 1/7/2025 with benign final pathology.  2.  She has an increased lifetime risk of breast cancer associated with a RAD51C pathogenic mutation.       Plan:  -breast MRI and exam in Oct    Maury Carter, APRN

## 2025-05-22 ENCOUNTER — OFFICE VISIT (OUTPATIENT)
Dept: SURGERY | Facility: CLINIC | Age: 55
End: 2025-05-22
Payer: COMMERCIAL

## 2025-05-22 VITALS
HEART RATE: 85 BPM | SYSTOLIC BLOOD PRESSURE: 118 MMHG | DIASTOLIC BLOOD PRESSURE: 78 MMHG | OXYGEN SATURATION: 98 % | BODY MASS INDEX: 30.65 KG/M2 | HEIGHT: 63 IN | WEIGHT: 173 LBS

## 2025-05-22 DIAGNOSIS — Z91.89 AT HIGH RISK FOR BREAST CANCER: Primary | ICD-10-CM

## 2025-06-29 DIAGNOSIS — R09.81 SINUS CONGESTION: ICD-10-CM

## 2025-06-29 RX ORDER — AZELASTINE 1 MG/ML
SPRAY, METERED NASAL
Qty: 30 ML | Refills: 3 | Status: SHIPPED | OUTPATIENT
Start: 2025-06-29

## (undated) DEVICE — CANN NASL CO2 TRULINK W/O2 A/

## (undated) DEVICE — ENDOPATH XCEL BLUNT TIP TROCARS WITH SMOOTH SLEEVES: Brand: ENDOPATH XCEL

## (undated) DEVICE — GLV SURG BIOGEL LTX PF 7 1/2

## (undated) DEVICE — ENDOPATH XCEL BLADELESS TROCARS WITH STABILITY SLEEVES: Brand: ENDOPATH XCEL

## (undated) DEVICE — UNDYED BRAIDED (POLYGLACTIN 910), SYNTHETIC ABSORBABLE SUTURE: Brand: COATED VICRYL

## (undated) DEVICE — NDL LOCALIZER HOMER MAALOK ULTR 20G 3CM

## (undated) DEVICE — HYPODERMIC SAFETY NEEDLE: Brand: MONOJECT

## (undated) DEVICE — HARMONIC ACE +7 LAPAROSCOPIC SHEARS ADVANCED HEMOSTASIS 5MM DIAMETER 36CM SHAFT LENGTH  FOR USE WITH GRAY HAND PIECE ONLY: Brand: HARMONIC ACE

## (undated) DEVICE — LEGGINGS, PAIR, 31X48, STERILE: Brand: MEDLINE

## (undated) DEVICE — SUT SILK 2/0 SH 30IN K833H

## (undated) DEVICE — KT VLV BIOGUARD SXN BIOP AIR/H20 CONN 4PC DISP

## (undated) DEVICE — TUBING, SUCTION, 1/4" X 10', STRAIGHT: Brand: MEDLINE

## (undated) DEVICE — STPLR SKIN VISISTAT WD 35CT

## (undated) DEVICE — THE TORRENT IRRIGATION SCOPE CONNECTOR IS USED WITH THE TORRENT IRRIGATION TUBING TO PROVIDE IRRIGATION FLUIDS SUCH AS STERILE WATER DURING GASTROINTESTINAL ENDOSCOPIC PROCEDURES WHEN USED IN CONJUNCTION WITH AN IRRIGATION PUMP (OR ELECTROSURGICAL UNIT).: Brand: TORRENT

## (undated) DEVICE — ANTIBACTERIAL UNDYED BRAIDED (POLYGLACTIN 910), SYNTHETIC ABSORBABLE SUTURE: Brand: COATED VICRYL

## (undated) DEVICE — GLV SURG SENSICARE POLYISPRN W/ALOE PF LF 6.5 GRN STRL

## (undated) DEVICE — ADHS SKIN SURG TISS VISC PREMIERPRO EXOFIN HI/VISC 1ML

## (undated) DEVICE — SUT MNCRYL PLS ANTIB UD 4/0 PS2 18IN

## (undated) DEVICE — ELECTRD BLD EZ CLN MOD XLNG 2.75IN

## (undated) DEVICE — SKIN PREP TRAY W/CHG: Brand: MEDLINE INDUSTRIES, INC.

## (undated) DEVICE — GOWN,SIRUS,NON REINFRCD,LARGE,SET IN SL: Brand: MEDLINE

## (undated) DEVICE — ENDOPATH PNEUMONEEDLE INSUFFLATION NEEDLES WITH LUER LOCK CONNECTORS 120MM: Brand: ENDOPATH

## (undated) DEVICE — PK CHST BRST 40

## (undated) DEVICE — APPL CHLORAPREP HI/LITE 26ML ORNG

## (undated) DEVICE — SENSR O2 OXIMAX FNGR A/ 18IN NONSTR

## (undated) DEVICE — PK LAP GYN TOWER 40

## (undated) DEVICE — GLV SURG SIGNATURE ESSENTIAL PF LTX SZ8

## (undated) DEVICE — SYR LL TP 10ML STRL

## (undated) DEVICE — GLV SURG SENSICARE PI MIC PF SZ6.5 LF STRL